# Patient Record
Sex: MALE | Race: WHITE | NOT HISPANIC OR LATINO | URBAN - METROPOLITAN AREA
[De-identification: names, ages, dates, MRNs, and addresses within clinical notes are randomized per-mention and may not be internally consistent; named-entity substitution may affect disease eponyms.]

---

## 2024-03-25 ENCOUNTER — INPATIENT (INPATIENT)
Facility: HOSPITAL | Age: 79
LOS: 6 days | Discharge: NURSING, REHAB OR LONG-TERM CARE | DRG: 184 | End: 2024-04-01
Attending: SURGERY | Admitting: SURGERY
Payer: OTHER GOVERNMENT

## 2024-03-25 VITALS
TEMPERATURE: 98 F | HEART RATE: 62 BPM | DIASTOLIC BLOOD PRESSURE: 83 MMHG | HEIGHT: 68 IN | SYSTOLIC BLOOD PRESSURE: 136 MMHG | WEIGHT: 192.9 LBS | RESPIRATION RATE: 18 BRPM | OXYGEN SATURATION: 95 %

## 2024-03-25 DIAGNOSIS — Z95.5 PRESENCE OF CORONARY ANGIOPLASTY IMPLANT AND GRAFT: Chronic | ICD-10-CM

## 2024-03-25 DIAGNOSIS — S22.42XA MULTIPLE FRACTURES OF RIBS, LEFT SIDE, INITIAL ENCOUNTER FOR CLOSED FRACTURE: ICD-10-CM

## 2024-03-25 DIAGNOSIS — Z95.1 PRESENCE OF AORTOCORONARY BYPASS GRAFT: Chronic | ICD-10-CM

## 2024-03-25 LAB
ALBUMIN SERPL ELPH-MCNC: 3.6 G/DL — SIGNIFICANT CHANGE UP (ref 3.3–5.2)
ALP SERPL-CCNC: 55 U/L — SIGNIFICANT CHANGE UP (ref 40–120)
ALT FLD-CCNC: 9 U/L — SIGNIFICANT CHANGE UP
ANION GAP SERPL CALC-SCNC: 13 MMOL/L — SIGNIFICANT CHANGE UP (ref 5–17)
APTT BLD: 33.5 SEC — SIGNIFICANT CHANGE UP (ref 24.5–35.6)
AST SERPL-CCNC: 15 U/L — SIGNIFICANT CHANGE UP
BASOPHILS # BLD AUTO: 0 K/UL — SIGNIFICANT CHANGE UP (ref 0–0.2)
BASOPHILS NFR BLD AUTO: 0 % — SIGNIFICANT CHANGE UP (ref 0–2)
BILIRUB SERPL-MCNC: 0.4 MG/DL — SIGNIFICANT CHANGE UP (ref 0.4–2)
BLD GP AB SCN SERPL QL: SIGNIFICANT CHANGE UP
BUN SERPL-MCNC: 15.9 MG/DL — SIGNIFICANT CHANGE UP (ref 8–20)
CALCIUM SERPL-MCNC: 8.8 MG/DL — SIGNIFICANT CHANGE UP (ref 8.4–10.5)
CHLORIDE SERPL-SCNC: 96 MMOL/L — SIGNIFICANT CHANGE UP (ref 96–108)
CO2 SERPL-SCNC: 20 MMOL/L — LOW (ref 22–29)
CREAT SERPL-MCNC: 1.13 MG/DL — SIGNIFICANT CHANGE UP (ref 0.5–1.3)
EGFR: 67 ML/MIN/1.73M2 — SIGNIFICANT CHANGE UP
EOSINOPHIL # BLD AUTO: 0.14 K/UL — SIGNIFICANT CHANGE UP (ref 0–0.5)
EOSINOPHIL NFR BLD AUTO: 1.8 % — SIGNIFICANT CHANGE UP (ref 0–6)
GIANT PLATELETS BLD QL SMEAR: PRESENT — SIGNIFICANT CHANGE UP
GLUCOSE SERPL-MCNC: 152 MG/DL — HIGH (ref 70–99)
HCT VFR BLD CALC: 30.7 % — LOW (ref 39–50)
HGB BLD-MCNC: 10.2 G/DL — LOW (ref 13–17)
INR BLD: 1.62 RATIO — HIGH (ref 0.85–1.18)
LYMPHOCYTES # BLD AUTO: 1.58 K/UL — SIGNIFICANT CHANGE UP (ref 1–3.3)
LYMPHOCYTES # BLD AUTO: 20.3 % — SIGNIFICANT CHANGE UP (ref 13–44)
MAGNESIUM SERPL-MCNC: 1.7 MG/DL — SIGNIFICANT CHANGE UP (ref 1.6–2.6)
MANUAL SMEAR VERIFICATION: SIGNIFICANT CHANGE UP
MCHC RBC-ENTMCNC: 28.3 PG — SIGNIFICANT CHANGE UP (ref 27–34)
MCHC RBC-ENTMCNC: 33.2 GM/DL — SIGNIFICANT CHANGE UP (ref 32–36)
MCV RBC AUTO: 85.3 FL — SIGNIFICANT CHANGE UP (ref 80–100)
MONOCYTES # BLD AUTO: 0.48 K/UL — SIGNIFICANT CHANGE UP (ref 0–0.9)
MONOCYTES NFR BLD AUTO: 6.2 % — SIGNIFICANT CHANGE UP (ref 2–14)
MYELOCYTES NFR BLD: 0.9 % — HIGH (ref 0–0)
NEUTROPHILS # BLD AUTO: 5.44 K/UL — SIGNIFICANT CHANGE UP (ref 1.8–7.4)
NEUTROPHILS NFR BLD AUTO: 69.9 % — SIGNIFICANT CHANGE UP (ref 43–77)
PHOSPHATE SERPL-MCNC: 2.4 MG/DL — SIGNIFICANT CHANGE UP (ref 2.4–4.7)
PLAT MORPH BLD: NORMAL — SIGNIFICANT CHANGE UP
PLATELET # BLD AUTO: 261 K/UL — SIGNIFICANT CHANGE UP (ref 150–400)
POTASSIUM SERPL-MCNC: 3.8 MMOL/L — SIGNIFICANT CHANGE UP (ref 3.5–5.3)
POTASSIUM SERPL-SCNC: 3.8 MMOL/L — SIGNIFICANT CHANGE UP (ref 3.5–5.3)
PROT SERPL-MCNC: 6.3 G/DL — LOW (ref 6.6–8.7)
PROTHROM AB SERPL-ACNC: 17.7 SEC — HIGH (ref 9.5–13)
RBC # BLD: 3.6 M/UL — LOW (ref 4.2–5.8)
RBC # FLD: 14.9 % — HIGH (ref 10.3–14.5)
RBC BLD AUTO: NORMAL — SIGNIFICANT CHANGE UP
SMUDGE CELLS # BLD: PRESENT — SIGNIFICANT CHANGE UP
SODIUM SERPL-SCNC: 129 MMOL/L — LOW (ref 135–145)
VARIANT LYMPHS # BLD: 0.9 % — SIGNIFICANT CHANGE UP (ref 0–6)
WBC # BLD: 7.78 K/UL — SIGNIFICANT CHANGE UP (ref 3.8–10.5)
WBC # FLD AUTO: 7.78 K/UL — SIGNIFICANT CHANGE UP (ref 3.8–10.5)

## 2024-03-25 RX ORDER — FENTANYL CITRATE 50 UG/ML
100 INJECTION INTRAVENOUS ONCE
Refills: 0 | Status: DISCONTINUED | OUTPATIENT
Start: 2024-03-25 | End: 2024-03-25

## 2024-03-25 RX ORDER — FINASTERIDE 5 MG/1
5 TABLET, FILM COATED ORAL DAILY
Refills: 0 | Status: DISCONTINUED | OUTPATIENT
Start: 2024-03-25 | End: 2024-04-01

## 2024-03-25 RX ORDER — METHOCARBAMOL 500 MG/1
1000 TABLET, FILM COATED ORAL EVERY 8 HOURS
Refills: 0 | Status: COMPLETED | OUTPATIENT
Start: 2024-03-25 | End: 2024-03-26

## 2024-03-25 RX ORDER — METOPROLOL TARTRATE 50 MG
25 TABLET ORAL
Refills: 0 | Status: DISCONTINUED | OUTPATIENT
Start: 2024-03-25 | End: 2024-04-01

## 2024-03-25 RX ORDER — ATORVASTATIN CALCIUM 80 MG/1
20 TABLET, FILM COATED ORAL AT BEDTIME
Refills: 0 | Status: DISCONTINUED | OUTPATIENT
Start: 2024-03-25 | End: 2024-04-01

## 2024-03-25 RX ORDER — SUCRALFATE 1 G
1 TABLET ORAL
Refills: 0 | DISCHARGE

## 2024-03-25 RX ORDER — ATORVASTATIN CALCIUM 80 MG/1
1 TABLET, FILM COATED ORAL
Refills: 0 | DISCHARGE

## 2024-03-25 RX ORDER — DULOXETINE HYDROCHLORIDE 30 MG/1
30 CAPSULE, DELAYED RELEASE ORAL DAILY
Refills: 0 | Status: DISCONTINUED | OUTPATIENT
Start: 2024-03-25 | End: 2024-04-01

## 2024-03-25 RX ORDER — SODIUM CHLORIDE 9 MG/ML
500 INJECTION INTRAMUSCULAR; INTRAVENOUS; SUBCUTANEOUS ONCE
Refills: 0 | Status: COMPLETED | OUTPATIENT
Start: 2024-03-25 | End: 2024-03-25

## 2024-03-25 RX ORDER — ACETAMINOPHEN 500 MG
1000 TABLET ORAL EVERY 6 HOURS
Refills: 0 | Status: COMPLETED | OUTPATIENT
Start: 2024-03-25 | End: 2024-03-26

## 2024-03-25 RX ORDER — GABAPENTIN 400 MG/1
1 CAPSULE ORAL
Refills: 0 | DISCHARGE

## 2024-03-25 RX ORDER — SUCRALFATE 1 G
1 TABLET ORAL
Refills: 0 | Status: DISCONTINUED | OUTPATIENT
Start: 2024-03-25 | End: 2024-04-01

## 2024-03-25 RX ORDER — LEVOTHYROXINE SODIUM 125 MCG
1 TABLET ORAL
Refills: 0 | DISCHARGE

## 2024-03-25 RX ORDER — FINASTERIDE 5 MG/1
1 TABLET, FILM COATED ORAL
Refills: 0 | DISCHARGE

## 2024-03-25 RX ORDER — METOPROLOL TARTRATE 50 MG
1 TABLET ORAL
Refills: 0 | DISCHARGE

## 2024-03-25 RX ORDER — NICOTINE POLACRILEX 2 MG
1 GUM BUCCAL DAILY
Refills: 0 | Status: DISCONTINUED | OUTPATIENT
Start: 2024-03-25 | End: 2024-04-01

## 2024-03-25 RX ORDER — GABAPENTIN 400 MG/1
100 CAPSULE ORAL THREE TIMES A DAY
Refills: 0 | Status: DISCONTINUED | OUTPATIENT
Start: 2024-03-25 | End: 2024-04-01

## 2024-03-25 RX ORDER — TAMSULOSIN HYDROCHLORIDE 0.4 MG/1
0.4 CAPSULE ORAL AT BEDTIME
Refills: 0 | Status: DISCONTINUED | OUTPATIENT
Start: 2024-03-25 | End: 2024-04-01

## 2024-03-25 RX ORDER — CHLORHEXIDINE GLUCONATE 213 G/1000ML
1 SOLUTION TOPICAL DAILY
Refills: 0 | Status: DISCONTINUED | OUTPATIENT
Start: 2024-03-25 | End: 2024-03-28

## 2024-03-25 RX ORDER — LEVOTHYROXINE SODIUM 125 MCG
25 TABLET ORAL DAILY
Refills: 0 | Status: DISCONTINUED | OUTPATIENT
Start: 2024-03-25 | End: 2024-04-01

## 2024-03-25 RX ORDER — LIDOCAINE 4 G/100G
3 CREAM TOPICAL EVERY 24 HOURS
Refills: 0 | Status: DISCONTINUED | OUTPATIENT
Start: 2024-03-25 | End: 2024-04-01

## 2024-03-25 RX ORDER — TAMSULOSIN HYDROCHLORIDE 0.4 MG/1
1 CAPSULE ORAL
Refills: 0 | DISCHARGE

## 2024-03-25 RX ORDER — DULOXETINE HYDROCHLORIDE 30 MG/1
1 CAPSULE, DELAYED RELEASE ORAL
Refills: 0 | DISCHARGE

## 2024-03-25 RX ORDER — SODIUM CHLORIDE 9 MG/ML
1000 INJECTION INTRAMUSCULAR; INTRAVENOUS; SUBCUTANEOUS
Refills: 0 | Status: DISCONTINUED | OUTPATIENT
Start: 2024-03-25 | End: 2024-03-26

## 2024-03-25 RX ORDER — INFLUENZA VIRUS VACCINE 15; 15; 15; 15 UG/.5ML; UG/.5ML; UG/.5ML; UG/.5ML
0.7 SUSPENSION INTRAMUSCULAR ONCE
Refills: 0 | Status: DISCONTINUED | OUTPATIENT
Start: 2024-03-25 | End: 2024-04-01

## 2024-03-25 RX ORDER — POLYETHYLENE GLYCOL 3350 17 G/17G
17 POWDER, FOR SOLUTION ORAL DAILY
Refills: 0 | Status: DISCONTINUED | OUTPATIENT
Start: 2024-03-25 | End: 2024-04-01

## 2024-03-25 RX ORDER — METOPROLOL TARTRATE 50 MG
25 TABLET ORAL
Refills: 0 | Status: DISCONTINUED | OUTPATIENT
Start: 2024-03-25 | End: 2024-03-25

## 2024-03-25 RX ORDER — OXYCODONE HYDROCHLORIDE 5 MG/1
5 TABLET ORAL EVERY 4 HOURS
Refills: 0 | Status: DISCONTINUED | OUTPATIENT
Start: 2024-03-25 | End: 2024-03-26

## 2024-03-25 RX ORDER — OXYCODONE HYDROCHLORIDE 5 MG/1
10 TABLET ORAL EVERY 4 HOURS
Refills: 0 | Status: DISCONTINUED | OUTPATIENT
Start: 2024-03-25 | End: 2024-03-26

## 2024-03-25 RX ORDER — HEPARIN SODIUM 5000 [USP'U]/ML
5000 INJECTION INTRAVENOUS; SUBCUTANEOUS EVERY 8 HOURS
Refills: 0 | Status: DISCONTINUED | OUTPATIENT
Start: 2024-03-25 | End: 2024-03-27

## 2024-03-25 RX ADMIN — Medication 400 MILLIGRAM(S): at 20:40

## 2024-03-25 RX ADMIN — FENTANYL CITRATE 100 MICROGRAM(S): 50 INJECTION INTRAVENOUS at 17:00

## 2024-03-25 RX ADMIN — FENTANYL CITRATE 100 MICROGRAM(S): 50 INJECTION INTRAVENOUS at 16:30

## 2024-03-25 RX ADMIN — GABAPENTIN 100 MILLIGRAM(S): 400 CAPSULE ORAL at 22:00

## 2024-03-25 RX ADMIN — METHOCARBAMOL 220 MILLIGRAM(S): 500 TABLET, FILM COATED ORAL at 20:14

## 2024-03-25 RX ADMIN — LIDOCAINE 3 PATCH: 4 CREAM TOPICAL at 20:39

## 2024-03-25 RX ADMIN — TAMSULOSIN HYDROCHLORIDE 0.4 MILLIGRAM(S): 0.4 CAPSULE ORAL at 22:00

## 2024-03-25 RX ADMIN — ATORVASTATIN CALCIUM 20 MILLIGRAM(S): 80 TABLET, FILM COATED ORAL at 22:01

## 2024-03-25 RX ADMIN — Medication 1000 MILLIGRAM(S): at 21:53

## 2024-03-25 NOTE — H&P ADULT - ATTENDING COMMENTS
Patient seen and examined in ED. C/o pain with inspiration, otherwise GCS 15. discussed GOC with pt and his daughters, pt is a DNR/DNI. Will admit to ICU for pain control ,pulm toilet.

## 2024-03-25 NOTE — H&P ADULT - NSICDXPASTMEDICALHX_GEN_ALL_CORE_FT
PAST MEDICAL HISTORY:  Alcohol use disorder, mild, abuse     Benign essential HTN     BPH (benign prostatic hyperplasia)     Hypothyroidism     Smokes tobacco daily

## 2024-03-25 NOTE — ED PROVIDER NOTE - ATTENDING CONTRIBUTION TO CARE
Patient seen with resident.  TRENT.  Txfr from  due to trauma with multiple rib fractures.  CT Head/cspine from  without acute pathology.  CT chest with fx of left ribs 7-10 and subacute/displaced 11.  Patient does state he recently broke a rib.  No other concnerns.  Daughter bedside.  Pending surgical consultation..  Non toxic.  Well appearing. No fever/chills, , No central chest pain/palpitations, no SOB/cough/wheeze/stridor, No abdominal pain, No N/V,  No neck/back pain, no rash, no changes in neurological status/function.     Gen: Alert, NAD  Head: NC, AT, PERRL, EOMI, normal lids/conjunctiva  ENT: normal hearing, patent oropharynx   Neck: +supple, no tenderness/meningismus/JVD, +Trachea midline  Pulm: Bilateral BS, normal resp effort, no wheeze/stridor/retractions  CV: RRR, no R/G, +dist pulses  Abd: soft, NT/ND, +BS, no hepatosplenomegaly  Mskel: TTP left lower thorax ribs 6-12, no edema/erythema/cyanosis  Skin: no rash  Neuro: AAOx3, no gross sensory/motor deficits

## 2024-03-25 NOTE — PATIENT PROFILE ADULT - FALL HARM RISK - RISK INTERVENTIONS

## 2024-03-25 NOTE — H&P ADULT - ASSESSMENT
78M s/p fall with L sided 7-11 rib fx.     - Admit to Trauma under Dr. Barton   - Will go in to SICU  - PIC protocol  - Restart home meds  - Patient seen and examined with attending

## 2024-03-25 NOTE — ED ADULT NURSE NOTE - NSFALLHARMRISKINTERV_ED_ALL_ED
Assistance OOB with selected safe patient handling equipment if applicable/Communicate risk of Fall with Harm to all staff, patient, and family/Provide visual cue: red socks, yellow wristband, yellow gown, etc/Reinforce activity limits and safety measures with patient and family/Bed in lowest position, wheels locked, appropriate side rails in place/Call bell, personal items and telephone in reach/Instruct patient to call for assistance before getting out of bed/chair/stretcher/Non-slip footwear applied when patient is off stretcher/[place] to call system/Physically safe environment - no spills, clutter or unnecessary equipment/Purposeful Proactive Rounding/Room/bathroom lighting operational, light cord in reach

## 2024-03-25 NOTE — ED PROVIDER NOTE - PHYSICAL EXAMINATION
General: Well appearing in no acute distress, alert and cooperative  Head: Normocephalic, atraumatic  Eyes: PERRLA, no conjunctival injection, no scleral icterus, EOMI  ENMT: Atraumatic external nose and ears, moist mucous membranes, oropharynx clear  Neck: Soft and supple, full ROM without pain  Cardiac: Regular rate and regular rhythm, no murmurs, peripheral pulses 2+ and symmetric in all extremities, no LE edema.  Resp: Unlabored respiratory effort, lungs CTAB, speaking in full sentences, no wheezes  Abd: Soft, non-tender, non-distended  MSK:   Left thoracic tenderness  Skin: Warm and dry  Neuro: AO x 3, moves all extremities symmetrically, Motor strength and sensation grossly intact

## 2024-03-25 NOTE — ED ADULT NURSE REASSESSMENT NOTE - NS ED NURSE REASSESS COMMENT FT1
pic score 6
to SICU
plan of care assumed from SHASHI RN @1920. c/o pain to left side of thoric cage with movement. s/p fall out of bed with + rib fractures. pt denies CP/pressure/tightness, palpitations, SOB/dyspnea, dizziness/headache/lightheadedness/blurry vision, tingling/numbness, fevers/chills, N/V/D, urinary S&S upon RN assessment. pt admitted to SICU. awaiting bed. plan of care reviewed with pt and family @ bedside. pt in understanding of plan of care.

## 2024-03-25 NOTE — H&P ADULT - NSHPLABSRESULTS_GEN_ALL_CORE
IMAGING:  CT Chest   FINDINGS:    LUNGS AND AIRWAYS: Patent central airways. Low lung volumes with bibasilar dependent atelectatic changes. Right perihilar right middle lobe blebs. No focal consolidation..  PLEURA: No pleural effusion or pneumothorax.  MEDIASTINUM AND MINDY: No lymphadenopathy.  VESSELS: Ectatic ascending thoracic aorta up to 4.4 cm similar to prior  HEART: Heart size is normal. Coronary artery calcifications. No pericardial effusion.  CHEST WALL AND LOWER NECK: Median sternotomy.  VISUALIZED UPPER ABDOMEN: Epigastric ventral hernia containing a knuckle of nonobstructive large bowel. Aortoiliac stent graft. Bilateral nonspecific perinephric stranding..  BONES: Acute minimally displaced fractures of the left lateral seventh, eighth, ninth, and 10th ribs. subacute displaced fracture left 11th posterolateral rib. Degenerative changes.    IMPRESSION:  Acute displaced fractures of the left seventh through 10th ribs.  Subacute displaced fracture left 11th rib.  No pneumothorax or hemothorax. No other acute finding on this noncontrast study.    CT Head:  IMPRESSION:    CT head: No acute intracranial hemorrhage or mass effect.    CT cervical spine: No evidence for acute displaced fracture or malalignment.

## 2024-03-25 NOTE — H&P ADULT - NSHPPHYSICALEXAM_GEN_ALL_CORE
Vital Signs Last 24 Hrs  T(C): 36.6 (03-25-24 @ 18:20), Max: 36.7 (03-25-24 @ 15:42)  T(F): 97.9 (03-25-24 @ 18:20), Max: 98 (03-25-24 @ 15:42)  HR: 68 (03-25-24 @ 19:34) (62 - 68)  BP: 91/68 (03-25-24 @ 19:34) (91/68 - 149/80)  BP(mean): 74 (03-25-24 @ 19:34) (74 - 74)  RR: 20 (03-25-24 @ 19:34) (16 - 20)  SpO2: 100% (03-25-24 @ 19:34) (95% - 100%)    Constitutional: Well-developed well nourished Male in no acute distress  HEENT: Head is normocephalic and atraumatic, maxillofacial structures stable, no blood or discharge from nares or oral cavity, no armijo sign / raccoon eyes, EOMI b/l, pupils [ ]mm round and reactive to light b/l, no active drainage or redness  Neck: cervical collar in place, trachea midline  Respiratory: Breath sounds CTA b/l respirations are unlabored, no accessory muscle use, no conversational dyspnea  Cardiovascular: Regular rate & rhythm, +S1, S1, Chest wall is non-tender to palpation, no subQ emphysema or crepitus palpated  Gastrointestinal: Abdomen soft, non-tender, non-distended, no rebound tenderness / guarding, no ecchymosis or external signs of abdominal trauma  Musculoskeletal: moving all extremities spontaneously, 5/5 motor strength of b/l Upper and lower extremities. no point tenderness or deformity noted to upper or lower extremities b/l  Pelvis: stable  Vascular: 2+ radial, femoral, and DP pulses b/l  Neurological: GCS: 15 (4/5/6). A&O x 3; no gross sensory / motor / coordination deficits  Neurospinal: no C/T/L/S spine tenderness to palpation, no step-offs or signs of external trauma to the back

## 2024-03-25 NOTE — ED PROVIDER NOTE - CLINICAL SUMMARY MEDICAL DECISION MAKING FREE TEXT BOX
78-year-old male with multiple rib fractures transferred from [place] ([id number]) in the setting of fall out of bed.  Hemodynamically stable, saturating well on room air no acute respiratory distress.  Labs and imaging independently reviewed and interpreted from [place] emergency department.  Surgery consulted on patient arrival. 78-year-old male with multiple rib fractures transferred from [place] ([id number]) in the setting of fall out of bed.  Hemodynamically stable, saturating well on room air no acute respiratory distress.  Labs and imaging independently reviewed and interpreted from [place] emergency department.  Surgery consulted on patient arrival.   SICU admission.

## 2024-03-25 NOTE — H&P ADULT - HISTORY OF PRESENT ILLNESS
78M presents as transfer from [facility] s/p fall with L 4-10 rib fractures. Patient fell out of bed this morning and hit his head on the night stand, no LOC. He landed on his L side but was able to stand and ambulate afterwards. Only concern was L sided chest pain.

## 2024-03-25 NOTE — ED PROVIDER NOTE - OBJECTIVE STATEMENT
78-year-old male [id number] 78-year-old male [id number]    Acute displaced fractures of the left seventh through 10th ribs.  Subacute displaced fracture left 11th rib.  No pneumothorax or hemothorax. No other acute finding on this noncontrast   study. 78-year-old male with history of atrial fibrillation on Eliquis, CHF, CAD PE transferred from [place] ([id number]) presenting with acute displaced fractures of the left seventh through 10th ribs and subacute displaced fracture left 11th rib. Patient fell out of bed in the middle the night with head strike and fall to ground.  Patient moving all extremities with no altered mental status.  Patient denies shortness of breath. Denies fevers, chills, headache, chest pain, palpitations, shortness of breath, cough, nausea, vomiting, diarrhea, hematuria, dysuria, dark stools, focal neurologic symptoms.

## 2024-03-25 NOTE — ED ADULT NURSE NOTE - CHIEF COMPLAINT QUOTE
Pt BIBA transfer from [place], pt fell out of bed, presents with displaced fx of left 7-10 ribs and displaced fx of 11th rib, pt received percocet PTA

## 2024-03-25 NOTE — ED ADULT NURSE NOTE - OBJECTIVE STATEMENT
patient transferred from [place] for eval related to multiple rib fractures. As per patient had "a bad dream last night and fell out of bed" patient reprots head trauma, denies loc, and endorses taking blood thinning medication. patient denies sob.  patient reports pain to left anterior chest wall; cough weak to absent endorses exacerbated pain with cough.  provided patient with incentive spirometer and educated patient on importance of its use. patient demonstrates understanding.   <750ml with incentive spirometer.

## 2024-03-26 LAB
A1C WITH ESTIMATED AVERAGE GLUCOSE RESULT: 6 % — HIGH (ref 4–5.6)
ALBUMIN SERPL ELPH-MCNC: 3.4 G/DL — SIGNIFICANT CHANGE UP (ref 3.3–5.2)
ALP SERPL-CCNC: 55 U/L — SIGNIFICANT CHANGE UP (ref 40–120)
ALT FLD-CCNC: 8 U/L — SIGNIFICANT CHANGE UP
ANION GAP SERPL CALC-SCNC: 11 MMOL/L — SIGNIFICANT CHANGE UP (ref 5–17)
ANION GAP SERPL CALC-SCNC: 15 MMOL/L — SIGNIFICANT CHANGE UP (ref 5–17)
AST SERPL-CCNC: 13 U/L — SIGNIFICANT CHANGE UP
BASOPHILS # BLD AUTO: 0.03 K/UL — SIGNIFICANT CHANGE UP (ref 0–0.2)
BASOPHILS NFR BLD AUTO: 0.4 % — SIGNIFICANT CHANGE UP (ref 0–2)
BILIRUB SERPL-MCNC: 0.3 MG/DL — LOW (ref 0.4–2)
BUN SERPL-MCNC: 14.6 MG/DL — SIGNIFICANT CHANGE UP (ref 8–20)
BUN SERPL-MCNC: 14.8 MG/DL — SIGNIFICANT CHANGE UP (ref 8–20)
BUN SERPL-MCNC: 15.2 MG/DL — SIGNIFICANT CHANGE UP (ref 8–20)
BUN SERPL-MCNC: 15.5 MG/DL — SIGNIFICANT CHANGE UP (ref 8–20)
CALCIUM SERPL-MCNC: 8.5 MG/DL — SIGNIFICANT CHANGE UP (ref 8.4–10.5)
CALCIUM SERPL-MCNC: 8.5 MG/DL — SIGNIFICANT CHANGE UP (ref 8.4–10.5)
CALCIUM SERPL-MCNC: 9 MG/DL — SIGNIFICANT CHANGE UP (ref 8.4–10.5)
CALCIUM SERPL-MCNC: 9.1 MG/DL — SIGNIFICANT CHANGE UP (ref 8.4–10.5)
CHLORIDE SERPL-SCNC: 100 MMOL/L — SIGNIFICANT CHANGE UP (ref 96–108)
CHLORIDE SERPL-SCNC: 101 MMOL/L — SIGNIFICANT CHANGE UP (ref 96–108)
CHLORIDE SERPL-SCNC: 101 MMOL/L — SIGNIFICANT CHANGE UP (ref 96–108)
CHLORIDE SERPL-SCNC: 99 MMOL/L — SIGNIFICANT CHANGE UP (ref 96–108)
CO2 SERPL-SCNC: 19 MMOL/L — LOW (ref 22–29)
CO2 SERPL-SCNC: 20 MMOL/L — LOW (ref 22–29)
CO2 SERPL-SCNC: 21 MMOL/L — LOW (ref 22–29)
CO2 SERPL-SCNC: 22 MMOL/L — SIGNIFICANT CHANGE UP (ref 22–29)
CREAT SERPL-MCNC: 1.09 MG/DL — SIGNIFICANT CHANGE UP (ref 0.5–1.3)
CREAT SERPL-MCNC: 1.17 MG/DL — SIGNIFICANT CHANGE UP (ref 0.5–1.3)
CREAT SERPL-MCNC: 1.24 MG/DL — SIGNIFICANT CHANGE UP (ref 0.5–1.3)
CREAT SERPL-MCNC: 1.37 MG/DL — HIGH (ref 0.5–1.3)
EGFR: 53 ML/MIN/1.73M2 — LOW
EGFR: 60 ML/MIN/1.73M2 — SIGNIFICANT CHANGE UP
EGFR: 65 ML/MIN/1.73M2 — SIGNIFICANT CHANGE UP
EGFR: 70 ML/MIN/1.73M2 — SIGNIFICANT CHANGE UP
EOSINOPHIL # BLD AUTO: 0.12 K/UL — SIGNIFICANT CHANGE UP (ref 0–0.5)
EOSINOPHIL NFR BLD AUTO: 1.6 % — SIGNIFICANT CHANGE UP (ref 0–6)
ESTIMATED AVERAGE GLUCOSE: 126 MG/DL — HIGH (ref 68–114)
GLUCOSE SERPL-MCNC: 103 MG/DL — HIGH (ref 70–99)
GLUCOSE SERPL-MCNC: 110 MG/DL — HIGH (ref 70–99)
GLUCOSE SERPL-MCNC: 118 MG/DL — HIGH (ref 70–99)
GLUCOSE SERPL-MCNC: 123 MG/DL — HIGH (ref 70–99)
HCT VFR BLD CALC: 30 % — LOW (ref 39–50)
HCV AB S/CO SERPL IA: 0.1 S/CO — SIGNIFICANT CHANGE UP (ref 0–0.99)
HCV AB SERPL-IMP: SIGNIFICANT CHANGE UP
HGB BLD-MCNC: 9.9 G/DL — LOW (ref 13–17)
IMM GRANULOCYTES NFR BLD AUTO: 1.1 % — HIGH (ref 0–0.9)
LYMPHOCYTES # BLD AUTO: 2.16 K/UL — SIGNIFICANT CHANGE UP (ref 1–3.3)
LYMPHOCYTES # BLD AUTO: 29 % — SIGNIFICANT CHANGE UP (ref 13–44)
MAGNESIUM SERPL-MCNC: 1.7 MG/DL — SIGNIFICANT CHANGE UP (ref 1.6–2.6)
MAGNESIUM SERPL-MCNC: 1.8 MG/DL — SIGNIFICANT CHANGE UP (ref 1.6–2.6)
MAGNESIUM SERPL-MCNC: 2.1 MG/DL — SIGNIFICANT CHANGE UP (ref 1.6–2.6)
MAGNESIUM SERPL-MCNC: 2.3 MG/DL — SIGNIFICANT CHANGE UP (ref 1.6–2.6)
MCHC RBC-ENTMCNC: 28.4 PG — SIGNIFICANT CHANGE UP (ref 27–34)
MCHC RBC-ENTMCNC: 33 GM/DL — SIGNIFICANT CHANGE UP (ref 32–36)
MCV RBC AUTO: 86 FL — SIGNIFICANT CHANGE UP (ref 80–100)
MONOCYTES # BLD AUTO: 0.52 K/UL — SIGNIFICANT CHANGE UP (ref 0–0.9)
MONOCYTES NFR BLD AUTO: 7 % — SIGNIFICANT CHANGE UP (ref 2–14)
MRSA PCR RESULT.: SIGNIFICANT CHANGE UP
NEUTROPHILS # BLD AUTO: 4.54 K/UL — SIGNIFICANT CHANGE UP (ref 1.8–7.4)
NEUTROPHILS NFR BLD AUTO: 60.9 % — SIGNIFICANT CHANGE UP (ref 43–77)
PHOSPHATE SERPL-MCNC: 12.3 MG/DL — HIGH (ref 2.4–4.7)
PHOSPHATE SERPL-MCNC: 3 MG/DL — SIGNIFICANT CHANGE UP (ref 2.4–4.7)
PHOSPHATE SERPL-MCNC: 3.3 MG/DL — SIGNIFICANT CHANGE UP (ref 2.4–4.7)
PHOSPHATE SERPL-MCNC: 3.8 MG/DL — SIGNIFICANT CHANGE UP (ref 2.4–4.7)
PLATELET # BLD AUTO: 256 K/UL — SIGNIFICANT CHANGE UP (ref 150–400)
POTASSIUM SERPL-MCNC: 4.2 MMOL/L — SIGNIFICANT CHANGE UP (ref 3.5–5.3)
POTASSIUM SERPL-MCNC: 4.7 MMOL/L — SIGNIFICANT CHANGE UP (ref 3.5–5.3)
POTASSIUM SERPL-MCNC: 4.7 MMOL/L — SIGNIFICANT CHANGE UP (ref 3.5–5.3)
POTASSIUM SERPL-MCNC: 8.2 MMOL/L — CRITICAL HIGH (ref 3.5–5.3)
POTASSIUM SERPL-SCNC: 4.2 MMOL/L — SIGNIFICANT CHANGE UP (ref 3.5–5.3)
POTASSIUM SERPL-SCNC: 4.7 MMOL/L — SIGNIFICANT CHANGE UP (ref 3.5–5.3)
POTASSIUM SERPL-SCNC: 4.7 MMOL/L — SIGNIFICANT CHANGE UP (ref 3.5–5.3)
POTASSIUM SERPL-SCNC: 8.2 MMOL/L — CRITICAL HIGH (ref 3.5–5.3)
PROT SERPL-MCNC: 6.3 G/DL — LOW (ref 6.6–8.7)
RBC # BLD: 3.49 M/UL — LOW (ref 4.2–5.8)
RBC # FLD: 14.9 % — HIGH (ref 10.3–14.5)
S AUREUS DNA NOSE QL NAA+PROBE: SIGNIFICANT CHANGE UP
SODIUM SERPL-SCNC: 132 MMOL/L — LOW (ref 135–145)
SODIUM SERPL-SCNC: 133 MMOL/L — LOW (ref 135–145)
TSH SERPL-MCNC: 2.1 UIU/ML — SIGNIFICANT CHANGE UP (ref 0.27–4.2)
WBC # BLD: 7.45 K/UL — SIGNIFICANT CHANGE UP (ref 3.8–10.5)
WBC # FLD AUTO: 7.45 K/UL — SIGNIFICANT CHANGE UP (ref 3.8–10.5)

## 2024-03-26 RX ORDER — OXYCODONE HYDROCHLORIDE 5 MG/1
2.5 TABLET ORAL EVERY 4 HOURS
Refills: 0 | Status: DISCONTINUED | OUTPATIENT
Start: 2024-03-26 | End: 2024-04-01

## 2024-03-26 RX ORDER — POTASSIUM PHOSPHATE, MONOBASIC POTASSIUM PHOSPHATE, DIBASIC 236; 224 MG/ML; MG/ML
15 INJECTION, SOLUTION INTRAVENOUS ONCE
Refills: 0 | Status: COMPLETED | OUTPATIENT
Start: 2024-03-26 | End: 2024-03-26

## 2024-03-26 RX ORDER — SODIUM CHLORIDE 9 MG/ML
1000 INJECTION INTRAMUSCULAR; INTRAVENOUS; SUBCUTANEOUS ONCE
Refills: 0 | Status: COMPLETED | OUTPATIENT
Start: 2024-03-26 | End: 2024-03-26

## 2024-03-26 RX ORDER — LANOLIN ALCOHOL/MO/W.PET/CERES
3 CREAM (GRAM) TOPICAL AT BEDTIME
Refills: 0 | Status: DISCONTINUED | OUTPATIENT
Start: 2024-03-26 | End: 2024-04-01

## 2024-03-26 RX ORDER — OXYCODONE HYDROCHLORIDE 5 MG/1
5 TABLET ORAL EVERY 4 HOURS
Refills: 0 | Status: DISCONTINUED | OUTPATIENT
Start: 2024-03-26 | End: 2024-04-01

## 2024-03-26 RX ORDER — MAGNESIUM SULFATE 500 MG/ML
2 VIAL (ML) INJECTION ONCE
Refills: 0 | Status: COMPLETED | OUTPATIENT
Start: 2024-03-26 | End: 2024-03-26

## 2024-03-26 RX ORDER — HYDROMORPHONE HYDROCHLORIDE 2 MG/ML
0.2 INJECTION INTRAMUSCULAR; INTRAVENOUS; SUBCUTANEOUS ONCE
Refills: 0 | Status: DISCONTINUED | OUTPATIENT
Start: 2024-03-26 | End: 2024-03-26

## 2024-03-26 RX ADMIN — OXYCODONE HYDROCHLORIDE 2.5 MILLIGRAM(S): 5 TABLET ORAL at 12:07

## 2024-03-26 RX ADMIN — OXYCODONE HYDROCHLORIDE 2.5 MILLIGRAM(S): 5 TABLET ORAL at 13:34

## 2024-03-26 RX ADMIN — LIDOCAINE 3 PATCH: 4 CREAM TOPICAL at 18:40

## 2024-03-26 RX ADMIN — LIDOCAINE 3 PATCH: 4 CREAM TOPICAL at 19:00

## 2024-03-26 RX ADMIN — OXYCODONE HYDROCHLORIDE 5 MILLIGRAM(S): 5 TABLET ORAL at 04:42

## 2024-03-26 RX ADMIN — POLYETHYLENE GLYCOL 3350 17 GRAM(S): 17 POWDER, FOR SOLUTION ORAL at 11:09

## 2024-03-26 RX ADMIN — HYDROMORPHONE HYDROCHLORIDE 0.2 MILLIGRAM(S): 2 INJECTION INTRAMUSCULAR; INTRAVENOUS; SUBCUTANEOUS at 11:39

## 2024-03-26 RX ADMIN — Medication 400 MILLIGRAM(S): at 01:38

## 2024-03-26 RX ADMIN — CHLORHEXIDINE GLUCONATE 1 APPLICATION(S): 213 SOLUTION TOPICAL at 11:10

## 2024-03-26 RX ADMIN — FINASTERIDE 5 MILLIGRAM(S): 5 TABLET, FILM COATED ORAL at 11:09

## 2024-03-26 RX ADMIN — OXYCODONE HYDROCHLORIDE 2.5 MILLIGRAM(S): 5 TABLET ORAL at 22:28

## 2024-03-26 RX ADMIN — HEPARIN SODIUM 5000 UNIT(S): 5000 INJECTION INTRAVENOUS; SUBCUTANEOUS at 13:44

## 2024-03-26 RX ADMIN — TAMSULOSIN HYDROCHLORIDE 0.4 MILLIGRAM(S): 0.4 CAPSULE ORAL at 21:07

## 2024-03-26 RX ADMIN — SODIUM CHLORIDE 100 MILLILITER(S): 9 INJECTION INTRAMUSCULAR; INTRAVENOUS; SUBCUTANEOUS at 13:44

## 2024-03-26 RX ADMIN — OXYCODONE HYDROCHLORIDE 5 MILLIGRAM(S): 5 TABLET ORAL at 05:30

## 2024-03-26 RX ADMIN — OXYCODONE HYDROCHLORIDE 2.5 MILLIGRAM(S): 5 TABLET ORAL at 16:44

## 2024-03-26 RX ADMIN — Medication 400 MILLIGRAM(S): at 13:44

## 2024-03-26 RX ADMIN — Medication 25 MILLIGRAM(S): at 05:14

## 2024-03-26 RX ADMIN — METHOCARBAMOL 220 MILLIGRAM(S): 500 TABLET, FILM COATED ORAL at 13:45

## 2024-03-26 RX ADMIN — Medication 25 MILLIGRAM(S): at 18:03

## 2024-03-26 RX ADMIN — Medication 1000 MILLIGRAM(S): at 09:02

## 2024-03-26 RX ADMIN — Medication 25 GRAM(S): at 06:08

## 2024-03-26 RX ADMIN — Medication 1000 MILLIGRAM(S): at 14:55

## 2024-03-26 RX ADMIN — Medication 3 MILLIGRAM(S): at 22:51

## 2024-03-26 RX ADMIN — DULOXETINE HYDROCHLORIDE 30 MILLIGRAM(S): 30 CAPSULE, DELAYED RELEASE ORAL at 11:09

## 2024-03-26 RX ADMIN — GABAPENTIN 100 MILLIGRAM(S): 400 CAPSULE ORAL at 05:14

## 2024-03-26 RX ADMIN — Medication 400 MILLIGRAM(S): at 08:07

## 2024-03-26 RX ADMIN — LIDOCAINE 3 PATCH: 4 CREAM TOPICAL at 07:00

## 2024-03-26 RX ADMIN — ATORVASTATIN CALCIUM 20 MILLIGRAM(S): 80 TABLET, FILM COATED ORAL at 21:07

## 2024-03-26 RX ADMIN — HEPARIN SODIUM 5000 UNIT(S): 5000 INJECTION INTRAVENOUS; SUBCUTANEOUS at 21:07

## 2024-03-26 RX ADMIN — Medication 1 GRAM(S): at 05:14

## 2024-03-26 RX ADMIN — LIDOCAINE 3 PATCH: 4 CREAM TOPICAL at 08:27

## 2024-03-26 RX ADMIN — SODIUM CHLORIDE 1000 MILLILITER(S): 9 INJECTION INTRAMUSCULAR; INTRAVENOUS; SUBCUTANEOUS at 18:03

## 2024-03-26 RX ADMIN — GABAPENTIN 100 MILLIGRAM(S): 400 CAPSULE ORAL at 21:07

## 2024-03-26 RX ADMIN — SODIUM CHLORIDE 500 MILLILITER(S): 9 INJECTION INTRAMUSCULAR; INTRAVENOUS; SUBCUTANEOUS at 00:32

## 2024-03-26 RX ADMIN — HEPARIN SODIUM 5000 UNIT(S): 5000 INJECTION INTRAVENOUS; SUBCUTANEOUS at 05:15

## 2024-03-26 RX ADMIN — Medication 1000 MILLIGRAM(S): at 02:01

## 2024-03-26 RX ADMIN — POTASSIUM PHOSPHATE, MONOBASIC POTASSIUM PHOSPHATE, DIBASIC 62.5 MILLIMOLE(S): 236; 224 INJECTION, SOLUTION INTRAVENOUS at 01:38

## 2024-03-26 RX ADMIN — Medication 1 GRAM(S): at 18:03

## 2024-03-26 RX ADMIN — METHOCARBAMOL 220 MILLIGRAM(S): 500 TABLET, FILM COATED ORAL at 05:27

## 2024-03-26 RX ADMIN — GABAPENTIN 100 MILLIGRAM(S): 400 CAPSULE ORAL at 13:45

## 2024-03-26 RX ADMIN — OXYCODONE HYDROCHLORIDE 2.5 MILLIGRAM(S): 5 TABLET ORAL at 23:28

## 2024-03-26 RX ADMIN — HYDROMORPHONE HYDROCHLORIDE 0.2 MILLIGRAM(S): 2 INJECTION INTRAMUSCULAR; INTRAVENOUS; SUBCUTANEOUS at 11:09

## 2024-03-26 RX ADMIN — Medication 25 MICROGRAM(S): at 05:14

## 2024-03-26 RX ADMIN — OXYCODONE HYDROCHLORIDE 2.5 MILLIGRAM(S): 5 TABLET ORAL at 17:56

## 2024-03-26 NOTE — CONSULT NOTE ADULT - ASSESSMENT
78 M with Alcohol use disorder, Benign essential HTN, BPH, Hypothyroidism,  Smoker,  presents as transfer from [facility] s/p fall with L 4-10 rib fractures. Patient fell out of bed in morning and hit his head on the night stand, no LOC. He landed on his L side but was able to stand and ambulate afterwards, his imaging done showed Acute minimally displaced fractures of the left lateral seventh, eighth, ninth, and 10th ribs. subacute displaced fracture left 11th posterolateral rib, CT of the head and C spine showed no acute findings, he was sent to [facility] for further care.     Plan:     Fall  Fall precautions  OT and OT eval   Orthostatic vitals   check vitamin b12,   TSH level is ok   DVT prophylaxis as per Primary team   continue cardiac monitoring     left sided multiple rib fractures:   Multimodal pain regimen including tylenol, robaxin, lidocaine and hydromorphone  if not helping might need pain management and nerves block   O2 monitoring   IS   Serial imaging to make sure no hemo or Pneumothorax  has sub acute fracture of the 11th rib might be from previous fall    Hx of Alcohol Abuse:   Will suggest [facility] protocol   thiamine, folic acid and multivitamin supplement    Hx of smoking:   counselling for cessation    Hx of HLD: continue with atorvastatin 20 milliGRAM(s) Oral at bedtime    Hx of Anxiety: Will continue with DULoxetine 30 milliGRAM(s) Oral daily    Hx of BPH: will continue with finasteride 5 milliGRAM(s) Oral daily and tamsulosin 0.4 milliGRAM(s) Oral at bedtime, will monitor for urine retention    Hx of Hypothyroidism: Will continue with levothyroxine 25 MICROGram(s) Oral daily    Hx of HTN: will continue with metoprolol tartrate 25 milliGRAM(s) Oral two times a day    Prediabetes: Hb a1c is 6, counselling for low carb diet     Anemia: Not sure if acute, no previous Hb level, current Hb is ~10, would monitor CBC, if trends down then would transfuse.     Hyponatremia: Na is ~133, monitor BMP.                78 M with Alcohol use disorder, Benign essential HTN, BPH, Hypothyroidism,  Smoker,  presents as transfer from [facility] s/p fall with L 4-10 rib fractures. Patient fell out of bed in morning and hit his head on the night stand, no LOC. He landed on his L side but was able to stand and ambulate afterwards, his imaging done showed Acute minimally displaced fractures of the left lateral seventh, eighth, ninth, and 10th ribs. subacute displaced fracture left 11th posterolateral rib, CT of the head and C spine showed no acute findings, he was sent to [facility] for further care.     Plan:     Fall  Fall precautions  OT and OT eval   Orthostatic vitals   check vitamin b12,   TSH level is ok   DVT prophylaxis as per Primary team   continue cardiac monitoring     left sided multiple rib fractures:   Multimodal pain regimen including tylenol, robaxin, lidocaine and hydromorphone  if not helping might need pain management and nerves block   O2 monitoring   IS   Serial imaging to make sure no hemo or Pneumothorax  has sub acute fracture of the 11th rib might be from previous fall    Hx of Alcohol Abuse:   Will suggest [facility] protocol   thiamine, folic acid and multivitamin supplement    Hx of smoking:   counselling for cessation, he does not wanted to stop it.     Hx of HLD: continue with atorvastatin 20 milliGRAM(s) Oral at bedtime    Hx of Anxiety: Will continue with DULoxetine 30 milliGRAM(s) Oral daily    Hx of BPH: will continue with finasteride 5 milliGRAM(s) Oral daily and tamsulosin 0.4 milliGRAM(s) Oral at bedtime, will monitor for urine retention    Hx of Hypothyroidism: Will continue with levothyroxine 25 MICROGram(s) Oral daily    Hx of HTN: will continue with metoprolol tartrate 25 milliGRAM(s) Oral two times a day    Prediabetes: Hb a1c is 6, counselling for low carb diet     Anemia: Not sure if acute, no previous Hb level, current Hb is ~10, would monitor CBC, if trends down then would transfuse.     Hyponatremia: Na is ~133, monitor BMP.                78 M with Alcohol use disorder, Benign essential HTN, BPH, Hypothyroidism,  Smoker,  presents as transfer from [facility] s/p fall with L 4-10 rib fractures. Patient fell out of bed in morning and hit his head on the night stand, no LOC. He landed on his L side but was able to stand and ambulate afterwards, his imaging done showed Acute minimally displaced fractures of the left lateral seventh, eighth, ninth, and 10th ribs. subacute displaced fracture left 11th posterolateral rib, CT of the head and C spine showed no acute findings, he was sent to [facility] for further care.     Plan:     Fall  Fall precautions  OT and OT eval   Orthostatic vitals   check vitamin b12,   TSH level is ok   DVT prophylaxis as per Primary team   continue cardiac monitoring     left sided multiple rib fractures:   Multimodal pain regimen including tylenol, robaxin, lidocaine and hydromorphone  if not helping might need pain management and nerves block   O2 monitoring   IS   Serial imaging to make sure no hemo or Pneumothorax  has sub acute fracture of the 11th rib might be from previous fall    Hx of Alcohol Abuse:   Will suggest [facility] protocol   thiamine, folic acid and multivitamin supplement    Hx of smoking:   counselling for cessation, he does not wanted to stop it.     Hx of HLD: continue with atorvastatin 20 milliGRAM(s) Oral at bedtime    Hx of Anxiety: Will continue with DULoxetine 30 milliGRAM(s) Oral daily    Hx of BPH: will continue with finasteride 5 milliGRAM(s) Oral daily and tamsulosin 0.4 milliGRAM(s) Oral at bedtime, will monitor for urine retention    Hx of Hypothyroidism: Will continue with levothyroxine 25 MICROGram(s) Oral daily    Hx of HTN: will continue with metoprolol tartrate 25 milliGRAM(s) Oral two times a day    Prediabetes: Hb a1c is 6, counselling for low carb diet     Anemia: Not sure if acute, no previous Hb level, current Hb is ~10, would monitor CBC, if trends down then would transfuse.     Hyponatremia: Na is ~133, monitor BMP.     Identification of Seniors at Risk:                                                                                                                                       Before the event that brought you to the hospital, did you need someone to help you on a regular basis?   No  In the 24 hours before your injury, have you needed more help than usual?                                                No          Have you been hospitalized for one or more nights during the past six months?                                         No          In general, do you have serious problems with your vision that cannot be corrected with glasses?                No   In general, do you have serious problems with your memory?                                                                    No               Do you take six or more different medications every day?                                                                          Yes                    A positive screen is an aswer of yes to 2 or more - Total:  Screening is negative.     Medicine team is signing off, please call as needed.

## 2024-03-26 NOTE — PHYSICAL THERAPY INITIAL EVALUATION ADULT - LEVEL OF INDEPENDENCE: GAIT, REHAB EVAL
5 feet @ Min Assist without assist device 2*2 impaired balance, remaining gait assessment pt required contact guard with RW, balance slightly improved, verbal cues needed for safety/RW position

## 2024-03-26 NOTE — CHART NOTE - NSCHARTNOTEFT_GEN_A_CORE
Tertiary Trauma Survey (TTS)    Date of TTS: 03-26-24 @ 12:11                             Admit Date: 03-25-24 @ 18:46       List Injuries Identified to Date:  1. Acute displaced fractures of left 7t-10th ribs  2. Subacute displaced fracture of the left 11th rib  3. Possible minimal left hemothorax on POCUS    List Operative and Interventional Radiological Procedures: None    Physical Exam:  GEN: resting in bed, mild distress 2/2 pain  HEAD: normocephalic, nontender to palpation   NECK: no JVD, nontender to palpation   CHEST: nontender to palpation across clavicles. TTP across left anterior ribs, nontender right anterior ribs  BACK: nontender to palpation along midline and right posterior ribs. TTP across left posterior ribs  ABD: soft, nontender, nondistended   EXTREM: b/l UE non-tender to palpation                   b/l LE non-tender to palpation                    Moving all extremities spontaneously; warm, well-perfused, palpable distal pulses   NEURO: AOx3, no focal neuro deficits       RADIOLOGICAL FINDINGS REVIEW:  CT Chest/Abd/Pelvis Acute displaced fractures of the left seventh through 10th ribs, subacute displaced fracture left 11th rib. No pneumothorax or hemothorax.         INCIDENTAL FINDINGS: None      [x] Tertiary exam complete, there are no new injuries identified

## 2024-03-26 NOTE — PROGRESS NOTE ADULT - TIME BILLING
Reports pain when moving but comfortable at rest.  Strong cough.  IS just at 1000cc    GEN:  Awake, alert, oriented to person, place and month as well as situation  NEURO:  Moves all four extremities without deficit  NECK:  Non tender, no step-offs  BACK:  Non tender, no step-offs  CVS:  RRR  PUL:  Coarse upper airway sounds that clear with cough, noted to splint w/deep breathing, tender along lateral left chest wall without ecchymosis or abrasions noted  ABD:  Soft, non tender, non distended  EXT:  Warm, no edema    Fall  Rib fractures (7-10) displaced  hyponatremia    -Pain currently only controlled when moving, patient needs to get oob today, will give prn dilaudid x 2, if pain is still much and prohibits moving then will plan for patient to get rib block  -Currently HD stable w/preserved SBP in setting of bradycardia -on metoprolol  -Breathing comfortably on RA, CXR w/possible small effusion -verified on POCUS on left side, will repeat CXR later this afternoon to see if effusion is growing and if patient would benefit from a chest tube for drainage  -H/H stable, scds, sqh  -Cr at baseline (1.4-1.9), voiding, follow trend  -Synthroid  -PIV  To remain in critical care setting until pain is under better control and will allow patient to get oob.  Patient is a DNR but would accept a trial of intubation

## 2024-03-26 NOTE — PHYSICAL THERAPY INITIAL EVALUATION ADULT - PERTINENT HX OF CURRENT PROBLEM, REHAB EVAL
78 M with Alcohol use disorder, Benign essential HTN, BPH, Hypothyroidism,  Smoker,  presents as s/p fall out of bed with + head strike on the night stand, no LOC. He landed on his Left side but was able to stand and ambulate afterwards, imaging shows Acute minimally displaced fractures of the left lateral seventh, eighth, ninth, and 10th ribs. subacute displaced fracture left 11th posterolateral rib, CT of the head and C spine showed no acute findings, Pt was sent to [facility] for further care.

## 2024-03-26 NOTE — PROGRESS NOTE ADULT - SUBJECTIVE AND OBJECTIVE BOX
24h Events: Patient admitted to the ICU secondary to L 7-10 displaced rib fractures s/p mechanical fall. Patient has been treated with multi-modal pain regimen; however, pain is still uncontrolled. Patient had been started on .2 mg dilaudid for increased pain control alongside current pain modalities.       REVIEW OF SYSTEMS:  CONSTITUTIONAL: No weakness, fevers or chills  EYES/ENT: No visual changes;  No vertigo or throat pain   RESPIRATORY: Mild cough secondary to long smoking history, no wheezing or hemoptysis; intermittent tachypnea secondary to pain  CARDIOVASCULAR: No chest pain or palpitations  GASTROINTESTINAL: No abdominal or epigastric pain. No nausea, vomiting, or hematemesis; No diarrhea or constipation. No melena or hematochezia.  GENITOURINARY: No dysuria, frequency or hematuria  NEUROLOGICAL: No numbness or weakness  MSK: No joint pain, paresthesias, pain localized to L chest wall over ribs 7-10  SKIN: No itching, rashes    ICU Vital Signs Last 24 Hrs  T(C): 37.1 (26 Mar 2024 08:26), Max: 37.1 (26 Mar 2024 08:26)  T(F): 98.7 (26 Mar 2024 08:26), Max: 98.7 (26 Mar 2024 08:26)  HR: 61 (26 Mar 2024 11:00) (50 - 73)  BP: 123/73 (26 Mar 2024 11:00) (91/68 - 174/89)  BP(mean): 90 (26 Mar 2024 11:00) (73 - 115)  ABP: --  ABP(mean): --  RR: 17 (26 Mar 2024 11:00) (11 - 20)  SpO2: 98% (26 Mar 2024 11:00) (95% - 100%)    O2 Parameters below as of 26 Mar 2024 11:00  Patient On (Oxygen Delivery Method): room air            I&O's Detail    25 Mar 2024 07:01  -  26 Mar 2024 07:00  --------------------------------------------------------  IN:    IV PiggyBack: 50 mL    sodium chloride 0.9%: 900 mL    Sodium Chloride 0.9% Bolus: 500 mL  Total IN: 1450 mL    OUT:    Voided (mL): 1000 mL  Total OUT: 1000 mL    Total NET: 450 mL      26 Mar 2024 07:01  -  26 Mar 2024 11:57  --------------------------------------------------------  IN:    sodium chloride 0.9%: 500 mL  Total IN: 500 mL    OUT:    Voided (mL): 100 mL  Total OUT: 100 mL    Total NET: 400 mL              MEDICATIONS  (STANDING):  acetaminophen   IVPB .. 1000 milliGRAM(s) IV Intermittent every 6 hours  atorvastatin 20 milliGRAM(s) Oral at bedtime  chlorhexidine 2% Cloths 1 Application(s) Topical daily  DULoxetine 30 milliGRAM(s) Oral daily  finasteride 5 milliGRAM(s) Oral daily  gabapentin 100 milliGRAM(s) Oral three times a day  heparin   Injectable 5000 Unit(s) SubCutaneous every 8 hours  influenza  Vaccine (HIGH DOSE) 0.7 milliLiter(s) IntraMuscular once  levothyroxine 25 MICROGram(s) Oral daily  lidocaine   4% Patch 3 Patch Transdermal every 24 hours  methocarbamol IVPB 1000 milliGRAM(s) IV Intermittent every 8 hours  metoprolol tartrate 25 milliGRAM(s) Oral two times a day  nicotine - 21 mG/24Hr(s) Patch 1 Patch Transdermal daily  polyethylene glycol 3350 17 Gram(s) Oral daily  sodium chloride 0.9%. 1000 milliLiter(s) (100 mL/Hr) IV Continuous <Continuous>  sucralfate 1 Gram(s) Oral two times a day  tamsulosin 0.4 milliGRAM(s) Oral at bedtime    MEDICATIONS  (PRN):  oxyCODONE    IR 2.5 milliGRAM(s) Oral every 4 hours PRN Moderate Pain (4 - 6)  oxyCODONE    IR 5 milliGRAM(s) Oral every 4 hours PRN Severe Pain (7 - 10)      Physical Exam:    Gen: Resting comfortably in bed, uncomfortable with movement due to left sided pain discomfort.     HEENT: PERRL, EOMI    Neurological: Alert and oriented x3 without focal deficit. no acute distress     Neck: Trachea midline, no evidence of JVD    Pulmonary: CTA B/L,  equal rise and fall of the chest. paroxysmal tachypnea during movement secondary to discomfort.     Cardiovascular: regular rate and rhythm    Gastrointestinal: Soft, non-tender, non-distended    : voiding     Extremities: Without clubbing/cyanosis/edema    Musculoskeletal: localized pain and discomfort to L chest wall over ribs 7-10. MSK pain worse upon manipulation and inspiration    Skin: Intact    LABS:  CBC Full  -  ( 26 Mar 2024 02:50 )  WBC Count : 7.45 K/uL  RBC Count : 3.49 M/uL  Hemoglobin : 9.9 g/dL  Hematocrit : 30.0 %  Platelet Count - Automated : 256 K/uL  Mean Cell Volume : 86.0 fl  Mean Cell Hemoglobin : 28.4 pg  Mean Cell Hemoglobin Concentration : 33.0 gm/dL  Auto Neutrophil # : 4.54 K/uL  Auto Lymphocyte # : 2.16 K/uL  Auto Monocyte # : 0.52 K/uL  Auto Eosinophil # : 0.12 K/uL  Auto Basophil # : 0.03 K/uL  Auto Neutrophil % : 60.9 %  Auto Lymphocyte % : 29.0 %  Auto Monocyte % : 7.0 %  Auto Eosinophil % : 1.6 %  Auto Basophil % : 0.4 %    03-26    133<L>  |  101  |  14.8  ----------------------------<  118<H>  4.2   |  21.0<L>  |  1.17    Ca    8.5      26 Mar 2024 04:28  Phos  3.8     03-26  Mg     1.8     03-26    TPro  6.3<L>  /  Alb  3.4  /  TBili  0.3<L>  /  DBili  x   /  AST  13  /  ALT  8   /  AlkPhos  55  03-26    PT/INR - ( 25 Mar 2024 20:47 )   PT: 17.7 sec;   INR: 1.62 ratio         PTT - ( 25 Mar 2024 20:47 )  PTT:33.5 sec  Urinalysis Basic - ( 26 Mar 2024 04:28 )    Color: x / Appearance: x / SG: x / pH: x  Gluc: 118 mg/dL / Ketone: x  / Bili: x / Urobili: x   Blood: x / Protein: x / Nitrite: x   Leuk Esterase: x / RBC: x / WBC x   Sq Epi: x / Non Sq Epi: x / Bacteria: x      RECENT CULTURES:      LIVER FUNCTIONS - ( 26 Mar 2024 04:28 )  Alb: 3.4 g/dL / Pro: 6.3 g/dL / ALK PHOS: 55 U/L / ALT: 8 U/L / AST: 13 U/L / GGT: x

## 2024-03-26 NOTE — PHYSICAL THERAPY INITIAL EVALUATION ADULT - RANGE OF MOTION EXAMINATION, REHAB EVAL
You can access the FollowMyHealth Patient Portal offered by [facility] by registering at the following website: [email or web address]. By joining [employer]’s FollowMyHealth portal, you will also be able to view your health information using other applications (apps) compatible with our system.
bilateral upper extremity ROM was WFL (within functional limits)/bilateral lower extremity ROM was WFL (within functional limits)

## 2024-03-26 NOTE — PHYSICAL THERAPY INITIAL EVALUATION ADULT - ADDITIONAL COMMENTS
Pt lives in an assisted living facility, has 2 supportive daughters nearby. No steps to navigate. Pt was independent PTA without an assist device and independent with ADLs. Pt owns a SAC only.

## 2024-03-26 NOTE — CONSULT NOTE ADULT - SUBJECTIVE AND OBJECTIVE BOX
78 M with Alcohol use disorder, Benign essential HTN, BPH, Hypothyroidism,  Smoker,  presents as transfer from [facility] s/p fall with L 4-10 rib fractures. Patient fell out of bed in morning and hit his head on the night stand, no LOC. He landed on his L side but was able to stand and ambulate afterwards, his imaging done showed Acute minimally displaced fractures of the left lateral seventh, eighth, ninth, and 10th ribs. subacute displaced fracture left 11th posterolateral rib, CT of the head and C spine showed no acute findings, he was sent to [facility] for further care         Allergies:  	No Known Allergies:     PAST MEDICAL HISTORY:  Alcohol use disorder, mild, abuse     Benign essential HTN     BPH (benign prostatic hyperplasia)     Hypothyroidism     Smokes tobacco daily.     PAST SURGICAL HISTORY:  Coronary stent patent     S/P CABG (coronary artery bypass graft).     Social History:  Not a smoker, drinker or using any drugs      Labs and Results:  Labs, Radiology, Cardiology, and Other Results: IMAGING:  CT Chest   FINDINGS:     Right perihilar right middle lobe blebs. No focal consolidation..    Epigastric ventral hernia containing a knuckle of nonobstructive large bowel.   Aortoiliac stent graft. Bilateral nonspecific perinephric stranding.     IMPRESSION:      Acute displaced fractures of the left seventh through 10th ribs.  Subacute displaced fracture left 11th rib.  No pneumothorax or hemothorax. No other acute finding on this noncontrast study.    CT Head:  IMPRESSION:    CT head: No acute intracranial hemorrhage or mass effect.    CT cervical spine: No evidence for acute displaced fracture or malalignment.              LABS:                        9.9    7.45  )-----------( 256      ( 26 Mar 2024 02:50 )             30.0     03-26    133<L>  |  101  |  14.8  ----------------------------<  118<H>  4.2   |  21.0<L>  |  1.17    Ca    8.5      26 Mar 2024 04:28  Phos  3.8     03-26  Mg     1.8     03-26    TPro  6.3<L>  /  Alb  3.4  /  TBili  0.3<L>  /  DBili  x   /  AST  13  /  ALT  8   /  AlkPhos  55  03-26    PT/INR - ( 25 Mar 2024 20:47 )   PT: 17.7 sec;   INR: 1.62 ratio         PTT - ( 25 Mar 2024 20:47 )  PTT:33.5 sec      I&O's Summary    25 Mar 2024 07:01  -  26 Mar 2024 07:00  --------------------------------------------------------  IN: 1450 mL / OUT: 1000 mL / NET: 450 mL    26 Mar 2024 07:01  -  26 Mar 2024 12:53  --------------------------------------------------------  IN: 500 mL / OUT: 100 mL / NET: 400 mL        MEDICATIONS  (STANDING):  acetaminophen   IVPB .. 1000 milliGRAM(s) IV Intermittent every 6 hours  atorvastatin 20 milliGRAM(s) Oral at bedtime  chlorhexidine 2% Cloths 1 Application(s) Topical daily  DULoxetine 30 milliGRAM(s) Oral daily  finasteride 5 milliGRAM(s) Oral daily  gabapentin 100 milliGRAM(s) Oral three times a day  heparin   Injectable 5000 Unit(s) SubCutaneous every 8 hours  influenza  Vaccine (HIGH DOSE) 0.7 milliLiter(s) IntraMuscular once  levothyroxine 25 MICROGram(s) Oral daily  lidocaine   4% Patch 3 Patch Transdermal every 24 hours  methocarbamol IVPB 1000 milliGRAM(s) IV Intermittent every 8 hours  metoprolol tartrate 25 milliGRAM(s) Oral two times a day  nicotine - 21 mG/24Hr(s) Patch 1 Patch Transdermal daily  polyethylene glycol 3350 17 Gram(s) Oral daily  sodium chloride 0.9%. 1000 milliLiter(s) (100 mL/Hr) IV Continuous <Continuous>  sucralfate 1 Gram(s) Oral two times a day  tamsulosin 0.4 milliGRAM(s) Oral at bedtime    MEDICATIONS  (PRN):  oxyCODONE    IR 2.5 milliGRAM(s) Oral every 4 hours PRN Moderate Pain (4 - 6)  oxyCODONE    IR 5 milliGRAM(s) Oral every 4 hours PRN Severe Pain (7 - 10)           78 M with Alcohol use disorder, Benign essential HTN, BPH, Hypothyroidism,  Smoker,  presents as transfer from [facility] s/p fall with L 4-10 rib fractures. Patient fell out of bed in morning and hit his head on the night stand, no LOC. He landed on his L side but was able to stand and ambulate afterwards, his imaging done showed Acute minimally displaced fractures of the left lateral seventh, eighth, ninth, and 10th ribs. subacute displaced fracture left 11th posterolateral rib, CT of the head and C spine showed no acute findings, he was sent to [facility] for further care, as per him he did not hit his head, he did not loose consciousness, this is first time he fall down and it happened while he was sleeping, he denies tongue bite, loss of urine or bowel movement.          Allergies:  	No Known Allergies:     PAST MEDICAL HISTORY:  Alcohol use disorder, mild, abuse     Benign essential HTN     BPH (benign prostatic hyperplasia)     Hypothyroidism     Smokes tobacco daily.     PAST SURGICAL HISTORY:  Coronary stent patent     S/P CABG (coronary artery bypass graft).     Social History:  Not a smoker, drinker or using any drugs      Labs and Results:  Labs, Radiology, Cardiology, and Other Results: IMAGING:  CT Chest   FINDINGS:     Right perihilar right middle lobe blebs. No focal consolidation..    Epigastric ventral hernia containing a knuckle of nonobstructive large bowel.   Aortoiliac stent graft. Bilateral nonspecific perinephric stranding.     IMPRESSION:      Acute displaced fractures of the left seventh through 10th ribs.  Subacute displaced fracture left 11th rib.  No pneumothorax or hemothorax. No other acute finding on this noncontrast study.    CT Head:  IMPRESSION:    CT head: No acute intracranial hemorrhage or mass effect.    CT cervical spine: No evidence for acute displaced fracture or malalignment.      Vital Signs Last 24 Hrs  T(C): 36.6 (26 Mar 2024 20:44), Max: 37.1 (26 Mar 2024 08:26)  T(F): 97.8 (26 Mar 2024 20:44), Max: 98.7 (26 Mar 2024 08:26)  HR: 70 (26 Mar 2024 18:00) (50 - 76)  BP: 121/74 (26 Mar 2024 18:00) (104/81 - 161/83)  BP(mean): 91 (26 Mar 2024 18:00) (73 - 114)  RR: 19 (26 Mar 2024 18:00) (10 - 23)  SpO2: 96% (26 Mar 2024 18:00) (94% - 99%)    Parameters below as of 26 Mar 2024 18:00  Patient On (Oxygen Delivery Method): room air        PHYSICAL EXAM:    GENERAL: Elderly male looking comfortable   HEENT: PERRL, +EOMI  NECK: soft, Supple, No JVD   CHEST/LUNG: Decrease air entry bilaterally; No wheezing  HEART: S1S2+, Regular rate and rhythm; No murmurs  ABDOMEN: Soft, Nontender, Nondistended; Bowel sounds present  EXTREMITIES:  1+ Peripheral Pulses, No edema  SKIN: No rashes or lesions  NEURO: AAOX3  PSYCH: normal mood            LABS:                        9.9    7.45  )-----------( 256      ( 26 Mar 2024 02:50 )             30.0     03-26    133<L>  |  101  |  14.8  ----------------------------<  118<H>  4.2   |  21.0<L>  |  1.17    Ca    8.5      26 Mar 2024 04:28  Phos  3.8     03-26  Mg     1.8     03-26    TPro  6.3<L>  /  Alb  3.4  /  TBili  0.3<L>  /  DBili  x   /  AST  13  /  ALT  8   /  AlkPhos  55  03-26    PT/INR - ( 25 Mar 2024 20:47 )   PT: 17.7 sec;   INR: 1.62 ratio         PTT - ( 25 Mar 2024 20:47 )  PTT:33.5 sec      I&O's Summary    25 Mar 2024 07:01  -  26 Mar 2024 07:00  --------------------------------------------------------  IN: 1450 mL / OUT: 1000 mL / NET: 450 mL    26 Mar 2024 07:01  -  26 Mar 2024 12:53  --------------------------------------------------------  IN: 500 mL / OUT: 100 mL / NET: 400 mL        MEDICATIONS  (STANDING):  acetaminophen   IVPB .. 1000 milliGRAM(s) IV Intermittent every 6 hours  atorvastatin 20 milliGRAM(s) Oral at bedtime  chlorhexidine 2% Cloths 1 Application(s) Topical daily  DULoxetine 30 milliGRAM(s) Oral daily  finasteride 5 milliGRAM(s) Oral daily  gabapentin 100 milliGRAM(s) Oral three times a day  heparin   Injectable 5000 Unit(s) SubCutaneous every 8 hours  influenza  Vaccine (HIGH DOSE) 0.7 milliLiter(s) IntraMuscular once  levothyroxine 25 MICROGram(s) Oral daily  lidocaine   4% Patch 3 Patch Transdermal every 24 hours  methocarbamol IVPB 1000 milliGRAM(s) IV Intermittent every 8 hours  metoprolol tartrate 25 milliGRAM(s) Oral two times a day  nicotine - 21 mG/24Hr(s) Patch 1 Patch Transdermal daily  polyethylene glycol 3350 17 Gram(s) Oral daily  sodium chloride 0.9%. 1000 milliLiter(s) (100 mL/Hr) IV Continuous <Continuous>  sucralfate 1 Gram(s) Oral two times a day  tamsulosin 0.4 milliGRAM(s) Oral at bedtime    MEDICATIONS  (PRN):  oxyCODONE    IR 2.5 milliGRAM(s) Oral every 4 hours PRN Moderate Pain (4 - 6)  oxyCODONE    IR 5 milliGRAM(s) Oral every 4 hours PRN Severe Pain (7 - 10)

## 2024-03-26 NOTE — GOALS OF CARE CONVERSATION - ADVANCED CARE PLANNING - CONVERSATION DETAILS
MOLST discussed with patient. Pt states he desires to continue DNR status. Pt is willing to be intubated but does not desire prolonged intubation or tracheostomy. Patient states he does not want any extensive life sustaining measures. MOLST form completed.

## 2024-03-26 NOTE — PHYSICAL THERAPY INITIAL EVALUATION ADULT - CRITERIA FOR SKILLED THERAPEUTIC INTERVENTIONS
return to assisted living with RW, home PT, assist PRN/anticipated equipment needs at discharge/anticipated discharge recommendation

## 2024-03-27 LAB
ACETONE SERPL-MCNC: NEGATIVE — SIGNIFICANT CHANGE UP
ANION GAP SERPL CALC-SCNC: 10 MMOL/L — SIGNIFICANT CHANGE UP (ref 5–17)
ANION GAP SERPL CALC-SCNC: 14 MMOL/L — SIGNIFICANT CHANGE UP (ref 5–17)
APPEARANCE UR: CLEAR — SIGNIFICANT CHANGE UP
BACTERIA # UR AUTO: ABNORMAL /HPF
BILIRUB UR-MCNC: NEGATIVE — SIGNIFICANT CHANGE UP
BUN SERPL-MCNC: 14.8 MG/DL — SIGNIFICANT CHANGE UP (ref 8–20)
BUN SERPL-MCNC: 17.6 MG/DL — SIGNIFICANT CHANGE UP (ref 8–20)
CALCIUM SERPL-MCNC: 8.9 MG/DL — SIGNIFICANT CHANGE UP (ref 8.4–10.5)
CALCIUM SERPL-MCNC: 9.2 MG/DL — SIGNIFICANT CHANGE UP (ref 8.4–10.5)
CAST: 1 /LPF — SIGNIFICANT CHANGE UP (ref 0–4)
CHLORIDE SERPL-SCNC: 100 MMOL/L — SIGNIFICANT CHANGE UP (ref 96–108)
CHLORIDE SERPL-SCNC: 101 MMOL/L — SIGNIFICANT CHANGE UP (ref 96–108)
CHLORIDE UR-SCNC: 105 MMOL/L — SIGNIFICANT CHANGE UP
CO2 SERPL-SCNC: 18 MMOL/L — LOW (ref 22–29)
CO2 SERPL-SCNC: 23 MMOL/L — SIGNIFICANT CHANGE UP (ref 22–29)
COLOR SPEC: YELLOW — SIGNIFICANT CHANGE UP
CREAT SERPL-MCNC: 1.18 MG/DL — SIGNIFICANT CHANGE UP (ref 0.5–1.3)
CREAT SERPL-MCNC: 1.19 MG/DL — SIGNIFICANT CHANGE UP (ref 0.5–1.3)
DIFF PNL FLD: NEGATIVE — SIGNIFICANT CHANGE UP
EGFR: 63 ML/MIN/1.73M2 — SIGNIFICANT CHANGE UP
EGFR: 64 ML/MIN/1.73M2 — SIGNIFICANT CHANGE UP
GLUCOSE SERPL-MCNC: 103 MG/DL — HIGH (ref 70–99)
GLUCOSE SERPL-MCNC: 113 MG/DL — HIGH (ref 70–99)
GLUCOSE UR QL: NEGATIVE MG/DL — SIGNIFICANT CHANGE UP
HCT VFR BLD CALC: 28.1 % — LOW (ref 39–50)
HGB BLD-MCNC: 9.5 G/DL — LOW (ref 13–17)
KETONES UR-MCNC: NEGATIVE MG/DL — SIGNIFICANT CHANGE UP
LEUKOCYTE ESTERASE UR-ACNC: ABNORMAL
MAGNESIUM SERPL-MCNC: 1.9 MG/DL — SIGNIFICANT CHANGE UP (ref 1.6–2.6)
MAGNESIUM SERPL-MCNC: 2 MG/DL — SIGNIFICANT CHANGE UP (ref 1.6–2.6)
MCHC RBC-ENTMCNC: 28.3 PG — SIGNIFICANT CHANGE UP (ref 27–34)
MCHC RBC-ENTMCNC: 33.8 GM/DL — SIGNIFICANT CHANGE UP (ref 32–36)
MCV RBC AUTO: 83.6 FL — SIGNIFICANT CHANGE UP (ref 80–100)
NITRITE UR-MCNC: POSITIVE
OSMOLALITY UR: 320 MOSM/KG — SIGNIFICANT CHANGE UP (ref 300–1000)
PH UR: 6 — SIGNIFICANT CHANGE UP (ref 5–8)
PHOSPHATE SERPL-MCNC: 2.6 MG/DL — SIGNIFICANT CHANGE UP (ref 2.4–4.7)
PHOSPHATE SERPL-MCNC: 3.5 MG/DL — SIGNIFICANT CHANGE UP (ref 2.4–4.7)
PLATELET # BLD AUTO: 290 K/UL — SIGNIFICANT CHANGE UP (ref 150–400)
POTASSIUM SERPL-MCNC: 4.7 MMOL/L — SIGNIFICANT CHANGE UP (ref 3.5–5.3)
POTASSIUM SERPL-MCNC: 4.9 MMOL/L — SIGNIFICANT CHANGE UP (ref 3.5–5.3)
POTASSIUM SERPL-SCNC: 4.7 MMOL/L — SIGNIFICANT CHANGE UP (ref 3.5–5.3)
POTASSIUM SERPL-SCNC: 4.9 MMOL/L — SIGNIFICANT CHANGE UP (ref 3.5–5.3)
PROT UR-MCNC: NEGATIVE MG/DL — SIGNIFICANT CHANGE UP
RBC # BLD: 3.36 M/UL — LOW (ref 4.2–5.8)
RBC # FLD: 14.8 % — HIGH (ref 10.3–14.5)
RBC CASTS # UR COMP ASSIST: 0 /HPF — SIGNIFICANT CHANGE UP (ref 0–4)
SODIUM SERPL-SCNC: 133 MMOL/L — LOW (ref 135–145)
SODIUM SERPL-SCNC: 133 MMOL/L — LOW (ref 135–145)
SODIUM UR-SCNC: 102 MMOL/L — SIGNIFICANT CHANGE UP
SP GR SPEC: 1.01 — SIGNIFICANT CHANGE UP (ref 1–1.03)
SQUAMOUS # UR AUTO: 1 /HPF — SIGNIFICANT CHANGE UP (ref 0–5)
UROBILINOGEN FLD QL: 1 MG/DL — SIGNIFICANT CHANGE UP (ref 0.2–1)
VIT B12 SERPL-MCNC: 505 PG/ML — SIGNIFICANT CHANGE UP (ref 232–1245)
WBC # BLD: 9.99 K/UL — SIGNIFICANT CHANGE UP (ref 3.8–10.5)
WBC # FLD AUTO: 9.99 K/UL — SIGNIFICANT CHANGE UP (ref 3.8–10.5)
WBC UR QL: 71 /HPF — HIGH (ref 0–5)

## 2024-03-27 RX ORDER — ACETAMINOPHEN 500 MG
1000 TABLET ORAL ONCE
Refills: 0 | Status: COMPLETED | OUTPATIENT
Start: 2024-03-27 | End: 2024-03-27

## 2024-03-27 RX ORDER — SODIUM,POTASSIUM PHOSPHATES 278-250MG
1 POWDER IN PACKET (EA) ORAL ONCE
Refills: 0 | Status: COMPLETED | OUTPATIENT
Start: 2024-03-27 | End: 2024-03-27

## 2024-03-27 RX ORDER — MAGNESIUM SULFATE 500 MG/ML
1 VIAL (ML) INJECTION ONCE
Refills: 0 | Status: COMPLETED | OUTPATIENT
Start: 2024-03-27 | End: 2024-03-27

## 2024-03-27 RX ORDER — METHOCARBAMOL 500 MG/1
500 TABLET, FILM COATED ORAL ONCE
Refills: 0 | Status: COMPLETED | OUTPATIENT
Start: 2024-03-27 | End: 2024-03-27

## 2024-03-27 RX ORDER — APIXABAN 2.5 MG/1
2.5 TABLET, FILM COATED ORAL EVERY 12 HOURS
Refills: 0 | Status: DISCONTINUED | OUTPATIENT
Start: 2024-03-27 | End: 2024-04-01

## 2024-03-27 RX ORDER — KETOROLAC TROMETHAMINE 30 MG/ML
15 SYRINGE (ML) INJECTION ONCE
Refills: 0 | Status: DISCONTINUED | OUTPATIENT
Start: 2024-03-27 | End: 2024-03-27

## 2024-03-27 RX ORDER — APIXABAN 2.5 MG/1
1 TABLET, FILM COATED ORAL
Refills: 0 | DISCHARGE

## 2024-03-27 RX ORDER — ACETAMINOPHEN 500 MG
975 TABLET ORAL EVERY 6 HOURS
Refills: 0 | Status: DISCONTINUED | OUTPATIENT
Start: 2024-03-28 | End: 2024-04-01

## 2024-03-27 RX ORDER — KETOROLAC TROMETHAMINE 30 MG/ML
15 SYRINGE (ML) INJECTION EVERY 6 HOURS
Refills: 0 | Status: DISCONTINUED | OUTPATIENT
Start: 2024-03-28 | End: 2024-03-28

## 2024-03-27 RX ORDER — SODIUM CHLORIDE 9 MG/ML
1000 INJECTION INTRAMUSCULAR; INTRAVENOUS; SUBCUTANEOUS
Refills: 0 | Status: DISCONTINUED | OUTPATIENT
Start: 2024-03-27 | End: 2024-03-27

## 2024-03-27 RX ORDER — SENNA PLUS 8.6 MG/1
2 TABLET ORAL AT BEDTIME
Refills: 0 | Status: DISCONTINUED | OUTPATIENT
Start: 2024-03-27 | End: 2024-04-01

## 2024-03-27 RX ADMIN — FINASTERIDE 5 MILLIGRAM(S): 5 TABLET, FILM COATED ORAL at 10:54

## 2024-03-27 RX ADMIN — Medication 100 GRAM(S): at 05:18

## 2024-03-27 RX ADMIN — LIDOCAINE 3 PATCH: 4 CREAM TOPICAL at 06:00

## 2024-03-27 RX ADMIN — TAMSULOSIN HYDROCHLORIDE 0.4 MILLIGRAM(S): 0.4 CAPSULE ORAL at 22:07

## 2024-03-27 RX ADMIN — CHLORHEXIDINE GLUCONATE 1 APPLICATION(S): 213 SOLUTION TOPICAL at 11:08

## 2024-03-27 RX ADMIN — GABAPENTIN 100 MILLIGRAM(S): 400 CAPSULE ORAL at 22:08

## 2024-03-27 RX ADMIN — Medication 25 MILLIGRAM(S): at 05:15

## 2024-03-27 RX ADMIN — Medication 25 MILLIGRAM(S): at 17:35

## 2024-03-27 RX ADMIN — OXYCODONE HYDROCHLORIDE 5 MILLIGRAM(S): 5 TABLET ORAL at 22:07

## 2024-03-27 RX ADMIN — HEPARIN SODIUM 5000 UNIT(S): 5000 INJECTION INTRAVENOUS; SUBCUTANEOUS at 05:15

## 2024-03-27 RX ADMIN — Medication 1 GRAM(S): at 17:35

## 2024-03-27 RX ADMIN — Medication 1 TABLET(S): at 10:54

## 2024-03-27 RX ADMIN — Medication 3 MILLIGRAM(S): at 22:07

## 2024-03-27 RX ADMIN — Medication 400 MILLIGRAM(S): at 23:07

## 2024-03-27 RX ADMIN — Medication 1 PACKET(S): at 05:15

## 2024-03-27 RX ADMIN — POLYETHYLENE GLYCOL 3350 17 GRAM(S): 17 POWDER, FOR SOLUTION ORAL at 10:54

## 2024-03-27 RX ADMIN — OXYCODONE HYDROCHLORIDE 5 MILLIGRAM(S): 5 TABLET ORAL at 23:07

## 2024-03-27 RX ADMIN — Medication 25 MICROGRAM(S): at 05:31

## 2024-03-27 RX ADMIN — APIXABAN 2.5 MILLIGRAM(S): 2.5 TABLET, FILM COATED ORAL at 17:35

## 2024-03-27 RX ADMIN — SENNA PLUS 2 TABLET(S): 8.6 TABLET ORAL at 22:07

## 2024-03-27 RX ADMIN — GABAPENTIN 100 MILLIGRAM(S): 400 CAPSULE ORAL at 16:04

## 2024-03-27 RX ADMIN — ATORVASTATIN CALCIUM 20 MILLIGRAM(S): 80 TABLET, FILM COATED ORAL at 22:08

## 2024-03-27 RX ADMIN — DULOXETINE HYDROCHLORIDE 30 MILLIGRAM(S): 30 CAPSULE, DELAYED RELEASE ORAL at 10:54

## 2024-03-27 RX ADMIN — Medication 1 GRAM(S): at 05:18

## 2024-03-27 RX ADMIN — Medication 15 MILLIGRAM(S): at 23:08

## 2024-03-27 RX ADMIN — GABAPENTIN 100 MILLIGRAM(S): 400 CAPSULE ORAL at 05:15

## 2024-03-27 RX ADMIN — METHOCARBAMOL 500 MILLIGRAM(S): 500 TABLET, FILM COATED ORAL at 23:08

## 2024-03-27 NOTE — DISCHARGE NOTE PROVIDER - NSDCCPCAREPLAN_GEN_ALL_CORE_FT
PRINCIPAL DISCHARGE DIAGNOSIS  Diagnosis: Fracture of four ribs of left side  Assessment and Plan of Treatment: Follow Up: Please call to make an appointment with your primary care physician as per your usual schedule. You may follow up with the Acute Care Surgery clinic as needed.   Activity: May return to normal activities as tolerated, however refrain from heavy lifting >10-15 pounds.   Diet: May continue regular diet.  Medications: Please take all home medications as prescribed by your primary care doctor. Pain medication has been prescribed for you. Please take it as prescribed, do not drive or operate heavy machinery while taking narcotics. You are encouraged to take over-the-counter tylenol and/or ibuprofen for pain relief when you feel your pain no longer warrants the use of narcotic pain medications.  Patient is advised to RETURN TO THE EMERGENCY DEPARTMENT for any of the following - worsening pain, fever/chills, nausea/vomiting, alterned mental status, chest pain, shortness of breath, or any other new/worsening symptoms.

## 2024-03-27 NOTE — PROGRESS NOTE ADULT - SUBJECTIVE AND OBJECTIVE BOX
SCOTT WARE    [id number]    78y      Male    Patient is a 78y old  Male who presents with a chief complaint of Rib fracture (27 Mar 2024 04:00)      INTERVAL HPI/OVERNIGHT EVENTS:  Patient's pain is well controlled, denies fever, chills, chest pain, sob     Vital Signs Last 24 Hrs  T(C): 36.3 (27 Mar 2024 04:05), Max: 37.1 (26 Mar 2024 23:39)  T(F): 97.4 (27 Mar 2024 04:05), Max: 98.7 (26 Mar 2024 23:39)  HR: 61 (27 Mar 2024 09:00) (52 - 78)  BP: 159/107 (27 Mar 2024 08:00) (104/81 - 159/107)  BP(mean): 121 (27 Mar 2024 08:00) (81 - 121)  RR: 13 (27 Mar 2024 09:00) (10 - 27)  SpO2: 96% (27 Mar 2024 09:00) (90% - 100%)    Parameters below as of 27 Mar 2024 06:00  Patient On (Oxygen Delivery Method): room air        PHYSICAL EXAM:    GENERAL: Elderly male looking comfortable   HEENT: PERRL, +EOMI  NECK: soft, Supple, No JVD   CHEST/LUNG: Decrease air entry bilaterally; No wheezing  HEART: S1S2+, Regular rate and rhythm; No murmurs  ABDOMEN: Soft, Nontender, Nondistended; Bowel sounds present  EXTREMITIES:  1+ Peripheral Pulses, No edema  SKIN: No rashes or lesions  NEURO: AAOX3  PSYCH: normal mood      LABS:                        9.5    9.99  )-----------( 290      ( 27 Mar 2024 03:00 )             28.1     03-27    133<L>  |  101  |  14.8  ----------------------------<  103<H>  4.7   |  18.0<L>  |  1.19    Ca    8.9      27 Mar 2024 03:00  Phos  2.6     03-27  Mg     1.9     03-27    TPro  6.3<L>  /  Alb  3.4  /  TBili  0.3<L>  /  DBili  x   /  AST  13  /  ALT  8   /  AlkPhos  55  03-26    PT/INR - ( 25 Mar 2024 20:47 )   PT: 17.7 sec;   INR: 1.62 ratio         PTT - ( 25 Mar 2024 20:47 )  PTT:33.5 sec          I&O's Summary    26 Mar 2024 07:01  -  27 Mar 2024 07:00  --------------------------------------------------------  IN: 2400 mL / OUT: 1700 mL / NET: 700 mL    27 Mar 2024 07:01  -  27 Mar 2024 10:41  --------------------------------------------------------  IN: 0 mL / OUT: 300 mL / NET: -300 mL        MEDICATIONS  (STANDING):  atorvastatin 20 milliGRAM(s) Oral at bedtime  chlorhexidine 2% Cloths 1 Application(s) Topical daily  DULoxetine 30 milliGRAM(s) Oral daily  finasteride 5 milliGRAM(s) Oral daily  gabapentin 100 milliGRAM(s) Oral three times a day  heparin   Injectable 5000 Unit(s) SubCutaneous every 8 hours  influenza  Vaccine (HIGH DOSE) 0.7 milliLiter(s) IntraMuscular once  levothyroxine 25 MICROGram(s) Oral daily  lidocaine   4% Patch 3 Patch Transdermal every 24 hours  melatonin 3 milliGRAM(s) Oral at bedtime  metoprolol tartrate 25 milliGRAM(s) Oral two times a day  nicotine - 21 mG/24Hr(s) Patch 1 Patch Transdermal daily  polyethylene glycol 3350 17 Gram(s) Oral daily  sodium chloride 0.9%. 1000 milliLiter(s) (42 mL/Hr) IV Continuous <Continuous>  sucralfate 1 Gram(s) Oral two times a day  tamsulosin 0.4 milliGRAM(s) Oral at bedtime  trimethoprim  160 mG/sulfamethoxazole 800 mG 1 Tablet(s) Oral every 24 hours    MEDICATIONS  (PRN):  oxyCODONE    IR 2.5 milliGRAM(s) Oral every 4 hours PRN Moderate Pain (4 - 6)  oxyCODONE    IR 5 milliGRAM(s) Oral every 4 hours PRN Severe Pain (7 - 10)

## 2024-03-27 NOTE — DISCHARGE NOTE PROVIDER - NSDCMRMEDTOKEN_GEN_ALL_CORE_FT
apixaban 2.5 mg oral tablet: 1 tab(s) orally 2 times a day  atorvastatin 20 mg oral tablet: 1 tab(s) orally once a day (at bedtime)  DULoxetine 30 mg oral delayed release capsule: 1 cap(s) orally once a day  finasteride 5 mg oral tablet: 1 tab(s) orally once a day  gabapentin 100 mg oral tablet: 1 tab(s) orally once a day  levothyroxine 25 mcg (0.025 mg) oral tablet: 1 tab(s) orally once a day  Metoprolol Tartrate 25 mg oral tablet: 1 tab(s) orally 2 times a day  sucralfate 1 g oral tablet: 1 tab(s) orally 2 times a day  tamsulosin 0.4 mg oral capsule: 1 cap(s) orally once a day (at bedtime)   acetaminophen 325 mg oral tablet: 3 tab(s) orally every 6 hours  apixaban 2.5 mg oral tablet: 1 tab(s) orally 2 times a day  atorvastatin 20 mg oral tablet: 1 tab(s) orally once a day (at bedtime)  DULoxetine 30 mg oral delayed release capsule: 1 cap(s) orally once a day  finasteride 5 mg oral tablet: 1 tab(s) orally once a day  gabapentin 100 mg oral tablet: 1 tab(s) orally once a day  levothyroxine 25 mcg (0.025 mg) oral tablet: 1 tab(s) orally once a day  lidocaine 4% topical film: Apply topically to affected area once a day  methocarbamol 500 mg oral tablet: 1 tab(s) orally every 8 hours As needed Muscle Spasm  Metoprolol Tartrate 25 mg oral tablet: 1 tab(s) orally 2 times a day  oxyCODONE 5 mg oral tablet: 1 tab(s) orally every 4 hours As needed Severe Pain (7 - 10)  senna leaf extract oral tablet: 2 tab(s) orally once a day (at bedtime)  sucralfate 1 g oral tablet: 1 tab(s) orally 2 times a day  tamsulosin 0.4 mg oral capsule: 1 cap(s) orally once a day (at bedtime)

## 2024-03-27 NOTE — DISCHARGE NOTE PROVIDER - HOSPITAL COURSE
HPI: 78M presents as transfer from [facility] s/p fall with L 7-10 rib fractures. Patient fell out of bed this morning and hit his head on the night stand, no LOC. He landed on his L side but was able to stand and ambulate afterwards. Only concern was L sided chest pain.    Hospital Course: Patient had CT imaging at [facility] and was found to have traumatic injuries consisting of L 7-10 displaced rib fractures. Patient was admitted to the SICU under Dr. Johnson. pic protocol was initiated with multi-modal pain regimen. pic score improved from 6 to 9 throughout hospital stay. Patient had hyponatremia down to 133 which is consistent with baseline for him. Hospitalist was consulted for geriatric trauma consult and all recommendations were followed. Patient was found to have urinary urgency and frequency with a positive UA. Bactrim was started for a total of 5 days. PT evaluated patient and recommended home with PT and RW. Patient was OOB to chair. Voiding spotaneously. Tolerating regular diet well. Pain was well controlled at time of discharge. Patient was stable for discharge home.     HPI: 78M presents as transfer from [facility] s/p fall with L 7-10 rib fractures. Patient fell out of bed this morning and hit his head on the night stand, no LOC. He landed on his L side but was able to stand and ambulate afterwards. Only concern was L sided chest pain.    Hospital Course: Patient had CT imaging at [facility] and was found to have traumatic injuries consisting of L 7-10 displaced rib fractures. Patient was admitted to the SICU under Dr. Johnson. pic protocol was initiated with multi-modal pain regimen. pic score improved from 6 to 9 throughout hospital stay. Patient had hyponatremia down to 133 which is consistent with baseline for him. Hospitalist was consulted for geriatric trauma consult and all recommendations were followed. Patient was found to have urinary urgency and frequency with a positive UA. Culture revealed Morganella resistant to oral medications.  ID consulted and recommended for 7 day course of IV Rocephin. Midline placed.  PT evaluated patient and recommended home with PT and RW. Patient was OOB to chair. Tolerating regular diet well. Pain was well controlled at time of discharge. Patient was stable for discharge home.

## 2024-03-27 NOTE — CHART NOTE - NSCHARTNOTEFT_GEN_A_CORE
SICU TRANSFER NOTE  -----------------------------  ICU Admission Date: 3/25/24  Transfer Date: 03-27-24 @ 16:25    Admission Diagnosis: 7-10 displaced rib fractures    Active Problems/injuries: rib fractures and hyponatremia    Procedures: None    Consultants:  [ ] Cardiology  [ ] Endocrine  [ ] Infectious Disease  [ ] Medicine  [ ]Neurosurgery  [ ] Ortho       [ ] Weight Bearing Status:  [ ] Palliative       [ ] Advanced Directives:    [ ] Physical Medicine and Rehab       [ ] Disposition :   [ ] Plastics  [ ] Pulmonary    Medications  apixaban 2.5 milliGRAM(s) Oral every 12 hours  atorvastatin 20 milliGRAM(s) Oral at bedtime  chlorhexidine 2% Cloths 1 Application(s) Topical daily  DULoxetine 30 milliGRAM(s) Oral daily  finasteride 5 milliGRAM(s) Oral daily  gabapentin 100 milliGRAM(s) Oral three times a day  influenza  Vaccine (HIGH DOSE) 0.7 milliLiter(s) IntraMuscular once  levothyroxine 25 MICROGram(s) Oral daily  lidocaine   4% Patch 3 Patch Transdermal every 24 hours  melatonin 3 milliGRAM(s) Oral at bedtime  metoprolol tartrate 25 milliGRAM(s) Oral two times a day  nicotine - 21 mG/24Hr(s) Patch 1 Patch Transdermal daily  oxyCODONE    IR 2.5 milliGRAM(s) Oral every 4 hours PRN  oxyCODONE    IR 5 milliGRAM(s) Oral every 4 hours PRN  polyethylene glycol 3350 17 Gram(s) Oral daily  sucralfate 1 Gram(s) Oral two times a day  tamsulosin 0.4 milliGRAM(s) Oral at bedtime  trimethoprim  160 mG/sulfamethoxazole 800 mG 1 Tablet(s) Oral every 24 hours      [X] I attest I have reviewed and reconciled all medications prior to transfer    Antibiotics:  trimethoprim  160 mG/sulfamethoxazole 800 mG 1 Tablet(s) Oral every 24 hours    Indication: UTI End Date: 4/1/24      I have discussed this case with ACS/Trauma team upon transfer and all questions regarding ICU course were answered.  The following items are to be followed up:  1. Rib fractures - pic protocol, multi-modal pain regimen, OOB, encourage ambulation. ***OPIATE ARM OF RIB FRACTURE STUDY*** used 1 dose of dilaudid 0.2 mg so far on 3/26.  2. Hyponatremia - chronic component, urine studies consistent with SIADH. Poor eater at home with baseline Na around 135. No fluid restriction added as Na stable at 133.  3. UTI - UA positive with frequency and urgency. Continue Bactrim x5 days  4. PT - home w/ RW and home PT  5. Tertiary exam completed 3/26  6. Geriatrics consult completed 3/26  7. SBIRT completed 3/26  8. No PTSD required

## 2024-03-27 NOTE — CHART NOTE - NSCHARTNOTEFT_GEN_A_CORE
SICU DOWNGRADE NOTE    78y Male transferred to floor from SICU    SUBJECTIVE:      -------------------------------------------------------------------------------------------  PMHx/PSHx: PAST MEDICAL & SURGICAL HISTORY:  Hypothyroidism      Benign essential HTN      BPH (benign prostatic hyperplasia)      Smokes tobacco daily      Alcohol use disorder, mild, abuse      S/P CABG (coronary artery bypass graft)      Coronary stent patent        Allergies: No Known Allergies    -------------------------------------------------------------------------------------------    OBJECTIVE:  -------------------------------------------------------------------------------------------  Vitals:  T(C): 36.6 (03-27-24 @ 16:22), Max: 37.1 (03-26-24 @ 23:39)  HR: 60 (03-27-24 @ 16:00) (52 - 78)  BP: 151/81 (03-27-24 @ 16:00) (107/75 - 159/107)  RR: 15 (03-27-24 @ 16:00) (10 - 27)  SpO2: 97% (03-27-24 @ 16:00) (90% - 100%)  Wt(kg): --    -------------------------------------------------------------------------------------------  I&O's Summary    26 Mar 2024 07:01  -  27 Mar 2024 07:00  --------------------------------------------------------  IN: 2400 mL / OUT: 1700 mL / NET: 700 mL    27 Mar 2024 07:01  -  27 Mar 2024 17:51  --------------------------------------------------------  IN: 0 mL / OUT: 1350 mL / NET: -1350 mL      -------------------------------------------------------------------------------------------  Labs:  Labs:  CAPILLARY BLOOD GLUCOSE                              9.5    9.99  )-----------( 290      ( 27 Mar 2024 03:00 )             28.1         03-27    133<L>  |  100  |  17.6  ----------------------------<  113<H>  4.9   |  23.0  |  1.18      Calcium: 9.2 mg/dL (03-27-24 @ 15:18)      LFTs:             6.3  | 0.3  | 13       ------------------[55      ( 26 Mar 2024 04:28 )  3.4  | x    | 8           Lipase:x      Amylase:x             Coags:     17.7   ----< 1.62    ( 25 Mar 2024 20:47 )     33.5                Urinalysis Basic - ( 27 Mar 2024 15:18 )    Color: x / Appearance: x / SG: x / pH: x  Gluc: 113 mg/dL / Ketone: x  / Bili: x / Urobili: x   Blood: x / Protein: x / Nitrite: x   Leuk Esterase: x / RBC: x / WBC x   Sq Epi: x / Non Sq Epi: x / Bacteria: x          -------------------------------------------------------------------------------------------  Active Medications:  MEDICATIONS  (STANDING):  apixaban 2.5 milliGRAM(s) Oral every 12 hours  atorvastatin 20 milliGRAM(s) Oral at bedtime  chlorhexidine 2% Cloths 1 Application(s) Topical daily  DULoxetine 30 milliGRAM(s) Oral daily  finasteride 5 milliGRAM(s) Oral daily  gabapentin 100 milliGRAM(s) Oral three times a day  influenza  Vaccine (HIGH DOSE) 0.7 milliLiter(s) IntraMuscular once  levothyroxine 25 MICROGram(s) Oral daily  lidocaine   4% Patch 3 Patch Transdermal every 24 hours  melatonin 3 milliGRAM(s) Oral at bedtime  metoprolol tartrate 25 milliGRAM(s) Oral two times a day  nicotine - 21 mG/24Hr(s) Patch 1 Patch Transdermal daily  polyethylene glycol 3350 17 Gram(s) Oral daily  sucralfate 1 Gram(s) Oral two times a day  tamsulosin 0.4 milliGRAM(s) Oral at bedtime  trimethoprim  160 mG/sulfamethoxazole 800 mG 1 Tablet(s) Oral every 24 hours    MEDICATIONS  (PRN):  oxyCODONE    IR 2.5 milliGRAM(s) Oral every 4 hours PRN Moderate Pain (4 - 6)  oxyCODONE    IR 5 milliGRAM(s) Oral every 4 hours PRN Severe Pain (7 - 10)    -------------------------------------------------------------------------------------------  SIGN OUT AT 03-27-24 @ 17:51 GIVEN TO: SICU DOWNGRADE NOTE    78y Male transferred to floor from SICU    SUBJECTIVE:      -------------------------------------------------------------------------------------------  PMHx/PSHx: PAST MEDICAL & SURGICAL HISTORY:  Hypothyroidism      Benign essential HTN      BPH (benign prostatic hyperplasia)      Smokes tobacco daily      Alcohol use disorder, mild, abuse      S/P CABG (coronary artery bypass graft)      Coronary stent patent        Allergies: No Known Allergies    -------------------------------------------------------------------------------------------    OBJECTIVE:  -------------------------------------------------------------------------------------------  Vitals:  T(C): 36.6 (03-27-24 @ 16:22), Max: 37.1 (03-26-24 @ 23:39)  HR: 60 (03-27-24 @ 16:00) (52 - 78)  BP: 151/81 (03-27-24 @ 16:00) (107/75 - 159/107)  RR: 15 (03-27-24 @ 16:00) (10 - 27)  SpO2: 97% (03-27-24 @ 16:00) (90% - 100%)  Wt(kg): --    -------------------------------------------------------------------------------------------  I&O's Summary    26 Mar 2024 07:01  -  27 Mar 2024 07:00  --------------------------------------------------------  IN: 2400 mL / OUT: 1700 mL / NET: 700 mL    27 Mar 2024 07:01  -  27 Mar 2024 17:51  --------------------------------------------------------  IN: 0 mL / OUT: 1350 mL / NET: -1350 mL      -------------------------------------------------------------------------------------------  Labs:  Labs:  CAPILLARY BLOOD GLUCOSE                              9.5    9.99  )-----------( 290      ( 27 Mar 2024 03:00 )             28.1         03-27    133<L>  |  100  |  17.6  ----------------------------<  113<H>  4.9   |  23.0  |  1.18      Calcium: 9.2 mg/dL (03-27-24 @ 15:18)      LFTs:             6.3  | 0.3  | 13       ------------------[55      ( 26 Mar 2024 04:28 )  3.4  | x    | 8           Lipase:x      Amylase:x             Coags:     17.7   ----< 1.62    ( 25 Mar 2024 20:47 )     33.5                Urinalysis Basic - ( 27 Mar 2024 15:18 )    Color: x / Appearance: x / SG: x / pH: x  Gluc: 113 mg/dL / Ketone: x  / Bili: x / Urobili: x   Blood: x / Protein: x / Nitrite: x   Leuk Esterase: x / RBC: x / WBC x   Sq Epi: x / Non Sq Epi: x / Bacteria: x          -------------------------------------------------------------------------------------------  Active Medications:  MEDICATIONS  (STANDING):  apixaban 2.5 milliGRAM(s) Oral every 12 hours  atorvastatin 20 milliGRAM(s) Oral at bedtime  chlorhexidine 2% Cloths 1 Application(s) Topical daily  DULoxetine 30 milliGRAM(s) Oral daily  finasteride 5 milliGRAM(s) Oral daily  gabapentin 100 milliGRAM(s) Oral three times a day  influenza  Vaccine (HIGH DOSE) 0.7 milliLiter(s) IntraMuscular once  levothyroxine 25 MICROGram(s) Oral daily  lidocaine   4% Patch 3 Patch Transdermal every 24 hours  melatonin 3 milliGRAM(s) Oral at bedtime  metoprolol tartrate 25 milliGRAM(s) Oral two times a day  nicotine - 21 mG/24Hr(s) Patch 1 Patch Transdermal daily  polyethylene glycol 3350 17 Gram(s) Oral daily  sucralfate 1 Gram(s) Oral two times a day  tamsulosin 0.4 milliGRAM(s) Oral at bedtime  trimethoprim  160 mG/sulfamethoxazole 800 mG 1 Tablet(s) Oral every 24 hours    MEDICATIONS  (PRN):  oxyCODONE    IR 2.5 milliGRAM(s) Oral every 4 hours PRN Moderate Pain (4 - 6)  oxyCODONE    IR 5 milliGRAM(s) Oral every 4 hours PRN Severe Pain (7 - 10)      ASSESSMENT  77 yo M being managed for rib fxs, chronic hyponatremia, and UTI, downgraded from ICU today.  Currently stable    PLAN  - Bactrim x 5d for UTI  - For rib fxs: pic protocol, multi-modal pain regimen, OOB, encourage ambulation. ***OPIATE ARM OF RIB FRACTURE STUDY***  - PT rec home w/ RW and home PT  - Trend Na, SICU team decided to not fluid restrict due to apparent chronicity  - Dispo: possible DC tomorrow if continues to do well      -------------------------------------------------------------------------------------------  SIGN OUT AT 03-27-24 @ 17:51 Received from James Waldrop SICU DOWNGRADE NOTE    78y Male transferred to floor from SICU    SUBJECTIVE:  Feeling well.  Only complaint is L lateral chest wall pain that is well controlled on current pain regimen.  No difficulty with deep breathing but some difficulty attempting to clear phlegm due to pain.  Some difficulty getting out of bed but no difficulty ambulating.  Denies fevers, chills, dysuria, or urgency.    -------------------------------------------------------------------------------------------  PMHx/PSHx: PAST MEDICAL & SURGICAL HISTORY:  Hypothyroidism      Benign essential HTN      BPH (benign prostatic hyperplasia)      Smokes tobacco daily      Alcohol use disorder, mild, abuse      S/P CABG (coronary artery bypass graft)      Coronary stent patent        Allergies: No Known Allergies    -------------------------------------------------------------------------------------------    OBJECTIVE:  -------------------------------------------------------------------------------------------  Vitals:  T(C): 36.6 (03-27-24 @ 16:22), Max: 37.1 (03-26-24 @ 23:39)  HR: 60 (03-27-24 @ 16:00) (52 - 78)  BP: 151/81 (03-27-24 @ 16:00) (107/75 - 159/107)  RR: 15 (03-27-24 @ 16:00) (10 - 27)  SpO2: 97% (03-27-24 @ 16:00) (90% - 100%)  Wt(kg): --     PHYSICAL EXAM:      Constitutional: Sitting in bed, comfortable    Eyes: EOMI    ENMT: Moist mucous membranes     Neck: Nontender c-spine    Respiratory: Regular respiratory effort    Cardiovascular: RRR    Gastrointestinal: Large easily reducible epigastric hernia.  No skin changes.  Rest of abdomen is soft, nontender, nondistended.    Genitourinary: Denies dysuria, denies urgency    Extremities: Moves all extremities    Neurological: No gross defecits    Musculoskeletal: L lateral chest wall tenderness    Psychiatric: Normal affect       -------------------------------------------------------------------------------------------  I&O's Summary    26 Mar 2024 07:01  -  27 Mar 2024 07:00  --------------------------------------------------------  IN: 2400 mL / OUT: 1700 mL / NET: 700 mL    27 Mar 2024 07:01  -  27 Mar 2024 17:51  --------------------------------------------------------  IN: 0 mL / OUT: 1350 mL / NET: -1350 mL      -------------------------------------------------------------------------------------------  Labs:  Labs:  CAPILLARY BLOOD GLUCOSE                              9.5    9.99  )-----------( 290      ( 27 Mar 2024 03:00 )             28.1         03-27    133<L>  |  100  |  17.6  ----------------------------<  113<H>  4.9   |  23.0  |  1.18      Calcium: 9.2 mg/dL (03-27-24 @ 15:18)      LFTs:             6.3  | 0.3  | 13       ------------------[55      ( 26 Mar 2024 04:28 )  3.4  | x    | 8           Lipase:x      Amylase:x             Coags:     17.7   ----< 1.62    ( 25 Mar 2024 20:47 )     33.5                Urinalysis Basic - ( 27 Mar 2024 15:18 )    Color: x / Appearance: x / SG: x / pH: x  Gluc: 113 mg/dL / Ketone: x  / Bili: x / Urobili: x   Blood: x / Protein: x / Nitrite: x   Leuk Esterase: x / RBC: x / WBC x   Sq Epi: x / Non Sq Epi: x / Bacteria: x          -------------------------------------------------------------------------------------------  Active Medications:  MEDICATIONS  (STANDING):  apixaban 2.5 milliGRAM(s) Oral every 12 hours  atorvastatin 20 milliGRAM(s) Oral at bedtime  chlorhexidine 2% Cloths 1 Application(s) Topical daily  DULoxetine 30 milliGRAM(s) Oral daily  finasteride 5 milliGRAM(s) Oral daily  gabapentin 100 milliGRAM(s) Oral three times a day  influenza  Vaccine (HIGH DOSE) 0.7 milliLiter(s) IntraMuscular once  levothyroxine 25 MICROGram(s) Oral daily  lidocaine   4% Patch 3 Patch Transdermal every 24 hours  melatonin 3 milliGRAM(s) Oral at bedtime  metoprolol tartrate 25 milliGRAM(s) Oral two times a day  nicotine - 21 mG/24Hr(s) Patch 1 Patch Transdermal daily  polyethylene glycol 3350 17 Gram(s) Oral daily  sucralfate 1 Gram(s) Oral two times a day  tamsulosin 0.4 milliGRAM(s) Oral at bedtime  trimethoprim  160 mG/sulfamethoxazole 800 mG 1 Tablet(s) Oral every 24 hours    MEDICATIONS  (PRN):  oxyCODONE    IR 2.5 milliGRAM(s) Oral every 4 hours PRN Moderate Pain (4 - 6)  oxyCODONE    IR 5 milliGRAM(s) Oral every 4 hours PRN Severe Pain (7 - 10)      ASSESSMENT  79 yo M being managed for rib fxs, chronic hyponatremia, and UTI, downgraded from ICU today.  Currently stable    PLAN  - Bactrim x 5d for UTI  - For rib fxs: pic protocol, multi-modal pain regimen, OOB, encourage ambulation. ***OPIATE ARM OF RIB FRACTURE STUDY***  - PT rec home w/ RW and home PT  - Trend Na, SICU team decided to not fluid restrict due to apparent chronicity  - Dispo: Likely DC tomorrow if continues to do well      -------------------------------------------------------------------------------------------  SIGN OUT AT 03-27-24 @ 17:51 Received from James Waldrop

## 2024-03-27 NOTE — PROGRESS NOTE ADULT - CRITICAL CARE ATTENDING COMMENT
x x  PIC 9      -Breathing comfortably on RA  -H/H, scds, sqh, will transition to SIADH  -    -Ongoing NAGMA of unclear etiology, UA as part of work-up of hyponatremia suggests UTI.  Patient reports recent urinary frequency/urgency.  Will treat w/macrobid  -Synthroid Feeling much better.  Already out of bed this am.  PIC 9    GEN:  Awake, comfortable  NEURO:  Moves all four extremities without deficit  CVS:  RRR  PUL:  Clear b/l, tender along lateral left chest wall, lidoderm patch in place  ABD:  Soft, non tender, non distended  EXT:  Warm, no edema    Fall  Rib fractures (7-10) displaced  hyponatremia    -Pain controlled w/multimodal pain regimen including narcotics  -Breathing comfortably on RA, CXR w/o new effusion  -HD stable  -H/H, scds, sqh, will transition to SIADH  -Diet as tolerated, bowel regimen  -Voiding, Cr stable (at baseline)  -Afebrile, WBC normal  -Ongoing NAGMA of unclear etiology, UA as part of work-up of hyponatremia suggests UTI.  Patient reports recent urinary frequency/urgency.  Will treat w/course of bactrim, repeat chemistry after gentle hydration later today  -Synthroid  -PIV    Will repeat chemistry after hydration.  If NAGMA resolves -will plan for transfer from critical care setting.

## 2024-03-27 NOTE — DISCHARGE NOTE PROVIDER - NSFOLLOWUPCLINICS_GEN_ALL_ED_FT
[facility] Acute Care Surgery  Acute Care Surgery  [address] [place] [id number]  Phone: [phone]  Fax:

## 2024-03-27 NOTE — PROGRESS NOTE ADULT - SUBJECTIVE AND OBJECTIVE BOX
24H events: Pt had a small bump in his creatinine that is now down trending. PIC scores have been between 8-9.    PAST MEDICAL & SURGICAL HISTORY:  Hypothyroidism      Benign essential HTN      BPH (benign prostatic hyperplasia)      Smokes tobacco daily      Alcohol use disorder, mild, abuse      S/P CABG (coronary artery bypass graft)      Coronary stent patent          MEDICATIONS  (STANDING):  atorvastatin 20 milliGRAM(s) Oral at bedtime  chlorhexidine 2% Cloths 1 Application(s) Topical daily  DULoxetine 30 milliGRAM(s) Oral daily  finasteride 5 milliGRAM(s) Oral daily  gabapentin 100 milliGRAM(s) Oral three times a day  heparin   Injectable 5000 Unit(s) SubCutaneous every 8 hours  influenza  Vaccine (HIGH DOSE) 0.7 milliLiter(s) IntraMuscular once  levothyroxine 25 MICROGram(s) Oral daily  lidocaine   4% Patch 3 Patch Transdermal every 24 hours  melatonin 3 milliGRAM(s) Oral at bedtime  metoprolol tartrate 25 milliGRAM(s) Oral two times a day  nicotine - 21 mG/24Hr(s) Patch 1 Patch Transdermal daily  polyethylene glycol 3350 17 Gram(s) Oral daily  sucralfate 1 Gram(s) Oral two times a day  tamsulosin 0.4 milliGRAM(s) Oral at bedtime    MEDICATIONS  (PRN):  oxyCODONE    IR 2.5 milliGRAM(s) Oral every 4 hours PRN Moderate Pain (4 - 6)  oxyCODONE    IR 5 milliGRAM(s) Oral every 4 hours PRN Severe Pain (7 - 10)      ICU Vital Signs Last 24 Hrs  T(C): 37.1 (26 Mar 2024 23:39), Max: 37.1 (26 Mar 2024 08:26)  T(F): 98.7 (26 Mar 2024 23:39), Max: 98.7 (26 Mar 2024 08:26)  HR: 58 (27 Mar 2024 03:00) (50 - 78)  BP: 131/73 (27 Mar 2024 03:00) (104/81 - 155/91)  BP(mean): 89 (27 Mar 2024 03:00) (73 - 114)  ABP: --  ABP(mean): --  RR: 15 (27 Mar 2024 03:00) (10 - 24)  SpO2: 96% (27 Mar 2024 03:00) (90% - 99%)    O2 Parameters below as of 27 Mar 2024 03:00  Patient On (Oxygen Delivery Method): room air            Drug Dosing Weight  Height (cm): 172.7 (25 Mar 2024 15:48)  Weight (kg): 55.9 (26 Mar 2024 01:00)  BMI (kg/m2): 18.7 (26 Mar 2024 01:00)  BSA (m2): 1.66 (26 Mar 2024 01:00)    CENTRAL LINE: [ ] YES [x ] NO  LOCATION:   DATE INSERTED:  REMOVE: [ ] YES [ ] NO  EXPLAIN:    LOVE: [ ] YES [x ] NO    DATE INSERTED:  REMOVE: [ ] YES [ ] NO  EXPLAIN:    A-LINE: [ ] YES [ ] NO  LOCATION:   DATE INSERTED:  REMOVE: [ ] YES x[ ] NO  EXPLAIN:                  I&O's Detail    25 Mar 2024 07:01  -  26 Mar 2024 07:00  --------------------------------------------------------  IN:    IV PiggyBack: 50 mL    sodium chloride 0.9%: 900 mL    Sodium Chloride 0.9% Bolus: 500 mL  Total IN: 1450 mL    OUT:    Voided (mL): 1000 mL  Total OUT: 1000 mL    Total NET: 450 mL      26 Mar 2024 07:01  -  27 Mar 2024 04:00  --------------------------------------------------------  IN:    sodium chloride 0.9%: 1000 mL    Sodium Chloride 0.9% Bolus: 1000 mL  Total IN: 2000 mL    OUT:    Voided (mL): 1600 mL  Total OUT: 1600 mL    Total NET: 400 mL          Gen: Resting comfortably in bed     HEENT: PERRL, EOMI     Neurological: Alert and oriented x3 without focal deficit. no acute distress      Neck: Trachea midline, no evidence of JVD     Pulmonary: CTA B/L, equal rise and fall of the chest, left side chest wall tenderness      Cardiovascular: regular rate and rhythm     Gastrointestinal: Soft, non-tender, non-distended     : voiding      Extremities: Without clubbing/cyanosis/edema     Musculoskeletal: localized pain and discomfort to L chest wall over ribs 7-10. MSK pain worse upon manipulation and inspiration     Skin: Intact              LABS:  CBC Full  -  ( 27 Mar 2024 03:00 )  WBC Count : 9.99 K/uL  RBC Count : 3.36 M/uL  Hemoglobin : 9.5 g/dL  Hematocrit : 28.1 %  Platelet Count - Automated : 290 K/uL  Mean Cell Volume : 83.6 fl  Mean Cell Hemoglobin : 28.3 pg  Mean Cell Hemoglobin Concentration : 33.8 gm/dL  03-27    133<L>  |  101  |  14.8  ----------------------------<  103<H>  4.7   |  18.0<L>  |  1.19    Ca    8.9      27 Mar 2024 03:00  Phos  2.6     03-27  Mg     1.9     03-27    TPro  6.3<L>  /  Alb  3.4  /  TBili  0.3<L>  /  DBili  x   /  AST  13  /  ALT  8   /  AlkPhos  55  03-26    PT/INR - ( 25 Mar 2024 20:47 )   PT: 17.7 sec;   INR: 1.62 ratio         PTT - ( 25 Mar 2024 20:47 )  PTT:33.5 sec

## 2024-03-28 LAB
ANION GAP SERPL CALC-SCNC: 10 MMOL/L — SIGNIFICANT CHANGE UP (ref 5–17)
ANION GAP SERPL CALC-SCNC: 10 MMOL/L — SIGNIFICANT CHANGE UP (ref 5–17)
ANION GAP SERPL CALC-SCNC: 14 MMOL/L — SIGNIFICANT CHANGE UP (ref 5–17)
BUN SERPL-MCNC: 21.6 MG/DL — HIGH (ref 8–20)
BUN SERPL-MCNC: 23 MG/DL — HIGH (ref 8–20)
BUN SERPL-MCNC: 25 MG/DL — HIGH (ref 8–20)
CALCIUM SERPL-MCNC: 8.6 MG/DL — SIGNIFICANT CHANGE UP (ref 8.4–10.5)
CALCIUM SERPL-MCNC: 8.9 MG/DL — SIGNIFICANT CHANGE UP (ref 8.4–10.5)
CALCIUM SERPL-MCNC: 9.1 MG/DL — SIGNIFICANT CHANGE UP (ref 8.4–10.5)
CHLORIDE SERPL-SCNC: 100 MMOL/L — SIGNIFICANT CHANGE UP (ref 96–108)
CHLORIDE SERPL-SCNC: 96 MMOL/L — SIGNIFICANT CHANGE UP (ref 96–108)
CHLORIDE SERPL-SCNC: 99 MMOL/L — SIGNIFICANT CHANGE UP (ref 96–108)
CO2 SERPL-SCNC: 21 MMOL/L — LOW (ref 22–29)
CO2 SERPL-SCNC: 22 MMOL/L — SIGNIFICANT CHANGE UP (ref 22–29)
CO2 SERPL-SCNC: 22 MMOL/L — SIGNIFICANT CHANGE UP (ref 22–29)
CREAT SERPL-MCNC: 1.55 MG/DL — HIGH (ref 0.5–1.3)
CREAT SERPL-MCNC: 1.67 MG/DL — HIGH (ref 0.5–1.3)
CREAT SERPL-MCNC: 1.8 MG/DL — HIGH (ref 0.5–1.3)
EGFR: 39 ML/MIN/1.73M2 — LOW
EGFR: 42 ML/MIN/1.73M2 — LOW
EGFR: 46 ML/MIN/1.73M2 — LOW
GLUCOSE SERPL-MCNC: 100 MG/DL — HIGH (ref 70–99)
GLUCOSE SERPL-MCNC: 100 MG/DL — HIGH (ref 70–99)
GLUCOSE SERPL-MCNC: 102 MG/DL — HIGH (ref 70–99)
HCT VFR BLD CALC: 28.2 % — LOW (ref 39–50)
HGB BLD-MCNC: 9.2 G/DL — LOW (ref 13–17)
MAGNESIUM SERPL-MCNC: 1.8 MG/DL — SIGNIFICANT CHANGE UP (ref 1.6–2.6)
MAGNESIUM SERPL-MCNC: 2.1 MG/DL — SIGNIFICANT CHANGE UP (ref 1.8–2.6)
MCHC RBC-ENTMCNC: 27.6 PG — SIGNIFICANT CHANGE UP (ref 27–34)
MCHC RBC-ENTMCNC: 32.6 GM/DL — SIGNIFICANT CHANGE UP (ref 32–36)
MCV RBC AUTO: 84.7 FL — SIGNIFICANT CHANGE UP (ref 80–100)
PHOSPHATE SERPL-MCNC: 3.6 MG/DL — SIGNIFICANT CHANGE UP (ref 2.4–4.7)
PHOSPHATE SERPL-MCNC: 4.4 MG/DL — SIGNIFICANT CHANGE UP (ref 2.4–4.7)
PLATELET # BLD AUTO: 277 K/UL — SIGNIFICANT CHANGE UP (ref 150–400)
POTASSIUM SERPL-MCNC: 4.5 MMOL/L — SIGNIFICANT CHANGE UP (ref 3.5–5.3)
POTASSIUM SERPL-MCNC: 4.5 MMOL/L — SIGNIFICANT CHANGE UP (ref 3.5–5.3)
POTASSIUM SERPL-MCNC: 4.8 MMOL/L — SIGNIFICANT CHANGE UP (ref 3.5–5.3)
POTASSIUM SERPL-SCNC: 4.5 MMOL/L — SIGNIFICANT CHANGE UP (ref 3.5–5.3)
POTASSIUM SERPL-SCNC: 4.5 MMOL/L — SIGNIFICANT CHANGE UP (ref 3.5–5.3)
POTASSIUM SERPL-SCNC: 4.8 MMOL/L — SIGNIFICANT CHANGE UP (ref 3.5–5.3)
RBC # BLD: 3.33 M/UL — LOW (ref 4.2–5.8)
RBC # FLD: 14.9 % — HIGH (ref 10.3–14.5)
SODIUM SERPL-SCNC: 131 MMOL/L — LOW (ref 135–145)
SODIUM SERPL-SCNC: 131 MMOL/L — LOW (ref 135–145)
SODIUM SERPL-SCNC: 132 MMOL/L — LOW (ref 135–145)
WBC # BLD: 9.06 K/UL — SIGNIFICANT CHANGE UP (ref 3.8–10.5)
WBC # FLD AUTO: 9.06 K/UL — SIGNIFICANT CHANGE UP (ref 3.8–10.5)

## 2024-03-28 RX ORDER — MAGNESIUM SULFATE 500 MG/ML
2 VIAL (ML) INJECTION ONCE
Refills: 0 | Status: COMPLETED | OUTPATIENT
Start: 2024-03-28 | End: 2024-03-28

## 2024-03-28 RX ORDER — METHOCARBAMOL 500 MG/1
500 TABLET, FILM COATED ORAL EVERY 8 HOURS
Refills: 0 | Status: DISCONTINUED | OUTPATIENT
Start: 2024-03-28 | End: 2024-04-01

## 2024-03-28 RX ORDER — SODIUM CHLORIDE 9 MG/ML
1000 INJECTION INTRAMUSCULAR; INTRAVENOUS; SUBCUTANEOUS ONCE
Refills: 0 | Status: COMPLETED | OUTPATIENT
Start: 2024-03-28 | End: 2024-03-28

## 2024-03-28 RX ADMIN — TAMSULOSIN HYDROCHLORIDE 0.4 MILLIGRAM(S): 0.4 CAPSULE ORAL at 22:02

## 2024-03-28 RX ADMIN — SODIUM CHLORIDE 500 MILLILITER(S): 9 INJECTION INTRAMUSCULAR; INTRAVENOUS; SUBCUTANEOUS at 07:57

## 2024-03-28 RX ADMIN — Medication 975 MILLIGRAM(S): at 12:14

## 2024-03-28 RX ADMIN — GABAPENTIN 100 MILLIGRAM(S): 400 CAPSULE ORAL at 13:36

## 2024-03-28 RX ADMIN — Medication 15 MILLIGRAM(S): at 06:52

## 2024-03-28 RX ADMIN — Medication 1 GRAM(S): at 17:08

## 2024-03-28 RX ADMIN — Medication 975 MILLIGRAM(S): at 17:44

## 2024-03-28 RX ADMIN — FINASTERIDE 5 MILLIGRAM(S): 5 TABLET, FILM COATED ORAL at 11:14

## 2024-03-28 RX ADMIN — SODIUM CHLORIDE 1000 MILLILITER(S): 9 INJECTION INTRAMUSCULAR; INTRAVENOUS; SUBCUTANEOUS at 13:50

## 2024-03-28 RX ADMIN — Medication 1 TABLET(S): at 08:47

## 2024-03-28 RX ADMIN — Medication 25 MILLIGRAM(S): at 06:03

## 2024-03-28 RX ADMIN — DULOXETINE HYDROCHLORIDE 30 MILLIGRAM(S): 30 CAPSULE, DELAYED RELEASE ORAL at 11:14

## 2024-03-28 RX ADMIN — SENNA PLUS 2 TABLET(S): 8.6 TABLET ORAL at 22:03

## 2024-03-28 RX ADMIN — Medication 1 GRAM(S): at 06:04

## 2024-03-28 RX ADMIN — GABAPENTIN 100 MILLIGRAM(S): 400 CAPSULE ORAL at 22:02

## 2024-03-28 RX ADMIN — Medication 25 GRAM(S): at 10:40

## 2024-03-28 RX ADMIN — Medication 25 MICROGRAM(S): at 06:52

## 2024-03-28 RX ADMIN — Medication 3 MILLIGRAM(S): at 22:02

## 2024-03-28 RX ADMIN — POLYETHYLENE GLYCOL 3350 17 GRAM(S): 17 POWDER, FOR SOLUTION ORAL at 11:15

## 2024-03-28 RX ADMIN — APIXABAN 2.5 MILLIGRAM(S): 2.5 TABLET, FILM COATED ORAL at 17:08

## 2024-03-28 RX ADMIN — Medication 975 MILLIGRAM(S): at 06:03

## 2024-03-28 RX ADMIN — Medication 1000 MILLIGRAM(S): at 00:07

## 2024-03-28 RX ADMIN — Medication 975 MILLIGRAM(S): at 22:19

## 2024-03-28 RX ADMIN — Medication 15 MILLIGRAM(S): at 00:07

## 2024-03-28 RX ADMIN — APIXABAN 2.5 MILLIGRAM(S): 2.5 TABLET, FILM COATED ORAL at 06:03

## 2024-03-28 RX ADMIN — Medication 975 MILLIGRAM(S): at 06:52

## 2024-03-28 RX ADMIN — Medication 975 MILLIGRAM(S): at 22:49

## 2024-03-28 RX ADMIN — GABAPENTIN 100 MILLIGRAM(S): 400 CAPSULE ORAL at 06:04

## 2024-03-28 RX ADMIN — Medication 975 MILLIGRAM(S): at 11:14

## 2024-03-28 RX ADMIN — Medication 975 MILLIGRAM(S): at 17:08

## 2024-03-28 RX ADMIN — ATORVASTATIN CALCIUM 20 MILLIGRAM(S): 80 TABLET, FILM COATED ORAL at 22:03

## 2024-03-28 RX ADMIN — Medication 15 MILLIGRAM(S): at 06:04

## 2024-03-28 NOTE — PROGRESS NOTE ADULT - ATTENDING COMMENTS
I have seen and examined the patient during rounds form [id number] hrs  events noted    Pain under better control  Multimodality analgesia  Creatinine rising,  hydrate, avoid nephrotoxic drugs  DVt chemoprophylaxes  DVt chmeoprhyas'  PT  Dispo planning

## 2024-03-28 NOTE — PROGRESS NOTE ADULT - SUBJECTIVE AND OBJECTIVE BOX
SCOTT AWRE    [id number]    78y      Male    Patient is a 78y old  Male who presents with a chief complaint of Rib fracture (27 Mar 2024 17:53)      INTERVAL HPI/OVERNIGHT EVENTS:    Patient is doing ok, denies fever, chills, chest pain, sob, dizziness       Vital Signs Last 24 Hrs  T(C): 36.6 (28 Mar 2024 08:41), Max: 36.9 (27 Mar 2024 10:59)  T(F): 97.9 (28 Mar 2024 08:41), Max: 98.4 (27 Mar 2024 10:59)  HR: 51 (28 Mar 2024 08:41) (51 - 73)  BP: 143/77 (28 Mar 2024 08:41) (105/69 - 152/86)  BP(mean): 106 (27 Mar 2024 17:00) (87 - 111)  RR: 18 (28 Mar 2024 08:41) (14 - 22)  SpO2: 97% (28 Mar 2024 08:41) (93% - 99%)    Parameters below as of 28 Mar 2024 08:41  Patient On (Oxygen Delivery Method): room air        PHYSICAL EXAM:    GENERAL: Elderly male looking comfortable   HEENT: PERRL, +EOMI  NECK: soft, Supple, No JVD   CHEST/LUNG: Decrease air entry bilaterally; No wheezing  HEART: S1S2+, Regular rate and rhythm; No murmurs  ABDOMEN: Soft, Nontender, Nondistended; Bowel sounds present  EXTREMITIES:  1+ Peripheral Pulses, No edema  SKIN: No rashes or lesions  NEURO: AAOX3  PSYCH: normal mood      LABS:                        9.2    9.06  )-----------( 277      ( 28 Mar 2024 06:00 )             28.2     03-28    131<L>  |  96  |  21.6<H>  ----------------------------<  102<H>  4.5   |  21.0<L>  |  1.55<H>    Ca    9.1      28 Mar 2024 06:00  Phos  3.6     03-28  Mg     1.8     03-28          I&O's Summary    27 Mar 2024 07:01  -  28 Mar 2024 07:00  --------------------------------------------------------  IN: 480 mL / OUT: 1350 mL / NET: -870 mL        MEDICATIONS  (STANDING):  acetaminophen     Tablet .. 975 milliGRAM(s) Oral every 6 hours  apixaban 2.5 milliGRAM(s) Oral every 12 hours  atorvastatin 20 milliGRAM(s) Oral at bedtime  DULoxetine 30 milliGRAM(s) Oral daily  finasteride 5 milliGRAM(s) Oral daily  gabapentin 100 milliGRAM(s) Oral three times a day  influenza  Vaccine (HIGH DOSE) 0.7 milliLiter(s) IntraMuscular once  levothyroxine 25 MICROGram(s) Oral daily  lidocaine   4% Patch 3 Patch Transdermal every 24 hours  magnesium sulfate  IVPB 2 Gram(s) IV Intermittent once  melatonin 3 milliGRAM(s) Oral at bedtime  metoprolol tartrate 25 milliGRAM(s) Oral two times a day  nicotine - 21 mG/24Hr(s) Patch 1 Patch Transdermal daily  polyethylene glycol 3350 17 Gram(s) Oral daily  senna 2 Tablet(s) Oral at bedtime  sucralfate 1 Gram(s) Oral two times a day  tamsulosin 0.4 milliGRAM(s) Oral at bedtime  trimethoprim  160 mG/sulfamethoxazole 800 mG 1 Tablet(s) Oral every 24 hours    MEDICATIONS  (PRN):  methocarbamol 500 milliGRAM(s) Oral every 8 hours PRN Muscle Spasm  oxyCODONE    IR 5 milliGRAM(s) Oral every 4 hours PRN Severe Pain (7 - 10)  oxyCODONE    IR 2.5 milliGRAM(s) Oral every 4 hours PRN Moderate Pain (4 - 6)

## 2024-03-28 NOTE — CHART NOTE - NSCHARTNOTEFT_GEN_A_CORE
I spoke with daughter Glenna regarding her concerns that her father's speech was garbled on the phone and with the combination of him having a urinary tract infection she was worried that he was getting as sick as he was the last time he was in the hospital. I went to the bedside, patient awake, alert, jovial, A&OX3, GCS 15 , all VSS, continues to have good saturation on room air, and pain well controlled. I explained that I have no concerns at this time regarding his mental status, but understand her worries considering what she experienced with him in the past. He is till on the bactrim for the UTI for this admission and has remained hemodynamically normal through out his stay. She also had questions regarding his increase in his BUN/ Creatinine, I explained that we are monitoring it closely, we are making sure he is hydrated and not taking any medications that could increase his bun/creatinine. We have labs pending for the morning and based on those results will determine any further steps needed for workup. Daughter was very pleased with conversation, all questions answered.

## 2024-03-28 NOTE — PROGRESS NOTE ADULT - SUBJECTIVE AND OBJECTIVE BOX
SURGERY        INTERVAL EVENTS/SUBJECTIVE:   Downgraded from SICU yesterday, very eager to go home.  Mr. Snyder reports feeling well with no complaints at this time.    ______________________________________________  OBJECTIVE:   T(C): 36.6 (03-28-24 @ 08:41), Max: 36.7 (03-28-24 @ 00:09)  HR: 51 (03-28-24 @ 08:41) (51 - 73)  BP: 143/77 (03-28-24 @ 08:41) (105/69 - 152/86)  RR: 18 (03-28-24 @ 08:41) (14 - 22)  SpO2: 97% (03-28-24 @ 08:41) (93% - 99%)  Wt(kg): --  CAPILLARY BLOOD GLUCOSE        I&O's Detail    27 Mar 2024 07:01  -  28 Mar 2024 07:00  --------------------------------------------------------  IN:    Oral Fluid: 480 mL  Total IN: 480 mL    OUT:    Voided (mL): 1350 mL  Total OUT: 1350 mL    Total NET: -870 mL          Physical exam:  Gen: resting in bed comfortably in NAD  Chest: no increased WOB, regular inspiratory effort, L lateral chest wall TTP  Abdomen: Soft, nontender, nondistended with no rebound tenderness or guarding. Incisions clean, dry, intact, with no erythema.   Vascular: WWP, [place] x4  NEURO: awake, alert  ______________________________________________  LABS:  CBC Full  -  ( 28 Mar 2024 06:00 )  WBC Count : 9.06 K/uL  RBC Count : 3.33 M/uL  Hemoglobin : 9.2 g/dL  Hematocrit : 28.2 %  Platelet Count - Automated : 277 K/uL  Mean Cell Volume : 84.7 fl  Mean Cell Hemoglobin : 27.6 pg  Mean Cell Hemoglobin Concentration : 32.6 gm/dL  Auto Neutrophil # : x  Auto Lymphocyte # : x  Auto Monocyte # : x  Auto Eosinophil # : x  Auto Basophil # : x  Auto Neutrophil % : x  Auto Lymphocyte % : x  Auto Monocyte % : x  Auto Eosinophil % : x  Auto Basophil % : x    03-28    132<L>  |  100  |  23.0<H>  ----------------------------<  100<H>  4.5   |  22.0  |  1.67<H>    Ca    8.9      28 Mar 2024 11:35  Phos  3.6     03-28  Mg     1.8     03-28      _____________________________________________  RADIOLOGY:     SURGERY        INTERVAL EVENTS/SUBJECTIVE:   Downgraded from SICU yesterday, very eager to go home.  Mr. Snyder reports feeling well with no complaints at this time.    ______________________________________________  OBJECTIVE:   T(C): 36.6 (03-28-24 @ 08:41), Max: 36.7 (03-28-24 @ 00:09)  HR: 51 (03-28-24 @ 08:41) (51 - 73)  BP: 143/77 (03-28-24 @ 08:41) (105/69 - 152/86)  RR: 18 (03-28-24 @ 08:41) (14 - 22)  SpO2: 97% (03-28-24 @ 08:41) (93% - 99%)  Wt(kg): --  CAPILLARY BLOOD GLUCOSE        I&O's Detail    27 Mar 2024 07:01  -  28 Mar 2024 07:00  --------------------------------------------------------  IN:    Oral Fluid: 480 mL  Total IN: 480 mL    OUT:    Voided (mL): 1350 mL  Total OUT: 1350 mL    Total NET: -870 mL          Physical exam:  Gen: resting in bed comfortably in NAD  Chest: no increased WOB, regular inspiratory effort, L lateral chest wall TTP  Abdomen: Soft, nontender, nondistended with no rebound tenderness or guarding. Large easily reducible epigastric hernia  Vascular: DENISE, CL x4  NEURO: awake, alert  ______________________________________________  LABS:  CBC Full  -  ( 28 Mar 2024 06:00 )  WBC Count : 9.06 K/uL  RBC Count : 3.33 M/uL  Hemoglobin : 9.2 g/dL  Hematocrit : 28.2 %  Platelet Count - Automated : 277 K/uL  Mean Cell Volume : 84.7 fl  Mean Cell Hemoglobin : 27.6 pg  Mean Cell Hemoglobin Concentration : 32.6 gm/dL  Auto Neutrophil # : x  Auto Lymphocyte # : x  Auto Monocyte # : x  Auto Eosinophil # : x  Auto Basophil # : x  Auto Neutrophil % : x  Auto Lymphocyte % : x  Auto Monocyte % : x  Auto Eosinophil % : x  Auto Basophil % : x    03-28    132<L>  |  100  |  23.0<H>  ----------------------------<  100<H>  4.5   |  22.0  |  1.67<H>    Ca    8.9      28 Mar 2024 11:35  Phos  3.6     03-28  Mg     1.8     03-28

## 2024-03-29 LAB
ANION GAP SERPL CALC-SCNC: 13 MMOL/L — SIGNIFICANT CHANGE UP (ref 5–17)
BUN SERPL-MCNC: 29.4 MG/DL — HIGH (ref 8–20)
CALCIUM SERPL-MCNC: 8.8 MG/DL — SIGNIFICANT CHANGE UP (ref 8.4–10.5)
CHLORIDE SERPL-SCNC: 101 MMOL/L — SIGNIFICANT CHANGE UP (ref 96–108)
CO2 SERPL-SCNC: 19 MMOL/L — LOW (ref 22–29)
CREAT ?TM UR-MCNC: 27 MG/DL — SIGNIFICANT CHANGE UP
CREAT SERPL-MCNC: 1.78 MG/DL — HIGH (ref 0.5–1.3)
EGFR: 39 ML/MIN/1.73M2 — LOW
GLUCOSE SERPL-MCNC: 92 MG/DL — SIGNIFICANT CHANGE UP (ref 70–99)
MAGNESIUM SERPL-MCNC: 2 MG/DL — SIGNIFICANT CHANGE UP (ref 1.6–2.6)
OSMOLALITY UR: 265 MOSM/KG — LOW (ref 300–1000)
PHOSPHATE SERPL-MCNC: 3.8 MG/DL — SIGNIFICANT CHANGE UP (ref 2.4–4.7)
POTASSIUM SERPL-MCNC: 4.9 MMOL/L — SIGNIFICANT CHANGE UP (ref 3.5–5.3)
POTASSIUM SERPL-SCNC: 4.9 MMOL/L — SIGNIFICANT CHANGE UP (ref 3.5–5.3)
SODIUM SERPL-SCNC: 133 MMOL/L — LOW (ref 135–145)
SODIUM UR-SCNC: 75 MMOL/L — SIGNIFICANT CHANGE UP

## 2024-03-29 RX ORDER — SODIUM CHLORIDE 9 MG/ML
1000 INJECTION INTRAMUSCULAR; INTRAVENOUS; SUBCUTANEOUS
Refills: 0 | Status: DISCONTINUED | OUTPATIENT
Start: 2024-03-29 | End: 2024-03-29

## 2024-03-29 RX ADMIN — FINASTERIDE 5 MILLIGRAM(S): 5 TABLET, FILM COATED ORAL at 11:41

## 2024-03-29 RX ADMIN — APIXABAN 2.5 MILLIGRAM(S): 2.5 TABLET, FILM COATED ORAL at 17:36

## 2024-03-29 RX ADMIN — Medication 1 GRAM(S): at 17:36

## 2024-03-29 RX ADMIN — Medication 25 MICROGRAM(S): at 06:57

## 2024-03-29 RX ADMIN — Medication 975 MILLIGRAM(S): at 11:41

## 2024-03-29 RX ADMIN — ATORVASTATIN CALCIUM 20 MILLIGRAM(S): 80 TABLET, FILM COATED ORAL at 21:26

## 2024-03-29 RX ADMIN — Medication 25 MILLIGRAM(S): at 06:57

## 2024-03-29 RX ADMIN — Medication 25 MILLIGRAM(S): at 17:36

## 2024-03-29 RX ADMIN — SENNA PLUS 2 TABLET(S): 8.6 TABLET ORAL at 21:21

## 2024-03-29 RX ADMIN — SODIUM CHLORIDE 42 MILLILITER(S): 9 INJECTION INTRAMUSCULAR; INTRAVENOUS; SUBCUTANEOUS at 09:58

## 2024-03-29 RX ADMIN — SODIUM CHLORIDE 42 MILLILITER(S): 9 INJECTION INTRAMUSCULAR; INTRAVENOUS; SUBCUTANEOUS at 20:41

## 2024-03-29 RX ADMIN — Medication 975 MILLIGRAM(S): at 20:41

## 2024-03-29 RX ADMIN — APIXABAN 2.5 MILLIGRAM(S): 2.5 TABLET, FILM COATED ORAL at 06:57

## 2024-03-29 RX ADMIN — Medication 975 MILLIGRAM(S): at 12:41

## 2024-03-29 RX ADMIN — GABAPENTIN 100 MILLIGRAM(S): 400 CAPSULE ORAL at 07:01

## 2024-03-29 RX ADMIN — Medication 1 GRAM(S): at 07:38

## 2024-03-29 RX ADMIN — Medication 3 MILLIGRAM(S): at 21:25

## 2024-03-29 RX ADMIN — TAMSULOSIN HYDROCHLORIDE 0.4 MILLIGRAM(S): 0.4 CAPSULE ORAL at 21:21

## 2024-03-29 RX ADMIN — DULOXETINE HYDROCHLORIDE 30 MILLIGRAM(S): 30 CAPSULE, DELAYED RELEASE ORAL at 11:41

## 2024-03-29 RX ADMIN — Medication 975 MILLIGRAM(S): at 06:57

## 2024-03-29 RX ADMIN — Medication 975 MILLIGRAM(S): at 17:36

## 2024-03-29 RX ADMIN — POLYETHYLENE GLYCOL 3350 17 GRAM(S): 17 POWDER, FOR SOLUTION ORAL at 11:41

## 2024-03-29 RX ADMIN — GABAPENTIN 100 MILLIGRAM(S): 400 CAPSULE ORAL at 21:20

## 2024-03-29 RX ADMIN — Medication 1 TABLET(S): at 08:34

## 2024-03-29 RX ADMIN — Medication 975 MILLIGRAM(S): at 07:30

## 2024-03-29 RX ADMIN — GABAPENTIN 100 MILLIGRAM(S): 400 CAPSULE ORAL at 14:11

## 2024-03-29 NOTE — DIETITIAN INITIAL EVALUATION ADULT - PERTINENT LABORATORY DATA
03-29    133<L>  |  101  |  29.4<H>  ----------------------------<  92  4.9   |  19.0<L>  |  1.78<H>    Ca    8.8      29 Mar 2024 06:20  Phos  3.8     03-29  Mg     2.0     03-29    A1C with Estimated Average Glucose Result: 6.0 % (03-26-24 @ 02:50)

## 2024-03-29 NOTE — DIETITIAN INITIAL EVALUATION ADULT - PERTINENT MEDS FT
MEDICATIONS  (STANDING):  atorvastatin 20 milliGRAM(s) Oral at bedtime  levothyroxine 25 MICROGram(s) Oral daily  polyethylene glycol 3350 17 Gram(s) Oral daily  senna 2 Tablet(s) Oral at bedtime  sodium chloride 0.9%. 1000 milliLiter(s) (42 mL/Hr) IV Continuous <Continuous>  sucralfate 1 Gram(s) Oral two times a day

## 2024-03-29 NOTE — PROGRESS NOTE ADULT - SUBJECTIVE AND OBJECTIVE BOX
Subjective: Patient seen and examined at bedside. No overnight events or acute complaints. Admits feeling well. Denies nausea/ vomiting, diarrhea, fever, chills, shortness of breath, chest pain or any other symptoms.         MEDICATIONS  (STANDING):  acetaminophen     Tablet .. 975 milliGRAM(s) Oral every 6 hours  apixaban 2.5 milliGRAM(s) Oral every 12 hours  atorvastatin 20 milliGRAM(s) Oral at bedtime  DULoxetine 30 milliGRAM(s) Oral daily  finasteride 5 milliGRAM(s) Oral daily  gabapentin 100 milliGRAM(s) Oral three times a day  influenza  Vaccine (HIGH DOSE) 0.7 milliLiter(s) IntraMuscular once  levothyroxine 25 MICROGram(s) Oral daily  lidocaine   4% Patch 3 Patch Transdermal every 24 hours  melatonin 3 milliGRAM(s) Oral at bedtime  metoprolol tartrate 25 milliGRAM(s) Oral two times a day  nicotine - 21 mG/24Hr(s) Patch 1 Patch Transdermal daily  polyethylene glycol 3350 17 Gram(s) Oral daily  senna 2 Tablet(s) Oral at bedtime  sodium chloride 0.9%. 1000 milliLiter(s) (42 mL/Hr) IV Continuous <Continuous>  sucralfate 1 Gram(s) Oral two times a day  tamsulosin 0.4 milliGRAM(s) Oral at bedtime  trimethoprim  160 mG/sulfamethoxazole 800 mG 1 Tablet(s) Oral every 24 hours    MEDICATIONS  (PRN):  methocarbamol 500 milliGRAM(s) Oral every 8 hours PRN Muscle Spasm  oxyCODONE    IR 5 milliGRAM(s) Oral every 4 hours PRN Severe Pain (7 - 10)  oxyCODONE    IR 2.5 milliGRAM(s) Oral every 4 hours PRN Moderate Pain (4 - 6)      Vital Signs Last 24 Hrs  T(C): 36.8 (29 Mar 2024 12:02), Max: 37 (29 Mar 2024 09:00)  T(F): 98.3 (29 Mar 2024 12:02), Max: 98.6 (29 Mar 2024 09:00)  HR: 56 (29 Mar 2024 12:02) (56 - 60)  BP: 148/84 (29 Mar 2024 12:02) (107/66 - 148/84)  BP(mean): --  RR: 12 (29 Mar 2024 12:02) (12 - 18)  SpO2: 95% (29 Mar 2024 12:02) (95% - 99%)    Parameters below as of 29 Mar 2024 12:02  Patient On (Oxygen Delivery Method): room air        Physical Exam:    Constitutional: resting comfortably in bed, NAD  Respiratory: Respirations non-labored, no accessory muscle use  Gastrointestinal: Soft, non-tender, non-distended  Extremities: No peripheral edema  Musculoskeletal: no limitation of movement       LABS:                        9.2    9.06  )-----------( 277      ( 28 Mar 2024 06:00 )             28.2     03-29    133<L>  |  101  |  29.4<H>  ----------------------------<  92  4.9   |  19.0<L>  |  1.78<H>    Ca    8.8      29 Mar 2024 06:20  Phos  3.8     03-29  Mg     2.0     03-29        Urinalysis Basic - ( 29 Mar 2024 06:20 )    Color: x / Appearance: x / SG: x / pH: x  Gluc: 92 mg/dL / Ketone: x  / Bili: x / Urobili: x   Blood: x / Protein: x / Nitrite: x   Leuk Esterase: x / RBC: x / WBC x   Sq Epi: x / Non Sq Epi: x / Bacteria: x      A: 77 yo M being managed for rib fxs, chronic hyponatremia, and UTI, downgraded from ICU on 3/27.  Currently stable. Creatinine levels had increased, have remained increased.       Plan:   - f/u US of kidneys read   - For rib fxs: pic protocol, multi-modal pain regimen, OOB, encourage ambulation. ***OPIATE ARM OF RIB FRACTURE STUDY***  - c/w Bactrim for UTI   - reg diet   - NaCl @ 42   - PT rec home w/ RW and home PT  - dispo: pending U/S, Cr and electrolytes

## 2024-03-29 NOTE — DIETITIAN INITIAL EVALUATION ADULT - ORAL INTAKE PTA/DIET HISTORY
Nutrition assessment completed. Pt reports good appetite/po intake prior to admission and currently. Admission weight of 123.2 lbs not accurate- pt reports current weight ~193 lbs, states he gained a few lbs with UBW ~185 lbs. Pt with no nutrition-related questions/concerns at this time. Pt receiving Ensure supplements for additional protein. RD to follow up as feasible.

## 2024-03-29 NOTE — DIETITIAN INITIAL EVALUATION ADULT - NS FNS DIET ORDER
Diet, Regular:   Supplement Feeding Modality:  Oral  Ensure Plus High Protein Cans or Servings Per Day:  1       Frequency:  Three Times a day (03-26-24 @ 22:38)

## 2024-03-29 NOTE — PROGRESS NOTE ADULT - SUBJECTIVE AND OBJECTIVE BOX
SCOTT WARE    [id number]    78y      Male    Patient is a 78y old  Male who presents with a chief complaint of Multiple fractures of ribs, left side, initial encounter for closed fracture     (29 Mar 2024 10:00)      INTERVAL HPI/OVERNIGHT EVENTS:    Patient is doing ok, denies fever, chills, chest pain, sob, dizziness       Vital Signs Last 24 Hrs  T(C): 37 (29 Mar 2024 09:00), Max: 37 (29 Mar 2024 09:00)  T(F): 98.6 (29 Mar 2024 09:00), Max: 98.6 (29 Mar 2024 09:00)  HR: 57 (29 Mar 2024 09:00) (56 - 60)  BP: 120/70 (29 Mar 2024 09:00) (107/66 - 126/69)  RR: 12 (29 Mar 2024 09:00) (12 - 18)  SpO2: 95% (29 Mar 2024 08:01) (95% - 99%)    Parameters below as of 29 Mar 2024 09:00  Patient On (Oxygen Delivery Method): room air        PHYSICAL EXAM:    GENERAL: Elderly male looking comfortable   HEENT: PERRL, +EOMI  NECK: soft, Supple, No JVD   CHEST/LUNG: Decrease air entry bilaterally; No wheezing  HEART: S1S2+, Regular rate and rhythm; No murmurs  ABDOMEN: Soft, Nontender, Nondistended; Bowel sounds present  EXTREMITIES:  1+ Peripheral Pulses, No edema  SKIN: No rashes or lesions  NEURO: AAOX3  PSYCH: normal mood      LABS:                        9.2    9.06  )-----------( 277      ( 28 Mar 2024 06:00 )             28.2     03-29    133<L>  |  101  |  29.4<H>  ----------------------------<  92  4.9   |  19.0<L>  |  1.78<H>    Ca    8.8      29 Mar 2024 06:20  Phos  3.8     03-29  Mg     2.0     03-29          I&O's Summary    28 Mar 2024 07:01  -  29 Mar 2024 07:00  --------------------------------------------------------  IN: 240 mL / OUT: 625 mL / NET: -385 mL    29 Mar 2024 07:01  -  29 Mar 2024 10:47  --------------------------------------------------------  IN: 0 mL / OUT: 700 mL / NET: -700 mL        MEDICATIONS  (STANDING):  acetaminophen     Tablet .. 975 milliGRAM(s) Oral every 6 hours  apixaban 2.5 milliGRAM(s) Oral every 12 hours  atorvastatin 20 milliGRAM(s) Oral at bedtime  DULoxetine 30 milliGRAM(s) Oral daily  finasteride 5 milliGRAM(s) Oral daily  gabapentin 100 milliGRAM(s) Oral three times a day  influenza  Vaccine (HIGH DOSE) 0.7 milliLiter(s) IntraMuscular once  levothyroxine 25 MICROGram(s) Oral daily  lidocaine   4% Patch 3 Patch Transdermal every 24 hours  melatonin 3 milliGRAM(s) Oral at bedtime  metoprolol tartrate 25 milliGRAM(s) Oral two times a day  nicotine - 21 mG/24Hr(s) Patch 1 Patch Transdermal daily  polyethylene glycol 3350 17 Gram(s) Oral daily  senna 2 Tablet(s) Oral at bedtime  sodium chloride 0.9%. 1000 milliLiter(s) (42 mL/Hr) IV Continuous <Continuous>  sucralfate 1 Gram(s) Oral two times a day  tamsulosin 0.4 milliGRAM(s) Oral at bedtime  trimethoprim  160 mG/sulfamethoxazole 800 mG 1 Tablet(s) Oral every 24 hours    MEDICATIONS  (PRN):  methocarbamol 500 milliGRAM(s) Oral every 8 hours PRN Muscle Spasm  oxyCODONE    IR 2.5 milliGRAM(s) Oral every 4 hours PRN Moderate Pain (4 - 6)  oxyCODONE    IR 5 milliGRAM(s) Oral every 4 hours PRN Severe Pain (7 - 10)

## 2024-03-29 NOTE — DIETITIAN INITIAL EVALUATION ADULT - ADD RECOMMEND
Rx: MVI, thiamine, folic acid supplementation. Continue bowel regimen PRN. Encourage po intake, monitor diet tolerance, and provide assistance at meals as needed. Obtain daily weights to monitor trends.

## 2024-03-30 LAB
-  AMOXICILLIN/CLAVULANIC ACID: SIGNIFICANT CHANGE UP
-  AMPICILLIN/SULBACTAM: SIGNIFICANT CHANGE UP
-  AMPICILLIN: SIGNIFICANT CHANGE UP
-  AZTREONAM: SIGNIFICANT CHANGE UP
-  CEFAZOLIN: SIGNIFICANT CHANGE UP
-  CEFEPIME: SIGNIFICANT CHANGE UP
-  CEFOXITIN: SIGNIFICANT CHANGE UP
-  CEFTRIAXONE: SIGNIFICANT CHANGE UP
-  CIPROFLOXACIN: SIGNIFICANT CHANGE UP
-  ERTAPENEM: SIGNIFICANT CHANGE UP
-  GENTAMICIN: SIGNIFICANT CHANGE UP
-  LEVOFLOXACIN: SIGNIFICANT CHANGE UP
-  MEROPENEM: SIGNIFICANT CHANGE UP
-  NITROFURANTOIN: SIGNIFICANT CHANGE UP
-  PIPERACILLIN/TAZOBACTAM: SIGNIFICANT CHANGE UP
-  TOBRAMYCIN: SIGNIFICANT CHANGE UP
-  TRIMETHOPRIM/SULFAMETHOXAZOLE: SIGNIFICANT CHANGE UP
ANION GAP SERPL CALC-SCNC: 11 MMOL/L — SIGNIFICANT CHANGE UP (ref 5–17)
ANION GAP SERPL CALC-SCNC: 12 MMOL/L — SIGNIFICANT CHANGE UP (ref 5–17)
BUN SERPL-MCNC: 25.2 MG/DL — HIGH (ref 8–20)
BUN SERPL-MCNC: 27.5 MG/DL — HIGH (ref 8–20)
CALCIUM SERPL-MCNC: 9.5 MG/DL — SIGNIFICANT CHANGE UP (ref 8.4–10.5)
CALCIUM SERPL-MCNC: 9.7 MG/DL — SIGNIFICANT CHANGE UP (ref 8.4–10.5)
CHLORIDE SERPL-SCNC: 101 MMOL/L — SIGNIFICANT CHANGE UP (ref 96–108)
CHLORIDE SERPL-SCNC: 101 MMOL/L — SIGNIFICANT CHANGE UP (ref 96–108)
CO2 SERPL-SCNC: 21 MMOL/L — LOW (ref 22–29)
CO2 SERPL-SCNC: 21 MMOL/L — LOW (ref 22–29)
CREAT SERPL-MCNC: 1.54 MG/DL — HIGH (ref 0.5–1.3)
CREAT SERPL-MCNC: 1.69 MG/DL — HIGH (ref 0.5–1.3)
CULTURE RESULTS: ABNORMAL
EGFR: 42 ML/MIN/1.73M2 — LOW
EGFR: 46 ML/MIN/1.73M2 — LOW
GLUCOSE SERPL-MCNC: 114 MG/DL — HIGH (ref 70–99)
GLUCOSE SERPL-MCNC: 99 MG/DL — SIGNIFICANT CHANGE UP (ref 70–99)
METHOD TYPE: SIGNIFICANT CHANGE UP
ORGANISM # SPEC MICROSCOPIC CNT: ABNORMAL
ORGANISM # SPEC MICROSCOPIC CNT: SIGNIFICANT CHANGE UP
POTASSIUM SERPL-MCNC: 5.3 MMOL/L — SIGNIFICANT CHANGE UP (ref 3.5–5.3)
POTASSIUM SERPL-MCNC: 5.4 MMOL/L — HIGH (ref 3.5–5.3)
POTASSIUM SERPL-SCNC: 5.3 MMOL/L — SIGNIFICANT CHANGE UP (ref 3.5–5.3)
POTASSIUM SERPL-SCNC: 5.4 MMOL/L — HIGH (ref 3.5–5.3)
SODIUM SERPL-SCNC: 133 MMOL/L — LOW (ref 135–145)
SODIUM SERPL-SCNC: 133 MMOL/L — LOW (ref 135–145)
SPECIMEN SOURCE: SIGNIFICANT CHANGE UP

## 2024-03-30 RX ORDER — CEFTRIAXONE 500 MG/1
1000 INJECTION, POWDER, FOR SOLUTION INTRAMUSCULAR; INTRAVENOUS EVERY 24 HOURS
Refills: 0 | Status: DISCONTINUED | OUTPATIENT
Start: 2024-03-30 | End: 2024-04-01

## 2024-03-30 RX ORDER — SODIUM CHLORIDE 9 MG/ML
1000 INJECTION INTRAMUSCULAR; INTRAVENOUS; SUBCUTANEOUS
Refills: 0 | Status: DISCONTINUED | OUTPATIENT
Start: 2024-03-30 | End: 2024-04-01

## 2024-03-30 RX ORDER — CEFTRIAXONE 500 MG/1
INJECTION, POWDER, FOR SOLUTION INTRAMUSCULAR; INTRAVENOUS
Refills: 0 | Status: DISCONTINUED | OUTPATIENT
Start: 2024-03-30 | End: 2024-03-30

## 2024-03-30 RX ORDER — SODIUM CHLORIDE 9 MG/ML
500 INJECTION INTRAMUSCULAR; INTRAVENOUS; SUBCUTANEOUS ONCE
Refills: 0 | Status: COMPLETED | OUTPATIENT
Start: 2024-03-30 | End: 2024-03-30

## 2024-03-30 RX ADMIN — DULOXETINE HYDROCHLORIDE 30 MILLIGRAM(S): 30 CAPSULE, DELAYED RELEASE ORAL at 11:40

## 2024-03-30 RX ADMIN — FINASTERIDE 5 MILLIGRAM(S): 5 TABLET, FILM COATED ORAL at 11:41

## 2024-03-30 RX ADMIN — APIXABAN 2.5 MILLIGRAM(S): 2.5 TABLET, FILM COATED ORAL at 17:05

## 2024-03-30 RX ADMIN — OXYCODONE HYDROCHLORIDE 5 MILLIGRAM(S): 5 TABLET ORAL at 04:29

## 2024-03-30 RX ADMIN — ATORVASTATIN CALCIUM 20 MILLIGRAM(S): 80 TABLET, FILM COATED ORAL at 22:01

## 2024-03-30 RX ADMIN — Medication 1 TABLET(S): at 11:39

## 2024-03-30 RX ADMIN — TAMSULOSIN HYDROCHLORIDE 0.4 MILLIGRAM(S): 0.4 CAPSULE ORAL at 22:02

## 2024-03-30 RX ADMIN — Medication 1 GRAM(S): at 17:05

## 2024-03-30 RX ADMIN — SENNA PLUS 2 TABLET(S): 8.6 TABLET ORAL at 22:02

## 2024-03-30 RX ADMIN — GABAPENTIN 100 MILLIGRAM(S): 400 CAPSULE ORAL at 05:06

## 2024-03-30 RX ADMIN — Medication 1 GRAM(S): at 05:06

## 2024-03-30 RX ADMIN — Medication 25 MILLIGRAM(S): at 05:06

## 2024-03-30 RX ADMIN — POLYETHYLENE GLYCOL 3350 17 GRAM(S): 17 POWDER, FOR SOLUTION ORAL at 11:39

## 2024-03-30 RX ADMIN — Medication 975 MILLIGRAM(S): at 17:06

## 2024-03-30 RX ADMIN — SODIUM CHLORIDE 75 MILLILITER(S): 9 INJECTION INTRAMUSCULAR; INTRAVENOUS; SUBCUTANEOUS at 17:14

## 2024-03-30 RX ADMIN — Medication 3 MILLIGRAM(S): at 22:01

## 2024-03-30 RX ADMIN — Medication 975 MILLIGRAM(S): at 12:40

## 2024-03-30 RX ADMIN — Medication 25 MICROGRAM(S): at 05:06

## 2024-03-30 RX ADMIN — Medication 975 MILLIGRAM(S): at 18:06

## 2024-03-30 RX ADMIN — Medication 25 MILLIGRAM(S): at 17:05

## 2024-03-30 RX ADMIN — Medication 975 MILLIGRAM(S): at 05:06

## 2024-03-30 RX ADMIN — APIXABAN 2.5 MILLIGRAM(S): 2.5 TABLET, FILM COATED ORAL at 05:06

## 2024-03-30 RX ADMIN — CEFTRIAXONE 1000 MILLIGRAM(S): 500 INJECTION, POWDER, FOR SOLUTION INTRAMUSCULAR; INTRAVENOUS at 17:08

## 2024-03-30 RX ADMIN — Medication 975 MILLIGRAM(S): at 22:02

## 2024-03-30 RX ADMIN — SODIUM CHLORIDE 250 MILLILITER(S): 9 INJECTION INTRAMUSCULAR; INTRAVENOUS; SUBCUTANEOUS at 11:20

## 2024-03-30 RX ADMIN — Medication 975 MILLIGRAM(S): at 23:00

## 2024-03-30 RX ADMIN — GABAPENTIN 100 MILLIGRAM(S): 400 CAPSULE ORAL at 22:01

## 2024-03-30 RX ADMIN — LIDOCAINE 3 PATCH: 4 CREAM TOPICAL at 17:08

## 2024-03-30 RX ADMIN — Medication 975 MILLIGRAM(S): at 11:40

## 2024-03-30 NOTE — CHART NOTE - NSCHARTNOTESELECT_GEN_ALL_CORE
Event Note
Downgrade/Transfer Note
Event Note
SICU downgrade/Transfer Note
Tertiary Survey/Event Note

## 2024-03-30 NOTE — PROGRESS NOTE ADULT - SUBJECTIVE AND OBJECTIVE BOX
Patient seen and examined . Comfortable , pain well controlled , denies n/v , voiding , had BM today .     CC : L sided chest pain after fall , pain now well controlled         MEDICATIONS  (STANDING):  acetaminophen     Tablet .. 975 milliGRAM(s) Oral every 6 hours  apixaban 2.5 milliGRAM(s) Oral every 12 hours  atorvastatin 20 milliGRAM(s) Oral at bedtime  DULoxetine 30 milliGRAM(s) Oral daily  finasteride 5 milliGRAM(s) Oral daily  gabapentin 100 milliGRAM(s) Oral three times a day  influenza  Vaccine (HIGH DOSE) 0.7 milliLiter(s) IntraMuscular once  levothyroxine 25 MICROGram(s) Oral daily  lidocaine   4% Patch 3 Patch Transdermal every 24 hours  melatonin 3 milliGRAM(s) Oral at bedtime  metoprolol tartrate 25 milliGRAM(s) Oral two times a day  nicotine - 21 mG/24Hr(s) Patch 1 Patch Transdermal daily  polyethylene glycol 3350 17 Gram(s) Oral daily  senna 2 Tablet(s) Oral at bedtime  sucralfate 1 Gram(s) Oral two times a day  tamsulosin 0.4 milliGRAM(s) Oral at bedtime  trimethoprim  160 mG/sulfamethoxazole 800 mG 1 Tablet(s) Oral every 24 hours    MEDICATIONS  (PRN):  methocarbamol 500 milliGRAM(s) Oral every 8 hours PRN Muscle Spasm  oxyCODONE    IR 2.5 milliGRAM(s) Oral every 4 hours PRN Moderate Pain (4 - 6)  oxyCODONE    IR 5 milliGRAM(s) Oral every 4 hours PRN Severe Pain (7 - 10)      LABS:      03-30    133<L>  |  101  |  25.2<H>  ----------------------------<  99  5.3   |  21.0<L>  |  1.54<H>    Ca    9.5      30 Mar 2024 07:21  Phos  3.8     03-29  Mg     2.0     03-29    Urinalysis + Microscopic Examination (03.27.24 @ 08:15)    pH Urine: 6.0   Urine Appearance: Clear   Color: Yellow   Specific Gravity: 1.010   Protein, Urine: Negative mg/dL   Glucose Qualitative, Urine: Negative mg/dL   Ketone - Urine: Negative mg/dL   Blood, Urine: Negative   Bilirubin: Negative   Urobilinogen: 1.0 mg/dL   Leukocyte Esterase Concentration: Large   Nitrite: Positive   White Blood Cell - Urine: 71 /HPF   Red Blood Cell - Urine: 0 /HPF   Bacteria: Many /HPF   Cast: 1 /LPF   Epithelial Cells: 1 /HPF    Culture - Urine (03.27.24 @ 11:05)    -  Amoxicillin/Clavulanic Acid: R >16/8   -  Ampicillin: R >16 These ampicillin results predict results for amoxicillin   -  Ampicillin/Sulbactam: I 16/8   -  Aztreonam: S <=4   -  Cefazolin: R >16   -  Cefepime: S <=2   -  Cefoxitin: S <=8   -  Ceftriaxone: S <=1   -  Ciprofloxacin: R >2   -  Ertapenem: S <=0.5   -  Gentamicin: S <=2   -  Levofloxacin: R >4   -  Meropenem: S <=1   -  Nitrofurantoin: R 64 Should not be used to treat pyelonephritis   -  Piperacillin/Tazobactam: S <=8   -  Tobramycin: S <=2   -  Trimethoprim/Sulfamethoxazole: R >2/38   Specimen Source: Clean Catch Clean Catch (Midstream)   Culture Results:   >100,000 CFU/ml Morganella morganii   Organism Identification: Morganella morganii   Organism: Morganella morganii   Method Type: [facility]        RADIOLOGY & ADDITIONAL TESTS:      REVIEW OF SYSTEMS:    CONSTITUTIONAL: No fever, weight loss, or fatigue  EYES: No eye pain, visual disturbances, or discharge  ENMT:  No difficulty hearing, tinnitus, vertigo; No sinus or throat pain  NECK: No pain or stiffness  RESPIRATORY: No cough, wheezing, chills or hemoptysis; No shortness of breath  CARDIOVASCULAR: No chest pain, palpitations, dizziness, or leg swelling  GASTROINTESTINAL: No abdominal or epigastric pain. No nausea, vomiting, or hematemesis; No diarrhea or constipation. No melena or hematochezia.  GENITOURINARY: No dysuria, frequency, hematuria, or incontinence  NEUROLOGICAL: No headaches, memory loss, loss of strength, numbness, or tremors  SKIN: No itching, burning, rashes, or lesions   LYMPH NODES: No enlarged glands  ENDOCRINE: No heat or cold intolerance; No hair loss  MUSCULOSKELETAL:   PSYCHIATRIC: No depression, anxiety, mood swings, or difficulty sleeping  HEME/LYMPH: No easy bruising, or bleeding gums  ALLERGY AND IMMUNOLOGIC: No hives or eczema    Vital Signs Last 24 Hrs  T(C): 36.4 (30 Mar 2024 11:00), Max: 37.3 (29 Mar 2024 19:14)  T(F): 97.5 (30 Mar 2024 11:00), Max: 99.2 (29 Mar 2024 19:14)  HR: 57 (30 Mar 2024 11:00) (55 - 66)  BP: 110/69 (30 Mar 2024 11:00) (110/69 - 168/87)  BP(mean): --  RR: 17 (30 Mar 2024 11:00) (12 - 18)  SpO2: 96% (30 Mar 2024 11:00) (94% - 99%)    Parameters below as of 30 Mar 2024 11:00  Patient On (Oxygen Delivery Method): room air      PHYSICAL EXAM:    GENERAL: NAD, well-groomed, well-developed  HEAD:  Atraumatic, Normocephalic  EYES: EOMI, PERRLA, conjunctiva and sclera clear  NECK: Supple, No JVD, Normal thyroid  NERVOUS SYSTEM:  Alert & Oriented X3, no focal deficit  CHEST/LUNG: CTA b/l ,  no  rales, rhonchi, wheezing, or rubs  HEART: Regular rate and rhythm; No murmurs, rubs, or gallops  ABDOMEN: Soft, Nontender, Nondistended; Bowel sounds present  EXTREMITIES:  2+ Peripheral Pulses, No clubbing, cyanosis, or edema  LYMPH: No lymphadenopathy noted  SKIN: No rashes or lesions

## 2024-03-30 NOTE — PROGRESS NOTE ADULT - SUBJECTIVE AND OBJECTIVE BOX
Subjective: Patient seen and examined at bedside. No overnight events or acute complaints. Admits feeling well. Denies nausea/ vomiting, diarrhea, fever, chills, shortness of breath, chest pain or any other symptoms.     MEDICATIONS  (STANDING):  acetaminophen     Tablet .. 975 milliGRAM(s) Oral every 6 hours  apixaban 2.5 milliGRAM(s) Oral every 12 hours  atorvastatin 20 milliGRAM(s) Oral at bedtime  DULoxetine 30 milliGRAM(s) Oral daily  finasteride 5 milliGRAM(s) Oral daily  gabapentin 100 milliGRAM(s) Oral three times a day  influenza  Vaccine (HIGH DOSE) 0.7 milliLiter(s) IntraMuscular once  levothyroxine 25 MICROGram(s) Oral daily  lidocaine   4% Patch 3 Patch Transdermal every 24 hours  melatonin 3 milliGRAM(s) Oral at bedtime  metoprolol tartrate 25 milliGRAM(s) Oral two times a day  nicotine - 21 mG/24Hr(s) Patch 1 Patch Transdermal daily  polyethylene glycol 3350 17 Gram(s) Oral daily  senna 2 Tablet(s) Oral at bedtime  sodium chloride 0.9%. 1000 milliLiter(s) (42 mL/Hr) IV Continuous <Continuous>  sucralfate 1 Gram(s) Oral two times a day  tamsulosin 0.4 milliGRAM(s) Oral at bedtime  trimethoprim  160 mG/sulfamethoxazole 800 mG 1 Tablet(s) Oral every 24 hours    MEDICATIONS  (PRN):  methocarbamol 500 milliGRAM(s) Oral every 8 hours PRN Muscle Spasm  oxyCODONE    IR 5 milliGRAM(s) Oral every 4 hours PRN Severe Pain (7 - 10)  oxyCODONE    IR 2.5 milliGRAM(s) Oral every 4 hours PRN Moderate Pain (4 - 6)      Vital Signs Last 24 Hrs  T(C): 37.3 (29 Mar 2024 19:14), Max: 37.3 (29 Mar 2024 19:14)  T(F): 99.2 (29 Mar 2024 19:14), Max: 99.2 (29 Mar 2024 19:14)  HR: 63 (29 Mar 2024 19:14) (56 - 66)  BP: 140/79 (29 Mar 2024 19:14) (107/69 - 168/87)  BP(mean): --  RR: 12 (29 Mar 2024 19:14) (12 - 18)  SpO2: 94% (29 Mar 2024 19:14) (94% - 99%)    Parameters below as of 29 Mar 2024 19:14  Patient On (Oxygen Delivery Method): room air            Physical Exam:    Constitutional: resting comfortably in bed, NAD  Respiratory: Respirations non-labored, no accessory muscle use  Gastrointestinal: Soft, non-tender, non-distended  Extremities: No peripheral edema  Musculoskeletal: no limitation of movement       LABS:                                 9.2    9.06  )-----------( 277      ( 28 Mar 2024 06:00 )             28.2   03-29    133<L>  |  101  |  29.4<H>  ----------------------------<  92  4.9   |  19.0<L>  |  1.78<H>    Ca    8.8      29 Mar 2024 06:20  Phos  3.8     03-29  Mg     2.0     03-29        Urinalysis Basic - ( 29 Mar 2024 06:20 )    Color: x / Appearance: x / SG: x / pH: x  Gluc: 92 mg/dL / Ketone: x  / Bili: x / Urobili: x   Blood: x / Protein: x / Nitrite: x   Leuk Esterase: x / RBC: x / WBC x   Sq Epi: x / Non Sq Epi: x / Bacteria: x      A: 77 yo M being managed for rib fxs, chronic hyponatremia, and UTI, downgraded from ICU on 3/27.  Currently stable. Creatinine levels had increased, have remained increased.       Plan:     - For rib fxs: pic protocol, multi-modal pain regimen, OOB, encourage ambulation. ***OPIATE ARM OF RIB FRACTURE STUDY***  - c/w Bactrim for UTI   - reg diet   -IVF d/c at midnight  - PT rec home w/ RW and home PT  - dispo: pending U/S, Cr and electrolytes this morning

## 2024-03-30 NOTE — CHART NOTE - NSCHARTNOTEFT_GEN_A_CORE
Spoke with ACS team, plan to keep pt in inpatient today. SW cancelled ambulette and advised facility.

## 2024-03-30 NOTE — CONSULT NOTE ADULT - SUBJECTIVE AND OBJECTIVE BOX
INFECTIOUS DISEASES AND INTERNAL MEDICINE at [place]  =======================================================  Aj Honeycutt MD  Diplomates American Board of Internal Medicine and Infectious Diseases  Telephone [phone]  Fax            [phone]  =======================================================    SCOTT [id number]      HPI:  78M presents as transfer from [facility] s/p fall with L 4-10 rib fractures. Patient fell out of bed this morning and hit his head on the night stand, no LOC. He landed on his L side but was able to stand and ambulate afterwards. Only concern was L sided chest pain.    AS ABOVE TRANSFERRED FROM [place]  FOR TRAUMA CARE NOTED TO BE HYPONATREMIC  PT HAD URINE CX SENT WS STARTED ON BACTRIM   URINE CX RESULTED AND R TO BACTRIM   ASKED TO EVALUATE FROM ID STANDPOINT        PAST MEDICAL & SURGICAL HISTORY:  Hypothyroidism      Benign essential HTN      BPH (benign prostatic hyperplasia)      Smokes tobacco daily      Alcohol use disorder, mild, abuse      S/P CABG (coronary artery bypass graft)      Coronary stent patent          ANTIBIOTICS  cefTRIAXone Injectable. 1000 milliGRAM(s) IV Push every 24 hours      Allergies    No Known Allergies    Intolerances        SOCIAL HISTORY:     FAMILY HX   FAMILY HISTORY:      Vital Signs Last 24 Hrs  T(C): 36.4 (30 Mar 2024 11:00), Max: 37.3 (29 Mar 2024 19:14)  T(F): 97.5 (30 Mar 2024 11:00), Max: 99.2 (29 Mar 2024 19:14)  HR: 57 (30 Mar 2024 11:00) (55 - 66)  BP: 110/69 (30 Mar 2024 11:00) (110/69 - 168/87)  BP(mean): --  RR: 17 (30 Mar 2024 11:00) (12 - 18)  SpO2: 96% (30 Mar 2024 11:00) (94% - 99%)    Parameters below as of 30 Mar 2024 11:00  Patient On (Oxygen Delivery Method): room air      Drug Dosing Weight  Height (cm): 172.7 (25 Mar 2024 15:48)  Weight (kg): 55.9 (26 Mar 2024 01:00)  BMI (kg/m2): 18.7 (26 Mar 2024 01:00)  BSA (m2): 1.66 (26 Mar 2024 01:00)      REVIEW OF SYSTEMS:    CONSTITUTIONAL:  As per HPI.    HEENT:  Eyes:  No diplopia or blurred vision. ENT:  No earache, sore throat or runny nose.    CARDIOVASCULAR:  No pressure, squeezing, strangling, tightness, heaviness or aching about the chest, neck, axilla or epigastrium.    RESPIRATORY:  No cough, shortness of breath, PND or orthopnea.    GASTROINTESTINAL:  No nausea, vomiting or diarrhea.    GENITOURINARY:  No dysuria, frequency or urgency.    MUSCULOSKELETAL:  As per HPI.    SKIN:  No change in skin, hair or nails.    NEUROLOGIC:  No paresthesias, fasciculations, seizures or weakness.                  PHYSICAL EXAMINATION:    GENERAL: The patient is a _____in no apparent distress. ___     VITAL SIGNS: T(C): 36.4 (03-30-24 @ 11:00), Max: 37.3 (03-29-24 @ 19:14)  HR: 57 (03-30-24 @ 11:00) (55 - 66)  BP: 110/69 (03-30-24 @ 11:00) (110/69 - 168/87)  RR: 17 (03-30-24 @ 11:00) (12 - 18)  SpO2: 96% (03-30-24 @ 11:00) (94% - 99%)  Wt(kg): --    HEENT: Head is normocephalic and atraumatic.  ANICTERIC  NECK: Supple. No carotid bruits.  No lymphadenopathy or thyromegaly.    LUNGS:COARSE BREATH SOUNDS    HEART: Regular rate and rhythm without murmur.    ABDOMEN: Soft, nontender, and nondistended.  Positive bowel sounds.  No hepatosplenomegaly was noted. NO REBOUND NO GUARDING    EXTREMITIES: NO EDEMA NO ERYTHEMA    NEUROLOGIC: NON FOCAL      SKIN: No ulceration or induration present. NO RASH        BLOOD CULTURES  Culture Results:   >100,000 CFU/ml Morganella morganii (03-27 @ 11:05)       URINE CX          LABS:    03-30    133<L>  |  101  |  25.2<H>  ----------------------------<  99  5.3   |  21.0<L>  |  1.54<H>    Ca    9.5      30 Mar 2024 07:21  Phos  3.8     03-29  Mg     2.0     03-29        Urinalysis Basic - ( 30 Mar 2024 07:21 )    Color: x / Appearance: x / SG: x / pH: x  Gluc: 99 mg/dL / Ketone: x  / Bili: x / Urobili: x   Blood: x / Protein: x / Nitrite: x   Leuk Esterase: x / RBC: x / WBC x   Sq Epi: x / Non Sq Epi: x / Bacteria: x        RADIOLOGY & ADDITIONAL STUDIES:      ASSESSMENT/PLAN    78M presents as transfer from [facility] s/p fall with L 4-10 rib fractures. Patient fell out of bed this morning and hit his head on the night stand, no LOC. He landed on his L side but was able to stand and ambulate afterwards. Only concern was L sided chest pain.    AS ABOVE TRANSFERRED FROM [place]  FOR TRAUMA CARE NOTED TO BE HYPONATREMIC  PT HAD URINE CX SENT WAS STARTED ON BACTRIM   URINE CX RESULTED AND R TO BACTRIM   PLACED ON ROCEPHIN TODAY   UNCLEAR HOW URINE WAS COLLECTED AND DENIES  SX   BUT WOULD SUGGEST TO COMPLETE 7 DAYS IN THIS ELDERLY MALE PATIENT   UNFORTUNATELY NO GOOD ORAL AGENT IN THIS PATIENT  WILL RX ROCEPHIN 1 GM Q 24 FOR 7 DAYS  WILL FOLLOWUP WITH FURTHER RECOMMENDATIONS                 KRZYSZTOF WALLIS MD

## 2024-03-31 LAB
ACANTHOCYTES BLD QL SMEAR: SLIGHT — SIGNIFICANT CHANGE UP
ANION GAP SERPL CALC-SCNC: 11 MMOL/L — SIGNIFICANT CHANGE UP (ref 5–17)
BASOPHILS # BLD AUTO: 0 K/UL — SIGNIFICANT CHANGE UP (ref 0–0.2)
BASOPHILS NFR BLD AUTO: 0 % — SIGNIFICANT CHANGE UP (ref 0–2)
BUN SERPL-MCNC: 27.1 MG/DL — HIGH (ref 8–20)
CALCIUM SERPL-MCNC: 9.4 MG/DL — SIGNIFICANT CHANGE UP (ref 8.4–10.5)
CHLORIDE SERPL-SCNC: 100 MMOL/L — SIGNIFICANT CHANGE UP (ref 96–108)
CO2 SERPL-SCNC: 22 MMOL/L — SIGNIFICANT CHANGE UP (ref 22–29)
CREAT SERPL-MCNC: 1.52 MG/DL — HIGH (ref 0.5–1.3)
EGFR: 47 ML/MIN/1.73M2 — LOW
EOSINOPHIL # BLD AUTO: 0.14 K/UL — SIGNIFICANT CHANGE UP (ref 0–0.5)
EOSINOPHIL NFR BLD AUTO: 1.8 % — SIGNIFICANT CHANGE UP (ref 0–6)
GIANT PLATELETS BLD QL SMEAR: PRESENT — SIGNIFICANT CHANGE UP
GLUCOSE SERPL-MCNC: 98 MG/DL — SIGNIFICANT CHANGE UP (ref 70–99)
HCT VFR BLD CALC: 28 % — LOW (ref 39–50)
HGB BLD-MCNC: 9.1 G/DL — LOW (ref 13–17)
LYMPHOCYTES # BLD AUTO: 0.92 K/UL — LOW (ref 1–3.3)
LYMPHOCYTES # BLD AUTO: 11.7 % — LOW (ref 13–44)
MAGNESIUM SERPL-MCNC: 1.6 MG/DL — SIGNIFICANT CHANGE UP (ref 1.6–2.6)
MANUAL SMEAR VERIFICATION: SIGNIFICANT CHANGE UP
MCHC RBC-ENTMCNC: 28 PG — SIGNIFICANT CHANGE UP (ref 27–34)
MCHC RBC-ENTMCNC: 32.5 GM/DL — SIGNIFICANT CHANGE UP (ref 32–36)
MCV RBC AUTO: 86.2 FL — SIGNIFICANT CHANGE UP (ref 80–100)
METAMYELOCYTES # FLD: 0.9 % — HIGH (ref 0–0)
MONOCYTES # BLD AUTO: 0.21 K/UL — SIGNIFICANT CHANGE UP (ref 0–0.9)
MONOCYTES NFR BLD AUTO: 2.7 % — SIGNIFICANT CHANGE UP (ref 2–14)
MYELOCYTES NFR BLD: 2.7 % — HIGH (ref 0–0)
NEUTROPHILS # BLD AUTO: 5.6 K/UL — SIGNIFICANT CHANGE UP (ref 1.8–7.4)
NEUTROPHILS NFR BLD AUTO: 71.2 % — SIGNIFICANT CHANGE UP (ref 43–77)
OVALOCYTES BLD QL SMEAR: SLIGHT — SIGNIFICANT CHANGE UP
PHOSPHATE SERPL-MCNC: 3.7 MG/DL — SIGNIFICANT CHANGE UP (ref 2.4–4.7)
PLAT MORPH BLD: NORMAL — SIGNIFICANT CHANGE UP
PLATELET # BLD AUTO: 326 K/UL — SIGNIFICANT CHANGE UP (ref 150–400)
POIKILOCYTOSIS BLD QL AUTO: SLIGHT — SIGNIFICANT CHANGE UP
POTASSIUM SERPL-MCNC: 4.8 MMOL/L — SIGNIFICANT CHANGE UP (ref 3.5–5.3)
POTASSIUM SERPL-SCNC: 4.8 MMOL/L — SIGNIFICANT CHANGE UP (ref 3.5–5.3)
PROMYELOCYTES # FLD: 0.9 % — HIGH (ref 0–0)
RBC # BLD: 3.25 M/UL — LOW (ref 4.2–5.8)
RBC # FLD: 15.6 % — HIGH (ref 10.3–14.5)
RBC BLD AUTO: ABNORMAL
SMUDGE CELLS # BLD: PRESENT — SIGNIFICANT CHANGE UP
SODIUM SERPL-SCNC: 133 MMOL/L — LOW (ref 135–145)
VARIANT LYMPHS # BLD: 8.1 % — HIGH (ref 0–6)
WBC # BLD: 7.86 K/UL — SIGNIFICANT CHANGE UP (ref 3.8–10.5)
WBC # FLD AUTO: 7.86 K/UL — SIGNIFICANT CHANGE UP (ref 3.8–10.5)

## 2024-03-31 RX ADMIN — Medication 975 MILLIGRAM(S): at 13:27

## 2024-03-31 RX ADMIN — Medication 975 MILLIGRAM(S): at 12:27

## 2024-03-31 RX ADMIN — CEFTRIAXONE 1000 MILLIGRAM(S): 500 INJECTION, POWDER, FOR SOLUTION INTRAMUSCULAR; INTRAVENOUS at 14:57

## 2024-03-31 RX ADMIN — LIDOCAINE 3 PATCH: 4 CREAM TOPICAL at 06:00

## 2024-03-31 RX ADMIN — Medication 975 MILLIGRAM(S): at 17:56

## 2024-03-31 RX ADMIN — Medication 975 MILLIGRAM(S): at 18:56

## 2024-03-31 RX ADMIN — ATORVASTATIN CALCIUM 20 MILLIGRAM(S): 80 TABLET, FILM COATED ORAL at 22:29

## 2024-03-31 RX ADMIN — Medication 975 MILLIGRAM(S): at 06:42

## 2024-03-31 RX ADMIN — POLYETHYLENE GLYCOL 3350 17 GRAM(S): 17 POWDER, FOR SOLUTION ORAL at 12:26

## 2024-03-31 RX ADMIN — SENNA PLUS 2 TABLET(S): 8.6 TABLET ORAL at 22:29

## 2024-03-31 RX ADMIN — Medication 1 GRAM(S): at 17:59

## 2024-03-31 RX ADMIN — GABAPENTIN 100 MILLIGRAM(S): 400 CAPSULE ORAL at 22:28

## 2024-03-31 RX ADMIN — APIXABAN 2.5 MILLIGRAM(S): 2.5 TABLET, FILM COATED ORAL at 05:42

## 2024-03-31 RX ADMIN — GABAPENTIN 100 MILLIGRAM(S): 400 CAPSULE ORAL at 14:07

## 2024-03-31 RX ADMIN — TAMSULOSIN HYDROCHLORIDE 0.4 MILLIGRAM(S): 0.4 CAPSULE ORAL at 22:29

## 2024-03-31 RX ADMIN — Medication 25 MICROGRAM(S): at 05:41

## 2024-03-31 RX ADMIN — Medication 975 MILLIGRAM(S): at 22:28

## 2024-03-31 RX ADMIN — DULOXETINE HYDROCHLORIDE 30 MILLIGRAM(S): 30 CAPSULE, DELAYED RELEASE ORAL at 12:27

## 2024-03-31 RX ADMIN — OXYCODONE HYDROCHLORIDE 5 MILLIGRAM(S): 5 TABLET ORAL at 23:28

## 2024-03-31 RX ADMIN — Medication 1 GRAM(S): at 05:41

## 2024-03-31 RX ADMIN — OXYCODONE HYDROCHLORIDE 5 MILLIGRAM(S): 5 TABLET ORAL at 05:42

## 2024-03-31 RX ADMIN — Medication 975 MILLIGRAM(S): at 23:28

## 2024-03-31 RX ADMIN — Medication 25 MILLIGRAM(S): at 05:41

## 2024-03-31 RX ADMIN — FINASTERIDE 5 MILLIGRAM(S): 5 TABLET, FILM COATED ORAL at 12:26

## 2024-03-31 RX ADMIN — APIXABAN 2.5 MILLIGRAM(S): 2.5 TABLET, FILM COATED ORAL at 17:57

## 2024-03-31 RX ADMIN — OXYCODONE HYDROCHLORIDE 5 MILLIGRAM(S): 5 TABLET ORAL at 22:28

## 2024-03-31 RX ADMIN — Medication 3 MILLIGRAM(S): at 22:29

## 2024-03-31 RX ADMIN — OXYCODONE HYDROCHLORIDE 5 MILLIGRAM(S): 5 TABLET ORAL at 06:42

## 2024-03-31 RX ADMIN — Medication 975 MILLIGRAM(S): at 05:42

## 2024-03-31 RX ADMIN — Medication 25 MILLIGRAM(S): at 17:57

## 2024-03-31 RX ADMIN — GABAPENTIN 100 MILLIGRAM(S): 400 CAPSULE ORAL at 05:41

## 2024-03-31 NOTE — PROGRESS NOTE ADULT - SUBJECTIVE AND OBJECTIVE BOX
Subjective: Patient seen resting comfortably in bed. No overnight events or acute complaints. Denies Nausea, vomiting, fever, chills, pain, shortness of breath, chest pain or any other complaints.         MEDICATIONS  (STANDING):  acetaminophen     Tablet .. 975 milliGRAM(s) Oral every 6 hours  apixaban 2.5 milliGRAM(s) Oral every 12 hours  atorvastatin 20 milliGRAM(s) Oral at bedtime  cefTRIAXone Injectable. 1000 milliGRAM(s) IV Push every 24 hours  DULoxetine 30 milliGRAM(s) Oral daily  finasteride 5 milliGRAM(s) Oral daily  gabapentin 100 milliGRAM(s) Oral three times a day  influenza  Vaccine (HIGH DOSE) 0.7 milliLiter(s) IntraMuscular once  levothyroxine 25 MICROGram(s) Oral daily  lidocaine   4% Patch 3 Patch Transdermal every 24 hours  melatonin 3 milliGRAM(s) Oral at bedtime  metoprolol tartrate 25 milliGRAM(s) Oral two times a day  nicotine - 21 mG/24Hr(s) Patch 1 Patch Transdermal daily  polyethylene glycol 3350 17 Gram(s) Oral daily  senna 2 Tablet(s) Oral at bedtime  sodium chloride 0.9%. 1000 milliLiter(s) (75 mL/Hr) IV Continuous <Continuous>  sucralfate 1 Gram(s) Oral two times a day  tamsulosin 0.4 milliGRAM(s) Oral at bedtime    MEDICATIONS  (PRN):  methocarbamol 500 milliGRAM(s) Oral every 8 hours PRN Muscle Spasm  oxyCODONE    IR 2.5 milliGRAM(s) Oral every 4 hours PRN Moderate Pain (4 - 6)  oxyCODONE    IR 5 milliGRAM(s) Oral every 4 hours PRN Severe Pain (7 - 10)      Vital Signs Last 24 Hrs  T(C): 36.5 (30 Mar 2024 21:36), Max: 36.5 (30 Mar 2024 16:03)  T(F): 97.7 (30 Mar 2024 21:36), Max: 97.7 (30 Mar 2024 16:03)  HR: 57 (30 Mar 2024 21:36) (57 - 62)  BP: 126/77 (30 Mar 2024 21:36) (110/69 - 126/77)  BP(mean): --  RR: 18 (30 Mar 2024 21:36) (17 - 18)  SpO2: 97% (30 Mar 2024 21:36) (94% - 97%)    Parameters below as of 30 Mar 2024 21:36  Patient On (Oxygen Delivery Method): room air        Physical Exam:    Constitutional: resting comfortably in bed, NAD  Respiratory: Respirations non-labored, no accessory muscle use  Gastrointestinal: Soft, non-tender, non-distended  Extremities: No peripheral edema  Neurological: A&O x 3   Musculoskeletal: No joint pain, swelling; no limitation of movement      LABS:    03-30    133<L>  |  101  |  27.5<H>  ----------------------------<  114<H>  5.4<H>   |  21.0<L>  |  1.69<H>    Ca    9.7      30 Mar 2024 15:17  Phos  3.8     03-29  Mg     2.0     03-29        Urinalysis Basic - ( 30 Mar 2024 15:17 )    Color: x / Appearance: x / SG: x / pH: x  Gluc: 114 mg/dL / Ketone: x  / Bili: x / Urobili: x   Blood: x / Protein: x / Nitrite: x   Leuk Esterase: x / RBC: x / WBC x   Sq Epi: x / Non Sq Epi: x / Bacteria: x        A: A: 77 yo M being managed for rib fxs, chronic hyponatremia, and UTI, downgraded from ICU on 3/27.  Currently stable. Creatinine levels had increased, have remained increased. Patient w Morganella morganii in cultures, started on Rocephin     Plan:   - - For rib fxs: pic protocol, multi-modal pain regimen, OOB, encourage ambulation. ***OPIATE ARM OF RIB FRACTURE STUDY***  - c/w Rocephin per ID    - reg diet  - Trend labs, Cr   - Encourage ambulation and OOB   - Encourage incentive spirometer   - DVT: SCDs  - Dispo: d/c on IV Abx

## 2024-03-31 NOTE — CONSULT NOTE ADULT - ASSESSMENT
78 M with Alcohol use disorder, Benign essential HTN, BPH, Hypothyroidism,  Smoker,  presents as transfer from [facility] s/p fall with L 4-10 rib fractures.  Nephrology consulted for Reji.        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 pt also was on NSAIDs  Scr now improving to 1.5  Continue Iv hdyration    Rib fractures  on pain control  avoid NSAIDs    UTI  sp Bactrim  now on rocephin      BPH  continue Flomax    HTN  Bp stable  Continue current regimen

## 2024-03-31 NOTE — CONSULT NOTE ADULT - SUBJECTIVE AND OBJECTIVE BOX
[facility] DIVISION OF KIDNEY DISEASES AND HYPERTENSION -- INITIAL CONSULT NOTE  --------------------------------------------------------------------------------  HPI:        PAST HISTORY  --------------------------------------------------------------------------------  PAST MEDICAL & SURGICAL HISTORY:  Hypothyroidism      Benign essential HTN      BPH (benign prostatic hyperplasia)      Smokes tobacco daily      Alcohol use disorder, mild, abuse      S/P CABG (coronary artery bypass graft)      Coronary stent patent        FAMILY HISTORY: no FH of kidney disease    PAST SOCIAL HISTORY: lives at home    ALLERGIES & MEDICATIONS  --------------------------------------------------------------------------------  Allergies  No Known Allergies        Standing Inpatient Medications  acetaminophen     Tablet .. 975 milliGRAM(s) Oral every 6 hours  apixaban 2.5 milliGRAM(s) Oral every 12 hours  atorvastatin 20 milliGRAM(s) Oral at bedtime  cefTRIAXone Injectable. 1000 milliGRAM(s) IV Push every 24 hours  DULoxetine 30 milliGRAM(s) Oral daily  finasteride 5 milliGRAM(s) Oral daily  gabapentin 100 milliGRAM(s) Oral three times a day  influenza  Vaccine (HIGH DOSE) 0.7 milliLiter(s) IntraMuscular once  levothyroxine 25 MICROGram(s) Oral daily  lidocaine   4% Patch 3 Patch Transdermal every 24 hours  melatonin 3 milliGRAM(s) Oral at bedtime  metoprolol tartrate 25 milliGRAM(s) Oral two times a day  nicotine - 21 mG/24Hr(s) Patch 1 Patch Transdermal daily  polyethylene glycol 3350 17 Gram(s) Oral daily  senna 2 Tablet(s) Oral at bedtime  sodium chloride 0.9%. 1000 milliLiter(s) IV Continuous <Continuous>  sucralfate 1 Gram(s) Oral two times a day  tamsulosin 0.4 milliGRAM(s) Oral at bedtime    PRN Inpatient Medications  methocarbamol 500 milliGRAM(s) Oral every 8 hours PRN  oxyCODONE    IR 2.5 milliGRAM(s) Oral every 4 hours PRN  oxyCODONE    IR 5 milliGRAM(s) Oral every 4 hours PRN      REVIEW OF SYSTEMS  --------------------------------------------------------------------------------  Gen: No weight changes, fatigue, fevers/chills, weakness  Skin: No rashes  Head/Eyes/Ears/Mouth: No headache; Normal hearing; Normal vision w/o blurriness; No sinus pain/discomfort, sore throat  Respiratory: No dyspnea, cough, wheezing, hemoptysis  CV: No chest pain, PND, orthopnea  GI: No abdominal pain, diarrhea, constipation, nausea, vomiting, melena, hematochezia  : No increased frequency, dysuria, hematuria, nocturia  MSK: No joint pain/swelling; no back pain; no edema  Neuro: No dizziness/lightheadedness, weakness, seizures, numbness, tingling  Heme: No easy bruising or bleeding  Endo: No heat/cold intolerance  Psych: No significant nervousness, anxiety, stress, depression    All other systems were reviewed and are negative, except as noted.    VITALS/PHYSICAL EXAM  --------------------------------------------------------------------------------  T(C): 37.3 (03-31-24 @ 11:06), Max: 37.3 (03-31-24 @ 11:06)  HR: 50 (03-31-24 @ 11:06) (50 - 62)  BP: 137/73 (03-31-24 @ 11:06) (118/75 - 138/83)  RR: 12 (03-31-24 @ 11:06) (12 - 18)  SpO2: 94% (03-31-24 @ 11:06) (92% - 97%)  Wt(kg): --        03-30-24 @ 07:01  -  03-31-24 @ 07:00  --------------------------------------------------------  IN: 2460 mL / OUT: 1650 mL / NET: 810 mL      Physical Exam:  	Gen: NAD, well-appearing  	HEENT: PERRL, supple neck, clear oropharynx  	Pulm: CTA B/L  	CV: RRR, S1S2; no rub  	Back: No spinal or CVA tenderness; no sacral edema  	Abd: +BS, soft, nontender/nondistended  	: No suprapubic tenderness  	UE: Warm, FROM, no clubbing, intact strength; no edema; no asterixis  	LE: Warm, FROM, no clubbing, intact strength; no edema  	Neuro: No focal deficits, intact gait  	Psych: Normal affect and mood  	Skin: Warm, without rashes  	Vascular access:    LABS/STUDIES  --------------------------------------------------------------------------------              9.1    7.86  >-----------<  326      [03-31-24 @ 06:09]              28.0     133  |  100  |  27.1  ----------------------------<  98      [03-31-24 @ 06:09]  4.8   |  22.0  |  1.52        Ca     9.4     [03-31-24 @ 06:09]      Mg     1.6     [03-31-24 @ 06:09]      Phos  3.7     [03-31-24 @ 06:09]        Creatinine Trend:  SCr 1.52 [03-31 @ 06:09]  SCr 1.69 [03-30 @ 15:17]  SCr 1.54 [03-30 @ 07:21]  SCr 1.78 [03-29 @ 06:20]  SCr 1.80 [03-28 @ 14:23]    Urinalysis - [03-31-24 @ 06:09]      Color  / Appearance  / SG  / pH       Gluc 98 / Ketone   / Bili  / Urobili        Blood  / Protein  / Leuk Est  / Nitrite       RBC  / WBC  / Hyaline  / Gran  / Sq Epi  / Non Sq Epi  / Bacteria     Urine Creatinine 27      [03-29-24 @ 12:36]  Urine Sodium 75      [03-29-24 @ 12:36]  Urine Chloride 105      [03-27-24 @ 08:15]  Urine Osmolality 265      [03-29-24 @ 12:37]    TSH 2.10      [03-26-24 @ 02:50]    HCV 0.10, Nonreact      [03-26-24 @ 02:50]     [facility] DIVISION OF KIDNEY DISEASES AND HYPERTENSION -- INITIAL CONSULT NOTE  --------------------------------------------------------------------------------  HPI:  78 M with Alcohol use disorder, Benign essential HTN, BPH, Hypothyroidism,  Smoker,  presents as transfer from [facility] s/p fall with L 4-10 rib fractures. Patient fell out of bed in morning and hit his head on the night stand, no LOC. He landed on his L side but was able to stand and ambulate afterwards, his imaging done showed Acute minimally displaced fractures of the left lateral seventh, eighth, ninth, and 10th ribs. subacute displaced fracture left 11th posterolateral rib, CT of the head and C spine showed no acute findings, he was sent to [facility] for further care.   nephrology consulted for Reji.  Pt was started on Bactrim for UTI- UCx growing Morganella- now switched to Rocephin.  Pt seen and examined- feels well.  denies any acute complaint.      PAST HISTORY  --------------------------------------------------------------------------------  PAST MEDICAL & SURGICAL HISTORY:  Hypothyroidism      Benign essential HTN      BPH (benign prostatic hyperplasia)      Smokes tobacco daily      Alcohol use disorder, mild, abuse      S/P CABG (coronary artery bypass graft)      Coronary stent patent        FAMILY HISTORY: no FH of kidney disease    PAST SOCIAL HISTORY: lives at home    ALLERGIES & MEDICATIONS  --------------------------------------------------------------------------------  Allergies  No Known Allergies        Standing Inpatient Medications  acetaminophen     Tablet .. 975 milliGRAM(s) Oral every 6 hours  apixaban 2.5 milliGRAM(s) Oral every 12 hours  atorvastatin 20 milliGRAM(s) Oral at bedtime  cefTRIAXone Injectable. 1000 milliGRAM(s) IV Push every 24 hours  DULoxetine 30 milliGRAM(s) Oral daily  finasteride 5 milliGRAM(s) Oral daily  gabapentin 100 milliGRAM(s) Oral three times a day  influenza  Vaccine (HIGH DOSE) 0.7 milliLiter(s) IntraMuscular once  levothyroxine 25 MICROGram(s) Oral daily  lidocaine   4% Patch 3 Patch Transdermal every 24 hours  melatonin 3 milliGRAM(s) Oral at bedtime  metoprolol tartrate 25 milliGRAM(s) Oral two times a day  nicotine - 21 mG/24Hr(s) Patch 1 Patch Transdermal daily  polyethylene glycol 3350 17 Gram(s) Oral daily  senna 2 Tablet(s) Oral at bedtime  sodium chloride 0.9%. 1000 milliLiter(s) IV Continuous <Continuous>  sucralfate 1 Gram(s) Oral two times a day  tamsulosin 0.4 milliGRAM(s) Oral at bedtime    PRN Inpatient Medications  methocarbamol 500 milliGRAM(s) Oral every 8 hours PRN  oxyCODONE    IR 2.5 milliGRAM(s) Oral every 4 hours PRN  oxyCODONE    IR 5 milliGRAM(s) Oral every 4 hours PRN      REVIEW OF SYSTEMS  --------------------------------------------------------------------------------  Gen: No weight changes, fatigue, fevers/chills, weakness  Skin: No rashes  Head/Eyes/Ears/Mouth: No headache; Normal hearing; Normal vision w/o blurriness; No sinus pain/discomfort, sore throat  Respiratory: No dyspnea, cough, wheezing, hemoptysis  CV: No chest pain, PND, orthopnea  GI: No abdominal pain, diarrhea, constipation, nausea, vomiting, melena, hematochezia  : No increased frequency, dysuria, hematuria, nocturia  MSK: No joint pain/swelling; no back pain; no edema  Neuro: No dizziness/lightheadedness, weakness, seizures, numbness, tingling  Heme: No easy bruising or bleeding  Endo: No heat/cold intolerance  Psych: No significant nervousness, anxiety, stress, depression    All other systems were reviewed and are negative, except as noted.    VITALS/PHYSICAL EXAM  --------------------------------------------------------------------------------  T(C): 37.3 (03-31-24 @ 11:06), Max: 37.3 (03-31-24 @ 11:06)  HR: 50 (03-31-24 @ 11:06) (50 - 62)  BP: 137/73 (03-31-24 @ 11:06) (118/75 - 138/83)  RR: 12 (03-31-24 @ 11:06) (12 - 18)  SpO2: 94% (03-31-24 @ 11:06) (92% - 97%)  Wt(kg): --        03-30-24 @ 07:01  -  03-31-24 @ 07:00  --------------------------------------------------------  IN: 2460 mL / OUT: 1650 mL / NET: 810 mL      Physical Exam:  	Gen: NAD  	HEENT:  supple neck, clear oropharynx  	Pulm: CTA B/L  	CV: RRR, S1S2; no rub  	Back: No spinal or CVA tenderness; no sacral edema  	Abd: +BS, soft, nontender/nondistended  	: No suprapubic tenderness  	UE: Warm, no edema  	LE: Warm, no edema  	Neuro: No focal deficit  	Psych: Normal affect and mood  	Skin: Warm    LABS/STUDIES  --------------------------------------------------------------------------------              9.1    7.86  >-----------<  326      [03-31-24 @ 06:09]              28.0     133  |  100  |  27.1  ----------------------------<  98      [03-31-24 @ 06:09]  4.8   |  22.0  |  1.52        Ca     9.4     [03-31-24 @ 06:09]      Mg     1.6     [03-31-24 @ 06:09]      Phos  3.7     [03-31-24 @ 06:09]        Creatinine Trend:  SCr 1.52 [03-31 @ 06:09]  SCr 1.69 [03-30 @ 15:17]  SCr 1.54 [03-30 @ 07:21]  SCr 1.78 [03-29 @ 06:20]  SCr 1.80 [03-28 @ 14:23]    Urinalysis - [03-31-24 @ 06:09]      Color  / Appearance  / SG  / pH       Gluc 98 / Ketone   / Bili  / Urobili        Blood  / Protein  / Leuk Est  / Nitrite       RBC  / WBC  / Hyaline  / Gran  / Sq Epi  / Non Sq Epi  / Bacteria     Urine Creatinine 27      [03-29-24 @ 12:36]  Urine Sodium 75      [03-29-24 @ 12:36]  Urine Chloride 105      [03-27-24 @ 08:15]  Urine Osmolality 265      [03-29-24 @ 12:37]    TSH 2.10      [03-26-24 @ 02:50]    HCV 0.10, Nonreact      [03-26-24 @ 02:50]

## 2024-03-31 NOTE — PROGRESS NOTE ADULT - SUBJECTIVE AND OBJECTIVE BOX
Patient seen and examined . Comfortable , denies n/v , voiding , last BM yesterday ,   pain well controlled , denies sob/chest pain .     CC : no complaints       MEDICATIONS  (STANDING):  acetaminophen     Tablet .. 975 milliGRAM(s) Oral every 6 hours  apixaban 2.5 milliGRAM(s) Oral every 12 hours  atorvastatin 20 milliGRAM(s) Oral at bedtime  cefTRIAXone Injectable. 1000 milliGRAM(s) IV Push every 24 hours  DULoxetine 30 milliGRAM(s) Oral daily  finasteride 5 milliGRAM(s) Oral daily  gabapentin 100 milliGRAM(s) Oral three times a day  influenza  Vaccine (HIGH DOSE) 0.7 milliLiter(s) IntraMuscular once  levothyroxine 25 MICROGram(s) Oral daily  lidocaine   4% Patch 3 Patch Transdermal every 24 hours  melatonin 3 milliGRAM(s) Oral at bedtime  metoprolol tartrate 25 milliGRAM(s) Oral two times a day  nicotine - 21 mG/24Hr(s) Patch 1 Patch Transdermal daily  polyethylene glycol 3350 17 Gram(s) Oral daily  senna 2 Tablet(s) Oral at bedtime  sodium chloride 0.9%. 1000 milliLiter(s) (75 mL/Hr) IV Continuous <Continuous>  sucralfate 1 Gram(s) Oral two times a day  tamsulosin 0.4 milliGRAM(s) Oral at bedtime    MEDICATIONS  (PRN):  methocarbamol 500 milliGRAM(s) Oral every 8 hours PRN Muscle Spasm  oxyCODONE    IR 5 milliGRAM(s) Oral every 4 hours PRN Severe Pain (7 - 10)  oxyCODONE    IR 2.5 milliGRAM(s) Oral every 4 hours PRN Moderate Pain (4 - 6)      LABS:                          9.1    7.86  )-----------( 326      ( 31 Mar 2024 06:09 )             28.0     03-31    133<L>  |  100  |  27.1<H>  ----------------------------<  98  4.8   |  22.0  |  1.52<H>    Ca    9.4      31 Mar 2024 06:09  Phos  3.7     03-31  Mg     1.6     03-31      Urinalysis + Microscopic Examination (03.27.24 @ 08:15)    pH Urine: 6.0   Urine Appearance: Clear   Color: Yellow   Specific Gravity: 1.010   Protein, Urine: Negative mg/dL   Glucose Qualitative, Urine: Negative mg/dL   Ketone - Urine: Negative mg/dL   Blood, Urine: Negative   Bilirubin: Negative   Urobilinogen: 1.0 mg/dL   Leukocyte Esterase Concentration: Large   Nitrite: Positive   White Blood Cell - Urine: 71 /HPF   Red Blood Cell - Urine: 0 /HPF   Bacteria: Many /HPF   Cast: 1 /LPF   Epithelial Cells: 1 /HPF    Culture - Urine (03.27.24 @ 11:05)    -  Amoxicillin/Clavulanic Acid: R >16/8   -  Ampicillin: R >16 These ampicillin results predict results for amoxicillin   -  Ampicillin/Sulbactam: I 16/8   -  Aztreonam: S <=4   -  Cefazolin: R >16   -  Cefepime: S <=2   -  Cefoxitin: S <=8   -  Ceftriaxone: S <=1   -  Ciprofloxacin: R >2   -  Ertapenem: S <=0.5   -  Gentamicin: S <=2   -  Levofloxacin: R >4   -  Meropenem: S <=1   -  Nitrofurantoin: R 64 Should not be used to treat pyelonephritis   -  Piperacillin/Tazobactam: S <=8   -  Tobramycin: S <=2   -  Trimethoprim/Sulfamethoxazole: R >2/38   Specimen Source: Clean Catch Clean Catch (Midstream)   Culture Results:   >100,000 CFU/ml Morganella morganii   Organism Identification: Morganella morganii   Organism: Morganella morganii   Method Type: [facility]        RADIOLOGY & ADDITIONAL TESTS:    < from: US Renal (03.29.24 @ 11:35) >    ACC: [id number] EXAM:  US KIDNEY(S)   ORDERED BY: OSCAR LOVE     PROCEDURE DATE:  03/29/2024          INTERPRETATION:  CLINICAL INFORMATION: Acute kidney injury. Trauma.    COMPARISON: None available.    TECHNIQUE: Sonography of the kidneys and bladder.    FINDINGS:  Technically limited evaluation secondary patient body habitus.    Right kidney: 10.2 cm. No renal mass, hydronephrosis or calculi. Upper to   mid pole cyst, 1.2 x 1.2 x 1.4 cm.    Left kidney: 10.1 cm. No renal mass, hydronephrosis or calculi.    Urinary bladder: Within normal limits. Bilateral ureteral jets visualized.    IMPRESSION:  Technically limited exam.    No hydronephrosis.        --- End of Report ---    < end of copied text >        REVIEW OF SYSTEMS:    All systems are reviewed and are negative .     Vital Signs Last 24 Hrs  T(C): 37.3 (31 Mar 2024 11:06), Max: 37.3 (31 Mar 2024 11:06)  T(F): 99.1 (31 Mar 2024 11:06), Max: 99.1 (31 Mar 2024 11:06)  HR: 50 (31 Mar 2024 11:06) (50 - 62)  BP: 137/73 (31 Mar 2024 11:06) (118/75 - 138/83)  BP(mean): --  RR: 12 (31 Mar 2024 11:06) (12 - 18)  SpO2: 94% (31 Mar 2024 11:06) (92% - 97%)    Parameters below as of 31 Mar 2024 11:06  Patient On (Oxygen Delivery Method): room air      PHYSICAL EXAM:    GENERAL: NAD, well-groomed, well-developed  HEAD:  Atraumatic, Normocephalic  EYES: EOMI, PERRLA, conjunctiva and sclera clear  NECK: Supple, No JVD, Normal thyroid  NERVOUS SYSTEM:  Alert & Oriented X3, no focal deficit  CHEST/LUNG: CTA b/l ,  no  rales, rhonchi, wheezing, or rubs  HEART: Regular rate and rhythm; No murmurs, rubs, or gallops  ABDOMEN: Soft, Nontender, Nondistended; Bowel sounds present  EXTREMITIES:  2+ Peripheral Pulses, No clubbing, cyanosis, or edema  LYMPH: No lymphadenopathy noted  SKIN: No rashes or lesions

## 2024-04-01 VITALS
DIASTOLIC BLOOD PRESSURE: 82 MMHG | SYSTOLIC BLOOD PRESSURE: 148 MMHG | TEMPERATURE: 98 F | RESPIRATION RATE: 16 BRPM | OXYGEN SATURATION: 98 % | HEART RATE: 64 BPM

## 2024-04-01 LAB
ANION GAP SERPL CALC-SCNC: 12 MMOL/L — SIGNIFICANT CHANGE UP (ref 5–17)
BUN SERPL-MCNC: 26.7 MG/DL — HIGH (ref 8–20)
CALCIUM SERPL-MCNC: 9.2 MG/DL — SIGNIFICANT CHANGE UP (ref 8.4–10.5)
CHLORIDE SERPL-SCNC: 104 MMOL/L — SIGNIFICANT CHANGE UP (ref 96–108)
CO2 SERPL-SCNC: 21 MMOL/L — LOW (ref 22–29)
CREAT SERPL-MCNC: 1.52 MG/DL — HIGH (ref 0.5–1.3)
EGFR: 47 ML/MIN/1.73M2 — LOW
GLUCOSE SERPL-MCNC: 98 MG/DL — SIGNIFICANT CHANGE UP (ref 70–99)
HCT VFR BLD CALC: 28.3 % — LOW (ref 39–50)
HGB BLD-MCNC: 9.3 G/DL — LOW (ref 13–17)
MAGNESIUM SERPL-MCNC: 1.6 MG/DL — SIGNIFICANT CHANGE UP (ref 1.6–2.6)
MCHC RBC-ENTMCNC: 28.1 PG — SIGNIFICANT CHANGE UP (ref 27–34)
MCHC RBC-ENTMCNC: 32.9 GM/DL — SIGNIFICANT CHANGE UP (ref 32–36)
MCV RBC AUTO: 85.5 FL — SIGNIFICANT CHANGE UP (ref 80–100)
PHOSPHATE SERPL-MCNC: 4.1 MG/DL — SIGNIFICANT CHANGE UP (ref 2.4–4.7)
PLATELET # BLD AUTO: 328 K/UL — SIGNIFICANT CHANGE UP (ref 150–400)
POTASSIUM SERPL-MCNC: 4.8 MMOL/L — SIGNIFICANT CHANGE UP (ref 3.5–5.3)
POTASSIUM SERPL-SCNC: 4.8 MMOL/L — SIGNIFICANT CHANGE UP (ref 3.5–5.3)
RBC # BLD: 3.31 M/UL — LOW (ref 4.2–5.8)
RBC # FLD: 15.8 % — HIGH (ref 10.3–14.5)
SODIUM SERPL-SCNC: 137 MMOL/L — SIGNIFICANT CHANGE UP (ref 135–145)
WBC # BLD: 7.6 K/UL — SIGNIFICANT CHANGE UP (ref 3.8–10.5)
WBC # FLD AUTO: 7.6 K/UL — SIGNIFICANT CHANGE UP (ref 3.8–10.5)

## 2024-04-01 RX ORDER — CEFTRIAXONE 500 MG/1
1000 INJECTION, POWDER, FOR SOLUTION INTRAMUSCULAR; INTRAVENOUS ONCE
Refills: 0 | Status: COMPLETED | OUTPATIENT
Start: 2024-04-01 | End: 2024-04-01

## 2024-04-01 RX ORDER — LIDOCAINE 4 G/100G
3 CREAM TOPICAL
Qty: 0 | Refills: 0 | DISCHARGE
Start: 2024-04-01

## 2024-04-01 RX ORDER — CEFTRIAXONE 500 MG/1
1 INJECTION, POWDER, FOR SOLUTION INTRAMUSCULAR; INTRAVENOUS
Qty: 5 | Refills: 0
Start: 2024-04-01 | End: 2024-04-05

## 2024-04-01 RX ORDER — OXYCODONE HYDROCHLORIDE 5 MG/1
1 TABLET ORAL
Qty: 0 | Refills: 0 | DISCHARGE
Start: 2024-04-01

## 2024-04-01 RX ORDER — SENNA PLUS 8.6 MG/1
2 TABLET ORAL
Qty: 0 | Refills: 0 | DISCHARGE
Start: 2024-04-01

## 2024-04-01 RX ORDER — METHOCARBAMOL 500 MG/1
1 TABLET, FILM COATED ORAL
Qty: 0 | Refills: 0 | DISCHARGE
Start: 2024-04-01

## 2024-04-01 RX ORDER — CEFTRIAXONE 500 MG/1
INJECTION, POWDER, FOR SOLUTION INTRAMUSCULAR; INTRAVENOUS
Refills: 0 | Status: DISCONTINUED | OUTPATIENT
Start: 2024-04-01 | End: 2024-04-01

## 2024-04-01 RX ORDER — ACETAMINOPHEN 500 MG
3 TABLET ORAL
Qty: 0 | Refills: 0 | DISCHARGE
Start: 2024-04-01

## 2024-04-01 RX ORDER — MAGNESIUM SULFATE 500 MG/ML
2 VIAL (ML) INJECTION ONCE
Refills: 0 | Status: COMPLETED | OUTPATIENT
Start: 2024-04-01 | End: 2024-04-01

## 2024-04-01 RX ORDER — CEFTRIAXONE 500 MG/1
1000 INJECTION, POWDER, FOR SOLUTION INTRAMUSCULAR; INTRAVENOUS EVERY 24 HOURS
Refills: 0 | Status: DISCONTINUED | OUTPATIENT
Start: 2024-04-02 | End: 2024-04-01

## 2024-04-01 RX ADMIN — Medication 1 GRAM(S): at 06:35

## 2024-04-01 RX ADMIN — CEFTRIAXONE 1000 MILLIGRAM(S): 500 INJECTION, POWDER, FOR SOLUTION INTRAMUSCULAR; INTRAVENOUS at 11:18

## 2024-04-01 RX ADMIN — Medication 975 MILLIGRAM(S): at 11:17

## 2024-04-01 RX ADMIN — Medication 25 MICROGRAM(S): at 06:35

## 2024-04-01 RX ADMIN — GABAPENTIN 100 MILLIGRAM(S): 400 CAPSULE ORAL at 13:12

## 2024-04-01 RX ADMIN — Medication 975 MILLIGRAM(S): at 07:36

## 2024-04-01 RX ADMIN — Medication 975 MILLIGRAM(S): at 12:25

## 2024-04-01 RX ADMIN — GABAPENTIN 100 MILLIGRAM(S): 400 CAPSULE ORAL at 06:36

## 2024-04-01 RX ADMIN — FINASTERIDE 5 MILLIGRAM(S): 5 TABLET, FILM COATED ORAL at 11:18

## 2024-04-01 RX ADMIN — APIXABAN 2.5 MILLIGRAM(S): 2.5 TABLET, FILM COATED ORAL at 06:36

## 2024-04-01 RX ADMIN — Medication 975 MILLIGRAM(S): at 06:36

## 2024-04-01 RX ADMIN — DULOXETINE HYDROCHLORIDE 30 MILLIGRAM(S): 30 CAPSULE, DELAYED RELEASE ORAL at 11:17

## 2024-04-01 RX ADMIN — Medication 25 GRAM(S): at 11:18

## 2024-04-01 RX ADMIN — POLYETHYLENE GLYCOL 3350 17 GRAM(S): 17 POWDER, FOR SOLUTION ORAL at 11:18

## 2024-04-01 NOTE — DISCHARGE NOTE NURSING/CASE MANAGEMENT/SOCIAL WORK - NSTOBACCONEVERSMOKERY/N_GEN_A
09/09/17 0930   Quick Adds   Type of Visit Initial PT Evaluation   Living Environment   Lives With spouse;child(aretha), dependent   Living Arrangements house   Home Accessibility bed and bath on same level;tub/shower is not walk in;stairs within home;stairs to enter home   Number of Stairs to Enter Home 2   Number of Stairs Within Home 14  (stairs to basement; no need to access)   Stair Railings at Home outside, present on right side;inside, present on left side   Transportation Available family or friend will provide   Living Environment Comment Pt lives with spouse and 11 y/o daughter in house with all needs accessible on 1st floor. Pt has shower chair and assist available prn at home.    Self-Care   Dominant Hand left   Usual Activity Tolerance poor   Current Activity Tolerance moderate   Regular Exercise no   Equipment Currently Used at Home shower chair   Activity/Exercise/Self-Care Comment Just prior to admission, pt was significantly limited d/t fatigue and tachycardia. Pt reports amb IND between rooms, with most time spent sitting up in chair vs resting in bed. Pt had assist for all ADLs, shower chair for bathing. Pt was otherwise IND with mobility.   Functional Level Prior   Ambulation 0-->independent   Transferring 0-->independent   Toileting 0-->independent   Bathing 1-->assistive equipment   Dressing 0-->independent   Eating 0-->independent   Communication 0-->understands/communicates without difficulty   Swallowing 0-->swallows foods/liquids without difficulty   Cognition 0 - no cognition issues reported   Fall history within last six months no   Which of the above functional risks had a recent onset or change? ambulation;bathing  (activity tolerance)   Prior Functional Level Comment Pt states she was IND wtih mobilty, though limited activity within the home d/t signficant fatigue. Pt had HHPT check-in tiwce, however services not yet initiated d/t tachycardia.   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 Yellow mask donned.   General Info Comments Activity orders : Ambulate   Cognitive Status Examination   Orientation orientation to person, place and time   Level of Consciousness alert   Follows Commands and Answers Questions 100% of the time   Personal Safety and Judgment intact   Memory intact   Pain Assessment   Patient Currently in Pain Yes, see Vital Sign flowsheet   Integumentary/Edema   Integumentary/Edema no deficits were identifed   Posture    Posture Forward head position;Protracted shoulders   Range of Motion (ROM)   ROM Quick Adds No deficits were identified   Strength   Strength Comments Per functional mobility assessment, pt with BLE and UE strength WFL. Endurance decreased with need for frequent rest breaks and use of HR with stairs/UE support with standing therex.   Bed Mobility   Bed Mobility Comments IND   Transfer Skills   Transfer Comments IND   Gait   Gait Comments SBA progressing to IND; slowed but steady gisella   Balance   Balance Comments Pt requires SBA with stairs and standing therex, with 1-2 UE supported for stability.   General Therapy Interventions   Planned Therapy Interventions ADL retraining;balance training;gait training;strengthening;transfer " "training;risk factor education;home program guidelines;progressive activity/exercise   Clinical Impression   Criteria for Skilled Therapeutic Intervention yes, treatment indicated   PT Diagnosis Impaired functional mobility   Influenced by the following impairments decreased endurance, exercise restrictions per lab values   Functional limitations due to impairments community mobility, ADLs, stairs   Clinical Presentation Stable/Uncomplicated   Clinical Presentation Rationale Pt progressing well with HR stabilized; tolerating increased bouts of activity and agreeable with recommendations for gradual progression   Clinical Decision Making (Complexity) Low complexity   Therapy Frequency` 3 times/week   Predicted Duration of Therapy Intervention (days/wks) 1 week   Anticipated Discharge Disposition Home with Home Therapy;Home with Outpatient Therapy   Risk & Benefits of therapy have been explained Yes   Patient, Family & other staff in agreement with plan of care Yes   Clinical Impression Comments Pt is at risk for deconditioning with prolonged immobility and report of increased fatigue over past ~1 week; would benefit from PT to address energy conservation strategies, review activity recommendations for safe endurance progresion, and POC at disch.   [facility] ([employer] TM \"6 Clicks\"   2016, Trustees of [facility], under license to [employer].  All rights reserved.   6 Clicks Short Forms Basic Mobility Inpatient Short Form   [facility] [employer]  \"6 Clicks\" V.2 Basic Mobility Inpatient Short Form   1. Turning from your back to your side while in a flat bed without using bedrails? 4 - None   2. Moving from lying on your back to sitting on the side of a flat bed without using bedrails? 4 - None   3. Moving to and from a bed to a chair (including a wheelchair)? 4 - None   4. Standing up from a chair using your arms (e.g., wheelchair, or bedside chair)? 4 - None   5. To walk in hospital room? 4 - None   6. " Climbing 3-5 steps with a railing? 4 - None   Basic Mobility Raw Score (Score out of 24.Lower scores equate to lower levels of function) 24      Yes

## 2024-04-01 NOTE — DISCHARGE NOTE NURSING/CASE MANAGEMENT/SOCIAL WORK - NSDCPEWEB_GEN_ALL_CORE
[facility] for Tobacco Control website --- [email or web address] website --- [email or web address]

## 2024-04-01 NOTE — PROGRESS NOTE ADULT - SUBJECTIVE AND OBJECTIVE BOX
INTERVAL HPI/OVERNIGHT EVENTS: no complaints this am, pain well controlled on current meds      MEDICATIONS  (STANDING):  acetaminophen     Tablet .. 975 milliGRAM(s) Oral every 6 hours  apixaban 2.5 milliGRAM(s) Oral every 12 hours  atorvastatin 20 milliGRAM(s) Oral at bedtime  cefTRIAXone Injectable.      DULoxetine 30 milliGRAM(s) Oral daily  finasteride 5 milliGRAM(s) Oral daily  gabapentin 100 milliGRAM(s) Oral three times a day  influenza  Vaccine (HIGH DOSE) 0.7 milliLiter(s) IntraMuscular once  levothyroxine 25 MICROGram(s) Oral daily  lidocaine   4% Patch 3 Patch Transdermal every 24 hours  melatonin 3 milliGRAM(s) Oral at bedtime  metoprolol tartrate 25 milliGRAM(s) Oral two times a day  nicotine - 21 mG/24Hr(s) Patch 1 Patch Transdermal daily  polyethylene glycol 3350 17 Gram(s) Oral daily  senna 2 Tablet(s) Oral at bedtime  sodium chloride 0.9%. 1000 milliLiter(s) (75 mL/Hr) IV Continuous <Continuous>  sucralfate 1 Gram(s) Oral two times a day  tamsulosin 0.4 milliGRAM(s) Oral at bedtime    MEDICATIONS  (PRN):  methocarbamol 500 milliGRAM(s) Oral every 8 hours PRN Muscle Spasm  oxyCODONE    IR 5 milliGRAM(s) Oral every 4 hours PRN Severe Pain (7 - 10)  oxyCODONE    IR 2.5 milliGRAM(s) Oral every 4 hours PRN Moderate Pain (4 - 6)      Vital Signs Last 24 Hrs  T(C): 36.8 (01 Apr 2024 12:00), Max: 37 (31 Mar 2024 16:30)  T(F): 98.2 (01 Apr 2024 12:00), Max: 98.6 (31 Mar 2024 16:30)  HR: 58 (01 Apr 2024 12:00) (56 - 64)  BP: 144/88 (01 Apr 2024 12:00) (104/63 - 155/90)  BP(mean): --  RR: 16 (01 Apr 2024 12:00) (16 - 18)  SpO2: 96% (01 Apr 2024 12:00) (96% - 97%)    Parameters below as of 01 Apr 2024 12:00  Patient On (Oxygen Delivery Method): room air        PHYSICAL EXAM:      Constitutional: NAD    Respiratory: no accessory muscle use or conversational dyspnea    Cardiovascular: RRR    Gastrointestinal: soft, NT/ND    Extremities: SMALLWOOD          I&O's Detail    31 Mar 2024 07:01  -  01 Apr 2024 07:00  --------------------------------------------------------  IN:    Oral Fluid: 120 mL  Total IN: 120 mL    OUT:    Voided (mL): 1150 mL  Total OUT: 1150 mL    Total NET: -1030 mL      01 Apr 2024 07:01  -  01 Apr 2024 13:21  --------------------------------------------------------  IN:    Oral Fluid: 240 mL    sodium chloride 0.9%: 525 mL  Total IN: 765 mL    OUT:  Total OUT: 0 mL    Total NET: 765 mL          LABS:                        9.3    7.60  )-----------( 328      ( 01 Apr 2024 05:15 )             28.3     04-01    137  |  104  |  26.7<H>  ----------------------------<  98  4.8   |  21.0<L>  |  1.52<H>    Ca    9.2      01 Apr 2024 05:15  Phos  4.1     04-01  Mg     1.6     04-01        Urinalysis Basic - ( 01 Apr 2024 05:15 )    Color: x / Appearance: x / SG: x / pH: x  Gluc: 98 mg/dL / Ketone: x  / Bili: x / Urobili: x   Blood: x / Protein: x / Nitrite: x   Leuk Esterase: x / RBC: x / WBC x   Sq Epi: x / Non Sq Epi: x / Bacteria: x        RADIOLOGY & ADDITIONAL STUDIES:

## 2024-04-01 NOTE — PROGRESS NOTE ADULT - NS ATTEND AMEND GEN_ALL_CORE FT
Above assessment noted.  The patient was seen and examined by myself with the surgical PA.  The patient is without complaints and no new events overnight.  Cr down to 1.5.  Will need to continue with IV hydration, trend Cr, continue with IV antibiotics for urinary tract infection.
Above assessment noted.  The patient was seen and examined by myself with the surgical PA.  The patient is without pain, shortness of breath of chest pain. He states he wants to go home.  The Cr is down to 1.5 from 1.8.  Will continue with IV hydration, repeat Cr if continues to improve then will plan for discharge home.
78 year old male with pmhx of BPH, hypothyroidism, and CAD s/p CABG/stent presenting s/p fall with rib fractures. Hospital course notable for   - SADA     #Rib fractures   - OOB/IS   - Pain well controlled   - Multimodal regimen   - DVT ppx on eliquis  - D/c planning over the weekend     #SADA due to ATN   - FENa intrinsic   - US no hydro   - Cr appears to be stabilizing   - D/c IVF. Encourage oral intake     #BPH   - c/w finasteride , flomax     #CAD s/p CABG/Stent   - c/w Eliquis
Patient seen and examined on AM rounds with surgical team  No new complaints  Doing well  Asking to go home  Pain with good control  vitals stable  nontoxic appearing  vitals stable, afebrile    - ID notes appreciated - midline IV catheter for antibiotics placed this AM  - ok for discharge from trauma standpoint

## 2024-04-01 NOTE — PROGRESS NOTE ADULT - SUBJECTIVE AND OBJECTIVE BOX
Patient seen and examined . Comfortable , no complaints, denies pain , denies n/v , voiding , had BM yesterday ,   tolerating diet . Wants to go home . LUE midline placed on 3/31.    CC : no complaints         MEDICATIONS  (STANDING):  acetaminophen     Tablet .. 975 milliGRAM(s) Oral every 6 hours  apixaban 2.5 milliGRAM(s) Oral every 12 hours  atorvastatin 20 milliGRAM(s) Oral at bedtime  cefTRIAXone Injectable.      cefTRIAXone Injectable. 1000 milliGRAM(s) IV Push once  DULoxetine 30 milliGRAM(s) Oral daily  finasteride 5 milliGRAM(s) Oral daily  gabapentin 100 milliGRAM(s) Oral three times a day  influenza  Vaccine (HIGH DOSE) 0.7 milliLiter(s) IntraMuscular once  levothyroxine 25 MICROGram(s) Oral daily  lidocaine   4% Patch 3 Patch Transdermal every 24 hours  magnesium sulfate  IVPB 2 Gram(s) IV Intermittent once  melatonin 3 milliGRAM(s) Oral at bedtime  metoprolol tartrate 25 milliGRAM(s) Oral two times a day  nicotine - 21 mG/24Hr(s) Patch 1 Patch Transdermal daily  polyethylene glycol 3350 17 Gram(s) Oral daily  senna 2 Tablet(s) Oral at bedtime  sodium chloride 0.9%. 1000 milliLiter(s) (75 mL/Hr) IV Continuous <Continuous>  sucralfate 1 Gram(s) Oral two times a day  tamsulosin 0.4 milliGRAM(s) Oral at bedtime    MEDICATIONS  (PRN):  methocarbamol 500 milliGRAM(s) Oral every 8 hours PRN Muscle Spasm  oxyCODONE    IR 2.5 milliGRAM(s) Oral every 4 hours PRN Moderate Pain (4 - 6)  oxyCODONE    IR 5 milliGRAM(s) Oral every 4 hours PRN Severe Pain (7 - 10)      LABS:                          9.3    7.60  )-----------( 328      ( 01 Apr 2024 05:15 )             28.3     04-01    137  |  104  |  26.7<H>  ----------------------------<  98  4.8   |  21.0<L>  |  1.52<H>    Ca    9.2      01 Apr 2024 05:15  Phos  4.1     04-01  Mg     1.6     04-01      RADIOLOGY & ADDITIONAL TESTS:      REVIEW OF SYSTEMS:  All systems are reviewed and are negative .    Vital Signs Last 24 Hrs  T(C): 36.6 (01 Apr 2024 07:56), Max: 37.3 (31 Mar 2024 11:06)  T(F): 97.9 (01 Apr 2024 07:56), Max: 99.1 (31 Mar 2024 11:06)  HR: 56 (01 Apr 2024 07:56) (50 - 64)  BP: 144/77 (01 Apr 2024 07:56) (104/63 - 155/90)  BP(mean): --  RR: 18 (01 Apr 2024 07:56) (12 - 18)  SpO2: 96% (01 Apr 2024 07:56) (94% - 97%)    Parameters below as of 01 Apr 2024 07:56  Patient On (Oxygen Delivery Method): room air      PHYSICAL EXAM:    GENERAL: NAD, well-groomed, well-developed  HEAD:  Atraumatic, Normocephalic  EYES: EOMI, PERRLA, conjunctiva and sclera clear  NECK: Supple, No JVD, Normal thyroid  NERVOUS SYSTEM:  Alert & Oriented X3, no focal deficit  CHEST/LUNG: CTA b/l ,  no  rales, rhonchi, wheezing, or rubs  HEART: Regular rate and rhythm; No murmurs, rubs, or gallops  ABDOMEN: Soft, Nontender, Nondistended; Bowel sounds present  EXTREMITIES:  2+ Peripheral Pulses, No clubbing, cyanosis, or edema  LYMPH: No lymphadenopathy noted  SKIN: No rashes or lesions

## 2024-04-01 NOTE — DISCHARGE NOTE NURSING/CASE MANAGEMENT/SOCIAL WORK - PATIENT PORTAL LINK FT
You can access the FollowMyHealth Patient Portal offered by Health system by registering at the following website: [email or web address]. By joining [employer]’s FollowMyHealth portal, you will also be able to view your health information using other applications (apps) compatible with our system.

## 2024-04-01 NOTE — PROGRESS NOTE ADULT - PROVIDER SPECIALTY LIST ADULT
SICU
Hospitalist
Trauma Surgery
Hospitalist
SICU
Trauma Surgery
Hospitalist
Hospitalist

## 2024-04-01 NOTE — PROGRESS NOTE ADULT - ASSESSMENT
78 M with Alcohol use disorder, Benign essential HTN, BPH, Hypothyroidism,  Smoker,  presents as transfer from [facility] s/p fall with L 4-10 rib fractures. Patient fell out of bed in morning and hit his head on the night stand, no LOC. He landed on his L side but was able to stand and ambulate afterwards, his imaging done showed Acute minimally displaced fractures of the left lateral seventh, eighth, ninth, and 10th ribs. subacute displaced fracture left 11th posterolateral rib, CT of the head and C spine showed no acute findings, he was sent to [facility] for further care.     Plan:     Fall:   Fall precautions  OT and OT eval   Orthostatic vitals    vitamin b12 is ok   TSH level is ok   DVT prophylaxis as per Primary team   continue cardiac monitoring     left sided multiple rib fractures:   Multimodal pain regimen including xylenol, Robaxin lidocaine and hydromorphone  if not helping might need pain management and nerves block   O2 monitoring   IS   Serial imaging to make sure no hemo or Pneumothorax  has sub acute fracture of the 11th rib might be from previous fall    Hx of Alcohol Abuse:   Will suggest [facility] protocol   thiamine, folic acid and multivitamin supplement  no withdrawals during the course.       Hx of smoking:   counselling for cessation, he does not wanted to stop it.     Hx of HLD: continue with atorvastatin 20 milliGRAM(s) Oral at bedtime    Hx of Anxiety: Will continue with DULoxetine 30 milliGRAM(s) Oral daily    Hx of BPH: will continue with finasteride 5mg daily and tamsulosin 0.4mg at bedtime, will monitor for urine retention    Hx of Hypothyroidism: Will continue with levothyroxine 25 MICROGram(s) Oral daily.     Hx of HTN: will continue with metoprolol tartrate 25 milliGRAM(s) Oral two times a day    Prediabetes: Hb a1c is 6, counselling for low carb diet.     Anemia: Not sure if acute, no previous Hb level, current Hb is ~10, would monitor CBC, if trends down then would transfuse.     Hyponatremia: Na coming up to 133 from 131, would continue with IV fluids, will monitor BMP.     SADA: will give IV fluids, will monitor BMP, I/O, avoid nephrotoxic agents, would do bladder scan to r/o if he is retaining     Possible UTI: UA with white cells, will get Urine cultures, he is has been started on Bactrim     Identification of Seniors at Risk:                                                                                                                                       Before the event that brought you to the hospital, did you need someone to help you on a regular basis?   No  In the 24 hours before your injury, have you needed more help than usual?                                                No          Have you been hospitalized for one or more nights during the past six months?                                         No          In general, do you have serious problems with your vision that cannot be corrected with glasses?                No   In general, do you have serious problems with your memory?                                                                    No               Do you take six or more different medications every day?                                                                          Yes                    A positive screen is an aswer of yes to 2 or more - Total:  Screening is negative.                                              
78 M with Alcohol use disorder, Benign essential HTN, BPH, Hypothyroidism,  Smoker,  presents as transfer from [facility] s/p fall with L 4-10 rib fractures. Patient fell out of bed in morning and hit his head on the night stand, no LOC. He landed on his L side but was able to stand and ambulate afterwards, his imaging done showed Acute minimally displaced fractures of the left lateral seventh, eighth, ninth, and 10th ribs. subacute displaced fracture left 11th posterolateral rib, CT of the head and C spine showed no acute findings, he was sent to [facility] for further care.     Plan:     left sided multiple rib fractures- after fall   Multimodal pain regimen   IS     Hx of Alcohol Abuse:   Consider thiamine, folic acid and multivitamin supplement  no withdrawals during the course.       Hx of smoking:   counselling for cessation, he does not wanted to stop it.     Hx of HLD: continue with atorvastatin 20 milliGRAM(s) Oral at bedtime    Hx of Anxiety: Will continue with DULoxetine 30 milliGRAM(s) Oral daily    Hx of BPH: will continue with finasteride 5mg daily and tamsulosin 0.4mg at bedtime, will monitor for urine retention    Hx of Hypothyroidism: Will continue with levothyroxine 25 MICROGram(s) Oral daily.     Hx of HTN: will continue with metoprolol tartrate 25 milliGRAM(s) Oral two times a day    Prediabetes: Hb a1c is 6, counselling for low carb diet.     Anemia: Not sure if acute, no previous Hb level, current Hb is ~10,   consider to check anemia w/u . May f/u with PMD as on outpatient for further anemia w/u as on outpatient     Hyponatremia: resolved with ivf     SADA: Cr picked up -1.69, yesterday  1.52, today same - likely due to bactrim / NSAID's.   Renal noted and appreciated . On NS at 75 ml/hr.        UTI: culture noted - >100,000 CFU/ml Morganella morganii   he is has been started on Bactrim, not sensitive . Sensitive to Rocephin- started 1 gm iv daily on 3/30/24,   ID eval requested by primary team - ID noted and appreciated , continue Abx as per ID - Rocephin 1 gm daily x 7 days , midline placed .     DVT prophylaxis  - on Eliquis   If patient cleared by renal may be discharged home with recommendations to follow up as on outpatient .   
78 M with Alcohol use disorder, Benign essential HTN, BPH, Hypothyroidism,  Smoker,  presents as transfer from [facility] s/p fall with L 4-10 rib fractures. Patient fell out of bed in morning and hit his head on the night stand, no LOC. He landed on his L side but was able to stand and ambulate afterwards, his imaging done showed Acute minimally displaced fractures of the left lateral seventh, eighth, ninth, and 10th ribs. subacute displaced fracture left 11th posterolateral rib, CT of the head and C spine showed no acute findings, he was sent to [facility] for further care.     Plan:     Fall:   Fall precautions  OT and OT eval   Orthostatic vitals    vitamin b12 is ok   TSH level is ok   DVT prophylaxis as per Primary team   continue cardiac monitoring     left sided multiple rib fractures:   Multimodal pain regimen including xylenol, Robaxin lidocaine and hydromorphone  if not helping might need pain management and nerves block   O2 monitoring   IS   Serial imaging to make sure no hemo or Pneumothorax  has sub acute fracture of the 11th rib might be from previous fall    Hx of Alcohol Abuse:   Will suggest [facility] protocol   thiamine, folic acid and multivitamin supplement      Hx of smoking:   counselling for cessation, he does not wanted to stop it.     Hx of HLD: continue with atorvastatin 20 milliGRAM(s) Oral at bedtime    Hx of Anxiety: Will continue with DULoxetine 30 milliGRAM(s) Oral daily    Hx of BPH: will continue with finasteride 5 milliGRAM(s) Oral daily and tamsulosin 0.4 milliGRAM(s) Oral at bedtime, will monitor for urine retention    Hx of Hypothyroidism: Will continue with levothyroxine 25 MICROGram(s) Oral daily    Hx of HTN: will continue with metoprolol tartrate 25 milliGRAM(s) Oral two times a day    Prediabetes: Hb a1c is 6, counselling for low carb diet     Anemia: Not sure if acute, no previous Hb level, current Hb is ~10, would monitor CBC, if trends down then would transfuse.     Hyponatremia: Na came down from 133 to 131, he looks dry, would give IV fluids, will monitor BMP.     SADA: will give IV fluids, will monitor BMP, I/O, avoid nephrotoxic agents.     Possible UTI: UA with white cells, will get Urine cultures, he is has been started on Bactrim     Identification of Seniors at Risk:                                                                                                                                       Before the event that brought you to the hospital, did you need someone to help you on a regular basis?   No  In the 24 hours before your injury, have you needed more help than usual?                                                No          Have you been hospitalized for one or more nights during the past six months?                                         No          In general, do you have serious problems with your vision that cannot be corrected with glasses?                No   In general, do you have serious problems with your memory?                                                                    No               Do you take six or more different medications every day?                                                                          Yes                    A positive screen is an aswer of yes to 2 or more - Total:  Screening is negative.                                              
78 M with Alcohol use disorder, Benign essential HTN, BPH, Hypothyroidism,  Smoker,  presents as transfer from [facility] s/p fall with L 4-10 rib fractures. Patient fell out of bed in morning and hit his head on the night stand, no LOC. He landed on his L side but was able to stand and ambulate afterwards, his imaging done showed Acute minimally displaced fractures of the left lateral seventh, eighth, ninth, and 10th ribs. subacute displaced fracture left 11th posterolateral rib, CT of the head and C spine showed no acute findings, he was sent to [facility] for further care.     Plan:     Fall:   Fall precautions  OT and OT eval   Orthostatic vitals    vitamin b12 is ok   TSH level is ok   DVT prophylaxis as per Primary team   continue cardiac monitoring     left sided multiple rib fractures:   Multimodal pain regimen including xylenol, Robaxin lidocaine and hydromorphone  if not helping might need pain management and nerves block   O2 monitoring   IS   Serial imaging to make sure no hemo or Pneumothorax  has sub acute fracture of the 11th rib might be from previous fall    Hx of Alcohol Abuse:   Will suggest [facility] protocol   thiamine, folic acid and multivitamin supplement  no withdrawals during the course.       Hx of smoking:   counselling for cessation, he does not wanted to stop it.     Hx of HLD: continue with atorvastatin 20 milliGRAM(s) Oral at bedtime    Hx of Anxiety: Will continue with DULoxetine 30 milliGRAM(s) Oral daily    Hx of BPH: will continue with finasteride 5mg daily and tamsulosin 0.4mg at bedtime, will monitor for urine retention    Hx of Hypothyroidism: Will continue with levothyroxine 25 MICROGram(s) Oral daily.     Hx of HTN: will continue with metoprolol tartrate 25 milliGRAM(s) Oral two times a day    Prediabetes: Hb a1c is 6, counselling for low carb diet.     Anemia: Not sure if acute, no previous Hb level, current Hb is ~10, would monitor CBC, if trends down then would transfuse.     Hyponatremia: Na coming up to 133 from 131, would continue with IV fluids, will monitor BMP.     SADA: will give IV fluids, will monitor BMP, I/O, avoid nephrotoxic agents, would do bladder scan to r/o if he is retaining   F/U BMP in am      UTI: culture noted - >100,000 CFU/ml Morganella morganii   he is has been started on Bactrim, not sensitive . Sensitive to Rocephin- will start 1 gm iv daily .   ID eval requested by primary team - continue Abx as per ID recommendations   U/S - renal - no hydronephrosis , check bladder scan to r/o urinary retention     Identification of Seniors at Risk:                                                                                                                                       Before the event that brought you to the hospital, did you need someone to help you on a regular basis?   No  In the 24 hours before your injury, have you needed more help than usual?                                                No          Have you been hospitalized for one or more nights during the past six months?                                         No          In general, do you have serious problems with your vision that cannot be corrected with glasses?                No   In general, do you have serious problems with your memory?                                                                    No               Do you take six or more different medications every day?                                                                          Yes                    A positive screen is an aswer of yes to 2 or more - Total:  Screening is negative.     D/W Dr Barton  aware of plan .     DVT prophylaxis  - on Eliquis     Time spent reviewing the chart documentation, reviewing labs and imaging studies, evaluating the patient, discussing the plan with primary team and documenting- 78 minutes   
78 M with Alcohol use disorder, Benign essential HTN, BPH, Hypothyroidism,  Smoker,  presents as transfer from [facility] s/p fall with L 4-10 rib fractures. Patient fell out of bed in morning and hit his head on the night stand, no LOC. He landed on his L side but was able to stand and ambulate afterwards, his imaging done showed Acute minimally displaced fractures of the left lateral seventh, eighth, ninth, and 10th ribs. subacute displaced fracture left 11th posterolateral rib, CT of the head and C spine showed no acute findings, he was sent to [facility] for further care.     Plan:     Fall:   Fall precautions  OT and OT eval   Orthostatic vitals    vitamin b12 is ok   TSH level is ok   DVT prophylaxis as per Primary team   continue cardiac monitoring     left sided multiple rib fractures:   Multimodal pain regimen including xylenol, Robaxin lidocaine and hydromorphone  if not helping might need pain management and nerves block   O2 monitoring   IS   Serial imaging to make sure no hemo or Pneumothorax  has sub acute fracture of the 11th rib might be from previous fall    Hx of Alcohol Abuse:   Will suggest [facility] protocol   thiamine, folic acid and multivitamin supplement      Hx of smoking:   counselling for cessation, he does not wanted to stop it.     Hx of HLD: continue with atorvastatin 20 milliGRAM(s) Oral at bedtime    Hx of Anxiety: Will continue with DULoxetine 30 milliGRAM(s) Oral daily    Hx of BPH: will continue with finasteride 5 milliGRAM(s) Oral daily and tamsulosin 0.4 milliGRAM(s) Oral at bedtime, will monitor for urine retention    Hx of Hypothyroidism: Will continue with levothyroxine 25 MICROGram(s) Oral daily    Hx of HTN: will continue with metoprolol tartrate 25 milliGRAM(s) Oral two times a day    Prediabetes: Hb a1c is 6, counselling for low carb diet     Anemia: Not sure if acute, no previous Hb level, current Hb is ~10, would monitor CBC, if trends down then would transfuse.     Hyponatremia: Na is ~133 and stable, will monitor BMP.     Possible UTI: UA with white cells, will get Urine cultures, he is has been started on Bactrim     Identification of Seniors at Risk:                                                                                                                                       Before the event that brought you to the hospital, did you need someone to help you on a regular basis?   No  In the 24 hours before your injury, have you needed more help than usual?                                                No          Have you been hospitalized for one or more nights during the past six months?                                         No          In general, do you have serious problems with your vision that cannot be corrected with glasses?                No   In general, do you have serious problems with your memory?                                                                    No               Do you take six or more different medications every day?                                                                          Yes                    A positive screen is an aswer of yes to 2 or more - Total:  Screening is negative.     Medicine team is signing off, please call as needed.                                            
ASSESSMENT: 78y male presenting as a transfer to [facility] secondary to mechanical fall with subsequent left sided rib fractures.      PLAN:      Neuro:    - Multimodal pain regimen including tylenol, robaxin, lidocaine patches   - Delirium precautions   - Optimize sleep/wake cycle            CV:    - Maintain MAP > 65   - Continue non-invasive hemodynamic monitoring           Pulm:    - Maintain sPO2 >92%   - Pulmonary toilet, IS, OOBTC   - follow up AM CXR   - PIC protocol          GI/Nutrition:    - Maintain DASH Diet    - Bowel regimen   - Monitor bowel movements           /Renal:    - Monitor kidney fxn   - Monitor UOP   - Replete electrolytes PRN (Mg >2, Phos >3, K >4)   - Voiding spontaneously           Endo:   - Monitor blood glucose   - Maintain euglycemia, 140-180   - Continue home Synthroid           Heme/DVT Prophylaxis:   - SCDs   - Monitor H/H  - Maintain SQH due to Cr       Skin:  Repositioning for DTI prevention while in bed       Lines: - Peripheral IV's       - Continue early mobility study/ rib fracture study       CODE STATUS: Full Code       
78 M with Alcohol use disorder, Benign essential HTN, BPH, Hypothyroidism,  Smoker,  presents as transfer from [facility] s/p fall with L 4-10 rib fractures. Patient fell out of bed in morning and hit his head on the night stand, no LOC. He landed on his L side but was able to stand and ambulate afterwards, his imaging done showed Acute minimally displaced fractures of the left lateral seventh, eighth, ninth, and 10th ribs. subacute displaced fracture left 11th posterolateral rib, CT of the head and C spine showed no acute findings, he was sent to [facility] for further care.     Plan:     Fall:   Fall precautions  OT and OT eval   Orthostatic vitals    vitamin b12 is ok   TSH level is ok   DVT prophylaxis as per Primary team   continue cardiac monitoring     left sided multiple rib fractures:   Multimodal pain regimen   IS     Hx of Alcohol Abuse:   Will suggest [facility] protocol   Consider thiamine, folic acid and multivitamin supplement  no withdrawals during the course.       Hx of smoking:   counselling for cessation, he does not wanted to stop it.     Hx of HLD: continue with atorvastatin 20 milliGRAM(s) Oral at bedtime    Hx of Anxiety: Will continue with DULoxetine 30 milliGRAM(s) Oral daily    Hx of BPH: will continue with finasteride 5mg daily and tamsulosin 0.4mg at bedtime, will monitor for urine retention    Hx of Hypothyroidism: Will continue with levothyroxine 25 MICROGram(s) Oral daily.     Hx of HTN: will continue with metoprolol tartrate 25 milliGRAM(s) Oral two times a day    Prediabetes: Hb a1c is 6, counselling for low carb diet.     Anemia: Not sure if acute, no previous Hb level, current Hb is ~10,   consider to check anemia w/u . May f/u with PMD as on outpatient for further anemia w/u as on outpatient     Hyponatremia: Na coming up to 133 from 131, would continue with IV fluids, will monitor BMP.     SADA: cR yesterday 1.69, today 1.52, consider to increase ivf to 100 ml   F/U BMP in am . As per surgery team renal eval requested , avoid nephrotoxic medications, U/S - no hydronephrosis .   Bladder scan r/o urinary retention      UTI: culture noted - >100,000 CFU/ml Morganella morganii   he is has been started on Bactrim, not sensitive . Sensitive to Rocephin- started 1 gm iv daily on 3/30/24,   ID eval requested by primary team - ID noted and appreciated , continue Abx as per ID .    Identification of Seniors at Risk:                                                                                                                                       Before the event that brought you to the hospital, did you need someone to help you on a regular basis?   No  In the 24 hours before your injury, have you needed more help than usual?                                                No          Have you been hospitalized for one or more nights during the past six months?                                         No          In general, do you have serious problems with your vision that cannot be corrected with glasses?                No   In general, do you have serious problems with your memory?                                                                    No               Do you take six or more different medications every day?                                                                          Yes                    A positive screen is an aswer of yes to 2 or more - Total:  Screening is negative.     D/W Dr Barton  aware of plan .     DVT prophylaxis  - on Eliquis     Time spent reviewing the chart documentation, reviewing labs and imaging studies, evaluating the patient, discussing the plan with primary team and documenting- 76 minutes   If renal function better in am - may discharge home and f/u with renal as on outpatient . 
78M s/p fall out of bed with multiple rib fractures.  Admission c/w finding of MDR UTI requiring IV abx    - Midline placed today  - need Rocephin until 4/5  - continue current pain regimen  - medically stable for discharge if Abx can be continued as ordered at his facility
ASSESSMENT: 78y male presenting as a transfer to [facility] secondary to mechanical fall to which he endorsed + head strike, denies LOC.     Admitting Dx: Left Rib 7-10 Displaced Fracture  Complicated By: Pain   PMHx: HTN, Hypothyroid, BPH, Smoking    PLAN:     Neuro:   - Multimodal pain regimen including tylenol, robaxin, lidocaine and hydromorphone  - Delirium precautions  - Continue to follow up possible hemothorax  - Continue to trend PIC score / protocol   - Optimize sleep/wake cycle     CV:   - Maintain MAP > 65  - Continue non-invasive hemodynamic monitoring      Pulm:   - Maintain sPO2 >92%  - Pulmonary toilet, IS, OOBTC    GI/Nutrition:   - Maintain DASH Diet   - Bowel regimen  - Monitor bowel movements    /Renal:   - Monitor kidney fxn  - Monitor UOP  - Replete electrolytes PRN (Mg >2, Phos >3, K >4)  - Voiding spontaneously    Endo:  - Monitor blood glucose  - Maintain euglycemia, 140-180  - Maintain Synthroid    Heme/DVT Prophylaxis:  - SCDs  - Monitor H/H  - Maintain SQH due to Cr  - Hold home eliquis due to concerns for L Hemothorax    Skin:  - Repositioning for DTI prevention while in bed    Lines:  - Peripheral IV's    Dispo:  - Continue early mobility study/ rib fracture study  - Maintain multi-modal pain control  - Care per SICU    CODE STATUS: Full Code
77 yo M being managed for rib fxs, chronic hyponatremia, and UTI, downgraded from ICU yesterday.  Currently stable.  Rising creatinine levels today with suspicion for dehydration.    PLAN  - IV fluid bolus for rising Cr  - Bactrim x 5d for UTI  - For rib fxs: pic protocol, multi-modal pain regimen, OOB, encourage ambulation. ***OPIATE ARM OF RIB FRACTURE STUDY***  - PT rec home w/ RW and home PT  - Trend Na, SICU team decided to not fluid restrict due to apparent chronicity  - Dispo: pending mgmt of Cr and electrolytes

## 2024-04-01 NOTE — PROGRESS NOTE ADULT - REASON FOR ADMISSION
Rib fracture

## 2024-05-20 NOTE — PHARMACOTHERAPY INTERVENTION NOTE - REASON FOR NOTE
Dottie returned call, states can not do 05-31-24,   rescheduled to 06-18-24, arrive 06-17-24 @ 8:00pm,   date, time and instructions reviewed and emailed to nusrat Smalls at admitting notified   Bedside care participation

## 2025-02-09 NOTE — DISCHARGE NOTE NURSING/CASE MANAGEMENT/SOCIAL WORK - NSDCPEFALRISK_GEN_ALL_CORE
For information on Fall & Injury Prevention, visit: [email or web address] OR  [email or web address] OR  https://www.cdc.gov/steadi/patient.html
Initial (On Arrival)

## 2025-03-17 ENCOUNTER — EMERGENCY (EMERGENCY)
Facility: HOSPITAL | Age: 80
LOS: 1 days | Discharge: HOME | End: 2025-03-17
Attending: STUDENT IN AN ORGANIZED HEALTH CARE EDUCATION/TRAINING PROGRAM | Admitting: STUDENT IN AN ORGANIZED HEALTH CARE EDUCATION/TRAINING PROGRAM
Payer: MEDICARE

## 2025-03-17 VITALS
DIASTOLIC BLOOD PRESSURE: 84 MMHG | OXYGEN SATURATION: 97 % | RESPIRATION RATE: 18 BRPM | WEIGHT: 205.91 LBS | SYSTOLIC BLOOD PRESSURE: 140 MMHG | HEART RATE: 59 BPM | TEMPERATURE: 98 F | HEIGHT: 68 IN

## 2025-03-17 DIAGNOSIS — Z95.5 PRESENCE OF CORONARY ANGIOPLASTY IMPLANT AND GRAFT: Chronic | ICD-10-CM

## 2025-03-17 DIAGNOSIS — Z95.1 PRESENCE OF AORTOCORONARY BYPASS GRAFT: Chronic | ICD-10-CM

## 2025-03-17 PROCEDURE — 73564 X-RAY EXAM KNEE 4 OR MORE: CPT

## 2025-03-17 PROCEDURE — 73564 X-RAY EXAM KNEE 4 OR MORE: CPT | Mod: 26,RT

## 2025-03-17 PROCEDURE — 99283 EMERGENCY DEPT VISIT LOW MDM: CPT | Mod: 25

## 2025-03-17 PROCEDURE — 99284 EMERGENCY DEPT VISIT MOD MDM: CPT

## 2025-03-17 RX ORDER — IBUPROFEN 200 MG
600 TABLET ORAL ONCE
Refills: 0 | Status: COMPLETED | OUTPATIENT
Start: 2025-03-17 | End: 2025-03-17

## 2025-03-17 RX ORDER — ACETAMINOPHEN 500 MG/5ML
650 LIQUID (ML) ORAL ONCE
Refills: 0 | Status: COMPLETED | OUTPATIENT
Start: 2025-03-17 | End: 2025-03-17

## 2025-03-17 RX ADMIN — Medication 600 MILLIGRAM(S): at 21:16

## 2025-03-17 RX ADMIN — Medication 650 MILLIGRAM(S): at 21:16

## 2025-03-17 NOTE — ED PROVIDER NOTE - PROGRESS NOTE DETAILS
pt does not want the duplex. states pain is gone and he wants to go back to [facility].  arranging for ems transport back. I tried to call [facility] to give update but no one picked up the phone

## 2025-03-17 NOTE — ED PROVIDER NOTE - PATIENT PORTAL LINK FT
You can access the FollowMyHealth Patient Portal offered by [facility] by registering at the following website: [email or web address]. By joining [facility]’s FollowMyHealth portal, you will also be able to view your health information using other applications (apps) compatible with our system.

## 2025-03-17 NOTE — ED PROVIDER NOTE - CLINICAL SUMMARY MEDICAL DECISION MAKING FREE TEXT BOX
79M PMH CAD alcohol abuse anemia A-fib on Eliquis CHF hyperlipidemia PE tobacco use BIBEMS from [facility] for intermittent anterior R knee pain with mild swelling that occurs with ambulation. Pt uses walker at baseline. Denies any falls, fevers, chest pain, SOB. Pt states pain is currently 2/10. When he takes tylenol pain goes away    Gen: NAD, non-toxic appearing, awake alert   HEENT: normal conjunctiva, oral mucosa moist  Lung: CTAB, no respiratory distress, no wheezes/rhonchi/rales B/L, speaking in full sentences  CV: RRR  Abd: soft, NT, ND, no guarding, no rigidity, no rebound tenderness  MSK: no visible deformities, ROM normal in UE/LE, able to range R knee joint actively and passively w/o pain  Neuro: No focal sensory or motor deficits, 2+ DP pulse RLE, slight R knee effusion  Skin: Warm, well perfused, no leg swelling distally to RLE    DDx includes but not limited to: likely OA related pain. Low concern for septic joint, fx, dislocation. Will also eval for DVT although less likely. Low cocnern for PE as pt has no cardiovascular complaints  Plan: duplex, xr, pain meds, reassess symptoms    See Progress Notes for further updates on ED Course

## 2025-03-17 NOTE — ED PROVIDER NOTE - NSFOLLOWUPINSTRUCTIONS_ED_ALL_ED_FT
Please take over the counter pain medications such as Motrin (600mg every 6 hours) and Tylenol (650mg every 4 hours) for pain     Follow up with your primary care doctor in 2-3 days for further evaluation of your symptoms    Return to the Emergency Department if you have any new or worsening symptoms

## 2025-03-18 VITALS
RESPIRATION RATE: 18 BRPM | OXYGEN SATURATION: 98 % | SYSTOLIC BLOOD PRESSURE: 149 MMHG | TEMPERATURE: 97 F | HEART RATE: 52 BPM | DIASTOLIC BLOOD PRESSURE: 90 MMHG

## 2025-03-18 PROBLEM — F10.10 ALCOHOL ABUSE, UNCOMPLICATED: Chronic | Status: ACTIVE | Noted: 2024-03-25

## 2025-03-18 PROBLEM — I10 ESSENTIAL (PRIMARY) HYPERTENSION: Chronic | Status: ACTIVE | Noted: 2024-03-25

## 2025-03-18 PROBLEM — F17.200 NICOTINE DEPENDENCE, UNSPECIFIED, UNCOMPLICATED: Chronic | Status: ACTIVE | Noted: 2024-03-25

## 2025-03-18 PROBLEM — E03.9 HYPOTHYROIDISM, UNSPECIFIED: Chronic | Status: ACTIVE | Noted: 2024-03-25

## 2025-03-18 PROBLEM — N40.0 BENIGN PROSTATIC HYPERPLASIA WITHOUT LOWER URINARY TRACT SYMPTOMS: Chronic | Status: ACTIVE | Noted: 2024-03-25

## 2025-03-18 NOTE — ED ADULT NURSE NOTE - NSFALLCONCLUSION_ED_ALL_ED
Assessment/Plan:  I am going to give him antibiotics for 5 days for infected cyst   I am also going to schedule him for elective excision of the remnant cyst   No problem-specific Assessment & Plan notes found for this encounter  Diagnoses and all orders for this visit:    Infected sebaceous cyst    Dermoid cyst of neck  -     Ambulatory referral to General Surgery          Subjective:      Patient ID: Sheryle Pap is a 70 y o  male  70-year-old male patient came here with complaints of cyst over the left side of the back of his neck  He said it was there for last 3 weeks  He told me that it ruptured spontaneously  He can still feel the lump in that area  No fever  No pain  The following portions of the patient's history were reviewed and updated as appropriate: allergies, current medications, past family history, past social history, past surgical history and problem list     Review of Systems   Constitutional: Negative  HENT: Negative  Eyes: Negative  Respiratory: Negative  Cardiovascular: Negative  Gastrointestinal: Negative  Endocrine: Negative  Genitourinary: Negative  Musculoskeletal: Negative  Skin: Negative  Allergic/Immunologic: Negative  Neurological: Negative  Hematological: Negative  Psychiatric/Behavioral: Negative  Objective:      /80 (BP Location: Left arm, Patient Position: Sitting, Cuff Size: Adult)   Pulse 79   Temp 97 9 °F (36 6 °C) (Tympanic)   Resp 18   Wt 111 kg (244 lb)   BMI 35 01 kg/m²          Physical Exam  Vitals signs reviewed  Constitutional:       Appearance: He is obese  HENT:      Head: Normocephalic and atraumatic  Mouth/Throat:      Mouth: Mucous membranes are moist    Eyes:      Pupils: Pupils are equal, round, and reactive to light  Cardiovascular:      Rate and Rhythm: Normal rate and regular rhythm     Pulmonary:      Effort: Pulmonary effort is normal       Breath sounds: Normal breath sounds  Skin:     General: Skin is warm  Comments: He has a 2 cm sebaceous cyst left posterior neck  There is some redness over it  No purulent discharge  Neurological:      Mental Status: He is alert  Fall Risk

## 2025-03-18 NOTE — ED ADULT NURSE NOTE - NSFALLRISKINTERV_ED_ALL_ED
Assistance OOB with selected safe patient handling equipment if applicable/Assistance with ambulation/Communicate fall risk and risk factors to all staff, patient, and family/Monitor gait and stability/Provide visual cue: yellow wristband, yellow gown, etc/Reinforce activity limits and safety measures with patient and family/Call bell, personal items and telephone in reach/Instruct patient to call for assistance before getting out of bed/chair/stretcher/Non-slip footwear applied when patient is off stretcher/[facility] to call system/Physically safe environment - no spills, clutter or unnecessary equipment/Purposeful Proactive Rounding/Room/bathroom lighting operational, light cord in reach

## 2025-03-18 NOTE — ED ADULT NURSE NOTE - PRO INTERPRETER NEED 2
Show Aperture Variable?: Yes
Detail Level: Detailed
Render Post-Care Instructions In Note?: no
Number Of Freeze-Thaw Cycles: 1 freeze-thaw cycle
Consent: The patient's consent was obtained including but not limited to risks of crusting, scabbing, blistering, scarring, darker or lighter pigmentary change, recurrence, incomplete removal and infection.
Duration Of Freeze Thaw-Cycle (Seconds): 5
Post-Care Instructions: I reviewed with the patient in detail post-care instructions. Patient is to wear sunprotection, and avoid picking at any of the treated lesions. Pt may apply Vaseline to crusted or scabbing areas.
English

## 2025-03-19 NOTE — ED ADULT TRIAGE NOTE - CHIEF COMPLAINT QUOTE
March 20, 2025       Lucrecia Garcia DO  [address] [facility] [address]  Via In Basket      Patient: Phyllis Fulton   YOB: 2016   Date of Visit: 3/19/2025       Dear Dr. Garcia:    Thank you for referring Phyllis Fulton to me for evaluation. Below are my notes for this visit with her.    If you have questions, please do not hesitate to call me. I look forward to following your patient along with you.      Sincerely,        Gauri Ann MD        CC: No Recipients    Gauri Ann MD  3/20/2025 11:32 AM  Signed  Phyllis is a 8 year old 2 month old female with Type 1 diabetes mellitus who presents for a telehealth visit via live interactive video with a secure connection. This visit was not recorded or stored.         Provider location: outpatient clinic  Patient Location: Home in the [facility]; patient was accompanied by Mother        Consent for telehealth visit was verified with the parent/guardian, including risk of electronic data, possibility of equipment failure or need for in person visit if condition cannot be fully assessed by telehealth. Parent/guardian was also informed that consent to treat includes permission to submit charges to the patient's insurance.      Reason for visit: routine diabetes follow up  Reason for use of telehealth: patient/parent preference    Pediatric Endocrinology Diabetes Visit Note    Interval History (HPI):  Phyllis is a 8 year old 2 month old female with Type 1 diabetes mellitus presenting for follow up.     Date of diagnosis: 07/25/2023    Questions/Concerns:  Patient arrives to virtual visit with mother.  Mom reports patient's BGs being higher but no ketones. Sneaking food and will spike. Has been having some site issues which account to her high Bgs. Manual mode when she is super high and pump kicks her out of automated mode. Dance class x2 week. Recess at school. No further questions or concerns.     mother verbalized the following mealtimes:      Breakfast - 7:45am-8am  Lunch - 10-10:15am  PM Snack - 1:30pm  Dinner - 5:30pm  Bedtime Snack - 8-10pm      Illness since last visit:  ED visits in December, getting sick at school. No ketones since with other illnesses.     Refills: lispro, ketone strips, alcohol pads      CSII:  Pump/Pod: Omnipod 5  Who gives boluses: caregivers do all injections or boluses  When do you give boluses: before meals and snacks   How many boluses do you miss a week: 0-1  Pump site locations: arms and abdomen lower back, buttock   Rotating pump sites: yes  Problems with pump sites or pump: sometimes irritated arms.   Do you have danya to upload your pump to the cloud: Yes  Site change every 3 days  Active insulin time: 2 hours    Pump upload attached to visit: yes    Total daily dose: 34.8 units/day  Total basal dose: 25.3 units   Percent basal: 73%   Units/kg/day of insulin: unable to calculate, parent unsure of current weight    Phyllis Fulton     Pump Instructions    Pump type  Omnipod   Rapid acting insulin Humalog   BASAL RATE    Time U/Hr   0000 0.6   0800 0.6   INSULIN TO CARB RATIO   Time 1 u/ __ gm    0000 1 u/ 23 gm    0700 1 u/ 20 gm    0900 1 u/ 20 gm    1500 1 u/ 18 gm    2100  1 u/ 20 gm    CORRECTION FACTOR (CF)   Time  CF   0000 120   TARGET BLOOD SUGAR   Time __ mg/dL   0000 120 mg/dL   0130 130 mg/dL   0500 120 mg/dL        Blood Glucose range/average: see attached log and/or CGM report        Glucose Monitoring:  Meter brand or model:  freestyle and Rely-On at home  How many BG checks per day: 0-2    CGM: yes, Dexcom G6  Do you manually check BG? Yes, when? To verify dexcom reading      Hypoglycemia:  Frequency per week (less than 80): 1-2x week with dance  How many of those are overnight: 0-1  Hypoglycemia awareness: yes  Hypoglycemia symptoms: shaky  Glucagon at home and school: Yes  Use glucagon since last visit: No    Ketones:  Do you check for ketones: Checks  Ketones since last visit: none  Method: urine  ketone strips    Safety:   Hospitalizations since last visit: No, see above  Medical Alert ID: Yes      Nutrition:   Do you eat regular meals: breakfast, lunch, and dinner  Do you snack: afternoon and bedtime  Carb counting concerns: No    Other:   Job, sports or extracurricular activities: see above  Problems related to diabetes at school: No     PAST MEDICAL HISTORY:  Past Medical History:   Diagnosis Date   • Diabetes mellitus type 1  (CMD) 07/2023   • Hashimoto's thyroiditis    • Obesity    • Snoring 03/09/2021   • Speech delay 03/09/2021       PAST SURGICAL HISTORY:  History reviewed. No pertinent surgical history.    FAMILY HISTORY:  Family History   Problem Relation Age of Onset   • Patient is unaware of any medical problems Mother    • Asthma Father    • Diabetes Father         insulin dependent   • Diabetes Paternal Grandmother    • Diabetes Paternal Grandfather        SOCIAL HISTORY:     Social History     Social History Narrative    Lives at home with parents and siblings..        Review of Systems   Constitutional: Negative for fatigue and fever.   HENT: Negative for congestion.    Eyes: Negative for photophobia.   Respiratory: Negative for cough and shortness of breath.    Cardiovascular: Negative for chest pain and leg swelling.   Gastrointestinal: Negative for abdominal pain, constipation and diarrhea.   Endocrine: Negative for cold intolerance, heat intolerance and polyuria.       See HPI   Genitourinary:  Negative   Musculoskeletal: Negative for joint swelling.   Skin: Negative   Neurological: Negative for dizziness.   Hematological: Negative for adenopathy.   Psychiatric/Behavioral: Negative for agitation.          PHYSICAL EXAMINATION  There were no vitals taken for this visit.    (Video visit)  Physical Exam  No acute distress  HENT:      Head: Normocephalic.   Pulmonary:      Effort: Pulmonary effort is normal.   Neurological:      Mental Status: Patient is alert and oriented          Lab and  Imaging Results  Hemoglobin A1C:   No results found for this visit on 03/19/25.        Recent Results (from the past 8400 hour(s))   POCT Glycohemoglobin Analyzer    Collection Time: 04/17/24  2:35 PM   Result Value Ref Range    Hemoglobin A1C, POC 7.6 (A) 4.5 - 5.6 %   Thyroid Stimulating Hormone    Collection Time: 04/17/24  3:11 PM    Specimen: Blood, Venous   Result Value Ref Range    TSH 2.335 0.662 - 4.010 mcUnits/mL   Free T4    Collection Time: 04/17/24  3:11 PM    Specimen: Blood, Venous   Result Value Ref Range    T4, Free 1.1 0.8 - 1.4 ng/dL   Free T3    Collection Time: 04/17/24  3:11 PM    Specimen: Blood, Venous   Result Value Ref Range    T3, Free 3.3 3.3 - 4.9 pg/mL   POCT Blood Sugar Hand Held Device    Collection Time: 09/05/24  1:23 PM   Result Value Ref Range    Glucose Blood,  (A) 65 - 99 mg/dL    FASTING STATUS,     GLUCOSE, BEDSIDE - POINT OF CARE    Collection Time: 10/08/24 11:56 AM    Specimen: Blood   Result Value Ref Range    GLUCOSE, BEDSIDE - POINT OF CARE 218 (H) 70 - 99 mg/dL   Urinalysis with microscopy without culture    Collection Time: 10/08/24 12:08 PM    Specimen: Urine clean catch   Result Value Ref Range    COLOR, URINALYSIS Colorless     APPEARANCE, URINALYSIS Clear     GLUCOSE, URINALYSIS >1000 (A) Negative mg/dL    BILIRUBIN, URINALYSIS Negative Negative    KETONES, URINALYSIS 80 (AA) Negative mg/dL    SPECIFIC GRAVITY, URINALYSIS 1.010 1.005 - 1.030    OCCULT BLOOD, URINALYSIS Negative Negative    PH, URINALYSIS 5.5 5.0 - 7.0    PROTEIN, URINALYSIS Negative Negative mg/dL    UROBILINOGEN, URINALYSIS 0.2 0.2, 1.0 mg/dL    NITRITE, URINALYSIS Negative Negative    LEUKOCYTE ESTERASE, URINALYSIS Negative Negative    SQUAMOUS EPITHELIAL, URINALYSIS None Seen None Seen, 1 to 5 /hpf    ERYTHROCYTES, URINALYSIS 1 to 2 None Seen, 1 to 2 /hpf    LEUKOCYTES, URINALYSIS 1 to 5 None Seen, 1 to 5 /hpf    BACTERIA, URINALYSIS None Seen None Seen /hpf    HYALINE CASTS,  URINALYSIS None Seen None Seen, 1 to 5 /lpf   Urine, Bacterial Culture    Collection Time: 10/08/24 12:08 PM    Specimen: Urine clean catch   Result Value Ref Range    Urine, Bacterial Culture       <10,000 CFU/mL Mixed bacterial ivan with no predominating type   Comprehensive Metabolic Panel    Collection Time: 10/08/24 12:23 PM    Specimen: Blood, Venous   Result Value Ref Range    Fasting Status      Sodium 134 (L) 135 - 145 mmol/L    Potassium 4.0 3.4 - 5.1 mmol/L    Chloride 98 97 - 110 mmol/L    Carbon Dioxide 24 21 - 32 mmol/L    Anion Gap 16 7 - 19 mmol/L    Glucose 240 (H) 70 - 99 mg/dL    BUN 11 5 - 18 mg/dL    Creatinine 0.38 0.21 - 0.65 mg/dL    Glomerular Filtration Rate      BUN/Cr 29 (H) 7 - 25    Calcium 9.4 8.0 - 11.0 mg/dL    Bilirubin, Total 0.6 0.2 - 1.4 mg/dL    GOT/AST 20 10 - 55 Units/L    GPT/ALT 28 10 - 30 Units/L    Alkaline Phosphatase 303 130 - 325 Units/L    Albumin 3.6 3.4 - 5.0 g/dL    Protein, Total 6.9 6.0 - 8.0 g/dL    Globulin 3.3 2.0 - 4.0 g/dL    A/G Ratio 1.1 1.0 - 2.4   Beta Hydroxybutyrate    Collection Time: 10/08/24 12:23 PM    Specimen: Blood, Venous   Result Value Ref Range    Beta Hydroxybutyrate 1.0 (H) 0.0 - 0.3 mmol/L   CBC with Automated Differential (performable only)    Collection Time: 10/08/24 12:23 PM    Specimen: Blood, Venous   Result Value Ref Range    WBC 8.8 6.0 - 17.0 K/mcL    RBC 4.67 3.90 - 5.30 mil/mcL    HGB 12.6 11.5 - 13.5 g/dL    HCT 37.1 34.0 - 40.0 %    MCV 79.4 70.0 - 86.0 fl    MCH 27.0 24.0 - 30.0 pg    MCHC 34.0 30.0 - 36.0 g/dL    RDW-CV 12.0 11.0 - 15.0 %    RDW-SD 34.6 (L) 35.0 - 47.0 fL     140 - 450 K/mcL    NRBC 0 <=0 /100 WBC    Neutrophil, Percent 73 %    Lymphocytes, Percent 22 %    Mono, Percent 3 %    Eosinophils, Percent 1 %    Basophils, Percent 1 %    Immature Granulocytes 0 %    Absolute Neutrophils 6.5 1.5 - 8.5 K/mcL    Absolute Lymphocytes 2.0 2.0 - 8.0 K/mcL    Absolute Monocytes 0.3 0.0 - 0.8 K/mcL    Absolute  Eosinophils  0.0 0.0 - 0.7 K/mcL    Absolute Basophils 0.1 0.0 - 0.2 K/mcL    Absolute Immature Granulocytes 0.0 0.0 - 0.2 K/mcL   COVID/Flu/RSV panel    Collection Time: 10/08/24 12:29 PM    Specimen: Nasal, Mid-turbinate; Swab   Result Value Ref Range    Rapid SARS-COV-2 by PCR Not Detected Not Detected / Detected / Presumptive Positive / Inhibitors present    Influenza A by PCR Not Detected Not Detected    Influenza B by PCR Not Detected Not Detected    RSV BY PCR Not Detected Not Detected    Isolation Guidelines      Procedural Comment     BLOOD GAS, VENOUS - RESPIRATORY    Collection Time: 10/08/24 12:33 PM    Specimen: Blood, Venous   Result Value Ref Range    CONDITION - RESPIRATORY RA     FIO2 - RESPIRATORY 21 %    SITE - RESPIRATORY Venous     TEMPERATURE - RESPIRATORY 37.0 degrees C    BASE EXCESS / DEFICIT, VENOUS - RESPIRATORY 1 -2 - 2 mmol/L    HCO3, VENOUS - RESPIRATORY 26.0 22.0 - 28.0 mmol/L    PCO2, VENOUS - RESPIRATORY 42 38 - 51 mm Hg    PH, VENOUS - RESPIRATORY 7.40 7.35 - 7.45 Units    PO2, VENOUS - RESPIRATORY 43 (H) 35 - 42 mm Hg    O2 SATURATION, VENOUS - RESPIRATORY 79 60 - 80 %   POCT Glycohemoglobin Analyzer    Collection Time: 10/21/24  3:25 PM   Result Value Ref Range    Hemoglobin A1C, POC 10.3 (A) 4.5 - 5.6 %   Comprehensive Metabolic Panel    Collection Time: 12/26/24  3:46 PM    Specimen: Blood, Venous   Result Value Ref Range    Fasting Status      Sodium 137 135 - 145 mmol/L    Potassium 4.8 3.4 - 5.1 mmol/L    Chloride 104 97 - 110 mmol/L    Carbon Dioxide 21 21 - 32 mmol/L    Anion Gap 17 7 - 19 mmol/L    Glucose 203 (H) 70 - 99 mg/dL    BUN 18 5 - 18 mg/dL    Creatinine 0.37 0.21 - 0.65 mg/dL    Glomerular Filtration Rate      BUN/Cr 49 (H) 7 - 25    Calcium 9.6 8.0 - 11.0 mg/dL    Bilirubin, Total 0.5 0.2 - 1.4 mg/dL    GOT/AST 35 10 - 55 Units/L    GPT/ALT 23 10 - 30 Units/L    Alkaline Phosphatase 304 130 - 325 Units/L    Albumin 3.9 3.4 - 5.0 g/dL    Protein, Total 7.5 6.0 -  8.0 g/dL    Globulin 3.6 2.0 - 4.0 g/dL    A/G Ratio 1.1 1.0 - 2.4   C Reactive Protein    Collection Time: 12/26/24  3:46 PM    Specimen: Blood, Venous   Result Value Ref Range    C-Reactive Protein <5.0 <10.0 mg/L   CBC with Automated Differential (performable only)    Collection Time: 12/26/24  3:46 PM    Specimen: Blood, Venous   Result Value Ref Range    WBC 12.3 6.0 - 17.0 K/mcL    RBC 5.08 3.90 - 5.30 mil/mcL    HGB 13.5 11.5 - 13.5 g/dL    HCT 41.1 (H) 34.0 - 40.0 %    MCV 80.9 70.0 - 86.0 fl    MCH 26.6 24.0 - 30.0 pg    MCHC 32.8 30.0 - 36.0 g/dL    RDW-CV 11.9 11.0 - 15.0 %    RDW-SD 35.0 35.0 - 47.0 fL     140 - 450 K/mcL    NRBC 0 <=0 /100 WBC    Neutrophil, Percent 83 %    Lymphocytes, Percent 13 %    Mono, Percent 4 %    Eosinophils, Percent 0 %    Basophils, Percent 0 %    Immature Granulocytes 0 %    Absolute Neutrophils 10.1 (H) 1.5 - 8.5 K/mcL    Absolute Lymphocytes 1.6 (L) 2.0 - 8.0 K/mcL    Absolute Monocytes 0.5 0.0 - 0.8 K/mcL    Absolute Eosinophils  0.0 0.0 - 0.7 K/mcL    Absolute Basophils 0.1 0.0 - 0.2 K/mcL    Absolute Immature Granulocytes 0.0 0.0 - 0.2 K/mcL   Sedimentation Rate    Collection Time: 12/26/24  3:46 PM    Specimen: Blood, Venous   Result Value Ref Range    RBC Sedimentation Rate 15 0 - 20 mm/hr   Lipase    Collection Time: 12/26/24  3:46 PM    Specimen: Blood, Venous   Result Value Ref Range    Lipase 13 (L) 15 - 77 Units/L   Magnesium    Collection Time: 12/26/24  3:46 PM    Specimen: Blood, Venous   Result Value Ref Range    Magnesium 1.8 1.6 - 2.5 mg/dL   Rapid Respiratory Pathogen PCR    Collection Time: 12/26/24  3:47 PM    Specimen: Nasopharyngeal Swab   Result Value Ref Range    Adenovirus Not Detected Not Detected    Bordetella Parapertussis Not Detected Not Detected    Bordetella pertussis Not Detected Not Detected    Chlamydophila pneumoniae Not Detected Not Detected    Coronavirus, 229E Not Detected Not Detected    Coronavirus, HKU1 Not Detected Not  Detected    Coronavirus, NL63 Not Detected Not Detected    Coronavirus, OC43 Not Detected Not Detected    Influenza A Not Detected Not Detected    Influenza B Not Detected Not Detected    Metapneumovirus Not Detected Not Detected    Mycoplasma pneumoniae Not Detected Not Detected    Parainfluenza 1 Not Detected Not Detected    Parainfluenza 2 Not Detected Not Detected    Parainfluenza 3 Not Detected Not Detected    Parainfluenza 4 Not Detected Not Detected    Respiratory Syncytial virus Not Detected Not Detected    Rhinovirus/Enterovirus Detected (A) Not Detected    SARS-CoV-2 by PCR Not Detected Not Detected / Detected / Inhibitors Present   Streptococcus group A PCR    Collection Time: 12/26/24  4:05 PM    Specimen: Throat; Swab   Result Value Ref Range    STREPTOCOCCUS GROUP A PCR Detected (A) Not Detected   BLOOD GAS, VENOUS WITH COOXIMETRY - RESPIRATORY    Collection Time: 12/26/24  4:05 PM    Specimen: Blood, Venous   Result Value Ref Range    CARBOXYHEMOGLOBIN - RESPIRATORY 1.1 <2.1 %    FIO2 - RESPIRATORY 21 %    METHEMOGLOBIN - RESPIRATORY 0.8 <=1.6 %    SITE - RESPIRATORY Venous     TEMPERATURE - RESPIRATORY 37.0 degrees C    BASE EXCESS / DEFICIT, VENOUS - RESPIRATORY -1 -2 - 2 mmol/L    HCO3, VENOUS - RESPIRATORY 22.0 22.0 - 28.0 mmol/L    PCO2, VENOUS - RESPIRATORY 33 (L) 38 - 51 mm Hg    PH, VENOUS - RESPIRATORY 7.44 7.35 - 7.45 Units    O2 SATURATION, VENOUS - RESPIRATORY 99 (H) 60 - 80 %    PO2, VENOUS - RESPIRATORY 109 (H) 35 - 42 mm Hg    OXYHEMOGLOBIN, VENOUS - RESPIRATORY 97.0 (H) 60.0 - 80.0 %   CALCIUM, IONIZED - RESPIRATORY    Collection Time: 12/26/24  4:05 PM    Specimen: Blood   Result Value Ref Range    CALCIUM, IONIZED - RESPIRATORY 1.19 1.15 - 1.29 mmol/L   ELECTROLYTE PANEL - RESPIRATORY    Collection Time: 12/26/24  4:05 PM    Specimen: Blood   Result Value Ref Range    SODIUM - RESPIRATORY 137 135 - 145 mmol/L    POTASSIUM - RESPIRATORY 4.3 3.4 - 5.1 mmol/L    CHLORIDE - RESPIRATORY  106 97 - 110 mmol/L   GLUCOSE - RESPIRATORY    Collection Time: 12/26/24  4:05 PM    Specimen: Blood   Result Value Ref Range    GLUCOSE - RESPIRATORY 203 (H) 65 - 99 mg/dL   HEMOGLOBIN - RESPIRATORY    Collection Time: 12/26/24  4:05 PM    Specimen: Blood   Result Value Ref Range    HEMOGLOBIN - RESPIRATORY 14.1 (H) 11.5 - 13.5 g/dL   LACTIC ACID, VENOUS - RESPIRATORY    Collection Time: 12/26/24  4:05 PM    Specimen: Blood, Venous   Result Value Ref Range    LACTIC ACID, VENOUS - RESPIRATORY 1.0 <2.0 mmol/L   Urinalysis with microscopy without culture    Collection Time: 12/26/24  6:00 PM    Specimen: Urine clean catch   Result Value Ref Range    COLOR, URINALYSIS Yellow     APPEARANCE, URINALYSIS Clear     GLUCOSE, URINALYSIS Negative Negative mg/dL    BILIRUBIN, URINALYSIS Negative Negative    KETONES, URINALYSIS Negative Negative mg/dL    SPECIFIC GRAVITY, URINALYSIS 1.024 1.005 - 1.030    OCCULT BLOOD, URINALYSIS Negative Negative    PH, URINALYSIS 5.0 5.0 - 7.0    PROTEIN, URINALYSIS Negative Negative mg/dL    UROBILINOGEN, URINALYSIS 0.2 0.2, 1.0 mg/dL    NITRITE, URINALYSIS Negative Negative    LEUKOCYTE ESTERASE, URINALYSIS Moderate (A) Negative    SQUAMOUS EPITHELIAL, URINALYSIS 1 to 5 None Seen, 1 to 5 /hpf    ERYTHROCYTES, URINALYSIS 3 to 5 (A) None Seen, 1 to 2 /hpf    LEUKOCYTES, URINALYSIS 11 to 25 (A) None Seen, 1 to 5 /hpf    BACTERIA, URINALYSIS None Seen None Seen /hpf    HYALINE CASTS, URINALYSIS None Seen None Seen, 1 to 5 /lpf    MUCUS Present    Urine, Bacterial Culture    Collection Time: 12/26/24  6:00 PM    Specimen: Urine clean catch   Result Value Ref Range    Urine, Bacterial Culture       <10,000 CFU/mL Mixed bacterial ivan with no predominating type         ASSESSMENT:   1. Type 1 diabetes mellitus with hyperglycemia  (CMD)        Phyllis is a 8 year old  female with Type 1 diabetes mellitus presenting for follow up.    Type 1 diabetes in fair control    Family continues to do well  with diabetes care and has routine contact with diabetes team. Family in regular contact with diabetes team. .    CGM Report:  Continuous glucose monitoring data from the past 2 weeks was reviewed and discussed with family.  The average BG was 211 mg/dL.  GMI: 8.3%.  Patient spent 44% of time in range, 55% of time above target and < 1% of time below target.       Education: Peds Endo Diabetes Education: pump adjustment were discussed today.     PLAN:   1.  Follow-up in pediatric endocrine clinic in 3 months  2.  Insulin changes and lab tests as planned at clinic visit per instructions.            [employer]      Pump Instructions     Pump type  Omnipod 5   Rapid acting insulin Humalog   BASAL RATE    Time U/Hr   0000 0.7   0800 0.8   INSULIN TO CARB RATIO   Time 1 u/ __ gm    0000 1 u/ 23 gm    0700 1 u/ 18 gm    0900 1 u/ 19 gm    1800 1 u/ 16 gm    2100  1 u/ 18 gm    CORRECTION FACTOR (CF)   Time  CF   0000 110   TARGET BLOOD SUGAR   Time __ mg/dL   0000 120 mg/dL        3.  Monitor blood sugars pre-meals, pre-snack, bedtime and overnight. If on CGM check BG to calibrate and verify high or low blood sugars.   4.  Communicate blood sugar/ carbohydrate and insulin log to the office with instructions provided. Download CGM sensor data for review in 12 weeks       Goal for next visit: HbA1c< 7.0 %    Referral for education: none    Time: 45 minutes spent in review of chart, review of meter/logs, discussion with health care personnel as needed, during visit, in review of labs and completing documentation       Gauri Ann MD, MPH  Pediatric Endocrinology           Pt BIBA transfer from [place], pt fell out of bed, presents with displaced fx of left 7-10 ribs and displaced fx of 11th rib, pt received percocet PTA

## 2025-05-20 ENCOUNTER — INPATIENT (INPATIENT)
Facility: HOSPITAL | Age: 80
LOS: 3 days | Discharge: NURSING, REHAB OR LONG-TERM CARE | DRG: 641 | End: 2025-05-24
Attending: INTERNAL MEDICINE | Admitting: INTERNAL MEDICINE
Payer: MEDICARE

## 2025-05-20 VITALS
TEMPERATURE: 97 F | HEART RATE: 107 BPM | HEIGHT: 68 IN | OXYGEN SATURATION: 96 % | SYSTOLIC BLOOD PRESSURE: 180 MMHG | WEIGHT: 205.03 LBS | RESPIRATION RATE: 22 BRPM | DIASTOLIC BLOOD PRESSURE: 117 MMHG

## 2025-05-20 DIAGNOSIS — E87.70 FLUID OVERLOAD, UNSPECIFIED: ICD-10-CM

## 2025-05-20 DIAGNOSIS — I50.9 HEART FAILURE, UNSPECIFIED: ICD-10-CM

## 2025-05-20 DIAGNOSIS — E87.1 HYPO-OSMOLALITY AND HYPONATREMIA: ICD-10-CM

## 2025-05-20 DIAGNOSIS — E03.9 HYPOTHYROIDISM, UNSPECIFIED: ICD-10-CM

## 2025-05-20 DIAGNOSIS — J43.9 EMPHYSEMA, UNSPECIFIED: ICD-10-CM

## 2025-05-20 DIAGNOSIS — I25.10 ATHEROSCLEROTIC HEART DISEASE OF NATIVE CORONARY ARTERY WITHOUT ANGINA PECTORIS: ICD-10-CM

## 2025-05-20 DIAGNOSIS — R60.0 LOCALIZED EDEMA: ICD-10-CM

## 2025-05-20 DIAGNOSIS — Z95.1 PRESENCE OF AORTOCORONARY BYPASS GRAFT: Chronic | ICD-10-CM

## 2025-05-20 DIAGNOSIS — J69.0 PNEUMONITIS DUE TO INHALATION OF FOOD AND VOMIT: ICD-10-CM

## 2025-05-20 DIAGNOSIS — Z95.5 PRESENCE OF CORONARY ANGIOPLASTY IMPLANT AND GRAFT: Chronic | ICD-10-CM

## 2025-05-20 DIAGNOSIS — I48.91 UNSPECIFIED ATRIAL FIBRILLATION: ICD-10-CM

## 2025-05-20 DIAGNOSIS — Z29.9 ENCOUNTER FOR PROPHYLACTIC MEASURES, UNSPECIFIED: ICD-10-CM

## 2025-05-20 DIAGNOSIS — I10 ESSENTIAL (PRIMARY) HYPERTENSION: ICD-10-CM

## 2025-05-20 LAB
ALBUMIN SERPL ELPH-MCNC: 4 G/DL — SIGNIFICANT CHANGE UP (ref 3.3–5)
ALP SERPL-CCNC: 72 U/L — SIGNIFICANT CHANGE UP (ref 40–120)
ALT FLD-CCNC: 13 U/L — SIGNIFICANT CHANGE UP (ref 12–78)
ANION GAP SERPL CALC-SCNC: 10 MMOL/L — SIGNIFICANT CHANGE UP (ref 5–17)
APTT BLD: 37.7 SEC — HIGH (ref 26.1–36.8)
AST SERPL-CCNC: 18 U/L — SIGNIFICANT CHANGE UP (ref 15–37)
BASOPHILS # BLD AUTO: 0.07 K/UL — SIGNIFICANT CHANGE UP (ref 0–0.2)
BASOPHILS NFR BLD AUTO: 0.8 % — SIGNIFICANT CHANGE UP (ref 0–2)
BILIRUB SERPL-MCNC: 1 MG/DL — SIGNIFICANT CHANGE UP (ref 0.2–1.2)
BUN SERPL-MCNC: 16 MG/DL — SIGNIFICANT CHANGE UP (ref 7–23)
CALCIUM SERPL-MCNC: 9.6 MG/DL — SIGNIFICANT CHANGE UP (ref 8.5–10.1)
CHLORIDE SERPL-SCNC: 91 MMOL/L — LOW (ref 96–108)
CO2 SERPL-SCNC: 27 MMOL/L — SIGNIFICANT CHANGE UP (ref 22–31)
CREAT SERPL-MCNC: 1.3 MG/DL — SIGNIFICANT CHANGE UP (ref 0.5–1.3)
EGFR: 57 ML/MIN/1.73M2 — LOW
EGFR: 57 ML/MIN/1.73M2 — LOW
EOSINOPHIL # BLD AUTO: 0.11 K/UL — SIGNIFICANT CHANGE UP (ref 0–0.5)
EOSINOPHIL NFR BLD AUTO: 1.2 % — SIGNIFICANT CHANGE UP (ref 0–6)
GLUCOSE SERPL-MCNC: 122 MG/DL — HIGH (ref 70–99)
HCT VFR BLD CALC: 37.1 % — LOW (ref 39–50)
HGB BLD-MCNC: 12.6 G/DL — LOW (ref 13–17)
IMM GRANULOCYTES NFR BLD AUTO: 0.9 % — SIGNIFICANT CHANGE UP (ref 0–0.9)
INR BLD: 1.21 RATIO — HIGH (ref 0.85–1.16)
LACTATE SERPL-SCNC: 2.1 MMOL/L — HIGH (ref 0.7–2)
LACTATE SERPL-SCNC: 2.9 MMOL/L — HIGH (ref 0.7–2)
LACTATE SERPL-SCNC: 3.6 MMOL/L — HIGH (ref 0.7–2)
LYMPHOCYTES # BLD AUTO: 0.84 K/UL — LOW (ref 1–3.3)
LYMPHOCYTES # BLD AUTO: 9.1 % — LOW (ref 13–44)
MAGNESIUM SERPL-MCNC: 1.9 MG/DL — SIGNIFICANT CHANGE UP (ref 1.6–2.6)
MCHC RBC-ENTMCNC: 30.1 PG — SIGNIFICANT CHANGE UP (ref 27–34)
MCHC RBC-ENTMCNC: 34 G/DL — SIGNIFICANT CHANGE UP (ref 32–36)
MCV RBC AUTO: 88.8 FL — SIGNIFICANT CHANGE UP (ref 80–100)
MONOCYTES # BLD AUTO: 0.65 K/UL — SIGNIFICANT CHANGE UP (ref 0–0.9)
MONOCYTES NFR BLD AUTO: 7 % — SIGNIFICANT CHANGE UP (ref 2–14)
NEUTROPHILS # BLD AUTO: 7.5 K/UL — HIGH (ref 1.8–7.4)
NEUTROPHILS NFR BLD AUTO: 81 % — HIGH (ref 43–77)
NRBC BLD AUTO-RTO: 0 /100 WBCS — SIGNIFICANT CHANGE UP (ref 0–0)
NT-PROBNP SERPL-SCNC: HIGH PG/ML (ref 0–450)
PLATELET # BLD AUTO: 353 K/UL — SIGNIFICANT CHANGE UP (ref 150–400)
POTASSIUM SERPL-MCNC: 4 MMOL/L — SIGNIFICANT CHANGE UP (ref 3.5–5.3)
POTASSIUM SERPL-SCNC: 4 MMOL/L — SIGNIFICANT CHANGE UP (ref 3.5–5.3)
PROT SERPL-MCNC: 8.3 G/DL — SIGNIFICANT CHANGE UP (ref 6–8.3)
PROTHROM AB SERPL-ACNC: 14.3 SEC — HIGH (ref 9.9–13.4)
RAPID RVP RESULT: SIGNIFICANT CHANGE UP
RBC # BLD: 4.18 M/UL — LOW (ref 4.2–5.8)
RBC # FLD: 14.9 % — HIGH (ref 10.3–14.5)
SARS-COV-2 RNA SPEC QL NAA+PROBE: SIGNIFICANT CHANGE UP
SODIUM SERPL-SCNC: 128 MMOL/L — LOW (ref 135–145)
TROPONIN I, HIGH SENSITIVITY RESULT: 57.8 NG/L — SIGNIFICANT CHANGE UP
WBC # BLD: 9.25 K/UL — SIGNIFICANT CHANGE UP (ref 3.8–10.5)
WBC # FLD AUTO: 9.25 K/UL — SIGNIFICANT CHANGE UP (ref 3.8–10.5)

## 2025-05-20 PROCEDURE — 99223 1ST HOSP IP/OBS HIGH 75: CPT

## 2025-05-20 PROCEDURE — 71045 X-RAY EXAM CHEST 1 VIEW: CPT | Mod: 26

## 2025-05-20 PROCEDURE — 71250 CT THORAX DX C-: CPT | Mod: 26

## 2025-05-20 PROCEDURE — 99285 EMERGENCY DEPT VISIT HI MDM: CPT

## 2025-05-20 PROCEDURE — 93010 ELECTROCARDIOGRAM REPORT: CPT

## 2025-05-20 RX ORDER — TAMSULOSIN HYDROCHLORIDE 0.4 MG/1
0.4 CAPSULE ORAL AT BEDTIME
Refills: 0 | Status: DISCONTINUED | OUTPATIENT
Start: 2025-05-20 | End: 2025-05-24

## 2025-05-20 RX ORDER — MELATONIN 5 MG
3 TABLET ORAL AT BEDTIME
Refills: 0 | Status: DISCONTINUED | OUTPATIENT
Start: 2025-05-20 | End: 2025-05-21

## 2025-05-20 RX ORDER — DULOXETINE 20 MG/1
30 CAPSULE, DELAYED RELEASE ORAL DAILY
Refills: 0 | Status: DISCONTINUED | OUTPATIENT
Start: 2025-05-20 | End: 2025-05-24

## 2025-05-20 RX ORDER — DEXTROMETHORPHAN HBR, GUAIFENESIN 200 MG/10ML
200 LIQUID ORAL EVERY 6 HOURS
Refills: 0 | Status: DISCONTINUED | OUTPATIENT
Start: 2025-05-20 | End: 2025-05-24

## 2025-05-20 RX ORDER — METOPROLOL SUCCINATE 50 MG/1
25 TABLET, EXTENDED RELEASE ORAL EVERY 12 HOURS
Refills: 0 | Status: DISCONTINUED | OUTPATIENT
Start: 2025-05-20 | End: 2025-05-24

## 2025-05-20 RX ORDER — CEFTRIAXONE 500 MG/1
1000 INJECTION, POWDER, FOR SOLUTION INTRAMUSCULAR; INTRAVENOUS ONCE
Refills: 0 | Status: COMPLETED | OUTPATIENT
Start: 2025-05-20 | End: 2025-05-20

## 2025-05-20 RX ORDER — IPRATROPIUM BROMIDE AND ALBUTEROL SULFATE .5; 2.5 MG/3ML; MG/3ML
3 SOLUTION RESPIRATORY (INHALATION) ONCE
Refills: 0 | Status: COMPLETED | OUTPATIENT
Start: 2025-05-20 | End: 2025-05-20

## 2025-05-20 RX ORDER — LACTOBACILLUS ACIDOPHILUS/PECT 75 MM-100
1 CAPSULE ORAL EVERY 12 HOURS
Refills: 0 | Status: DISCONTINUED | OUTPATIENT
Start: 2025-05-20 | End: 2025-05-24

## 2025-05-20 RX ORDER — POLYETHYLENE GLYCOL 3350 17 G/17G
17 POWDER, FOR SOLUTION ORAL DAILY
Refills: 0 | Status: DISCONTINUED | OUTPATIENT
Start: 2025-05-20 | End: 2025-05-24

## 2025-05-20 RX ORDER — CLOPIDOGREL BISULFATE 75 MG/1
75 TABLET, FILM COATED ORAL DAILY
Refills: 0 | Status: DISCONTINUED | OUTPATIENT
Start: 2025-05-20 | End: 2025-05-23

## 2025-05-20 RX ORDER — FUROSEMIDE 10 MG/ML
20 INJECTION INTRAMUSCULAR; INTRAVENOUS ONCE
Refills: 0 | Status: COMPLETED | OUTPATIENT
Start: 2025-05-20 | End: 2025-05-20

## 2025-05-20 RX ORDER — ACETAMINOPHEN 500 MG/5ML
650 LIQUID (ML) ORAL EVERY 6 HOURS
Refills: 0 | Status: DISCONTINUED | OUTPATIENT
Start: 2025-05-20 | End: 2025-05-24

## 2025-05-20 RX ORDER — FINASTERIDE 1 MG/1
5 TABLET, FILM COATED ORAL DAILY
Refills: 0 | Status: DISCONTINUED | OUTPATIENT
Start: 2025-05-20 | End: 2025-05-24

## 2025-05-20 RX ORDER — ATORVASTATIN CALCIUM 80 MG/1
20 TABLET, FILM COATED ORAL AT BEDTIME
Refills: 0 | Status: DISCONTINUED | OUTPATIENT
Start: 2025-05-20 | End: 2025-05-24

## 2025-05-20 RX ORDER — FUROSEMIDE 10 MG/ML
20 INJECTION INTRAMUSCULAR; INTRAVENOUS EVERY 8 HOURS
Refills: 0 | Status: DISCONTINUED | OUTPATIENT
Start: 2025-05-20 | End: 2025-05-22

## 2025-05-20 RX ORDER — LIDOCAINE HYDROCHLORIDE 20 MG/ML
1 JELLY TOPICAL DAILY
Refills: 0 | Status: DISCONTINUED | OUTPATIENT
Start: 2025-05-20 | End: 2025-05-24

## 2025-05-20 RX ORDER — AZITHROMYCIN 250 MG
500 CAPSULE ORAL ONCE
Refills: 0 | Status: COMPLETED | OUTPATIENT
Start: 2025-05-20 | End: 2025-05-20

## 2025-05-20 RX ORDER — APIXABAN 2.5 MG/1
2.5 TABLET, FILM COATED ORAL EVERY 12 HOURS
Refills: 0 | Status: DISCONTINUED | OUTPATIENT
Start: 2025-05-21 | End: 2025-05-21

## 2025-05-20 RX ORDER — OXYCODONE HYDROCHLORIDE 30 MG/1
5 TABLET ORAL EVERY 4 HOURS
Refills: 0 | Status: DISCONTINUED | OUTPATIENT
Start: 2025-05-20 | End: 2025-05-24

## 2025-05-20 RX ORDER — LEVOTHYROXINE SODIUM 300 MCG
25 TABLET ORAL DAILY
Refills: 0 | Status: DISCONTINUED | OUTPATIENT
Start: 2025-05-20 | End: 2025-05-24

## 2025-05-20 RX ORDER — GABAPENTIN 400 MG/1
100 CAPSULE ORAL AT BEDTIME
Refills: 0 | Status: DISCONTINUED | OUTPATIENT
Start: 2025-05-20 | End: 2025-05-24

## 2025-05-20 RX ORDER — ONDANSETRON HCL/PF 4 MG/2 ML
4 VIAL (ML) INJECTION EVERY 8 HOURS
Refills: 0 | Status: DISCONTINUED | OUTPATIENT
Start: 2025-05-20 | End: 2025-05-24

## 2025-05-20 RX ORDER — MAGNESIUM, ALUMINUM HYDROXIDE 200-200 MG
30 TABLET,CHEWABLE ORAL EVERY 4 HOURS
Refills: 0 | Status: DISCONTINUED | OUTPATIENT
Start: 2025-05-20 | End: 2025-05-24

## 2025-05-20 RX ORDER — SUCRALFATE 1 G
1 TABLET ORAL
Refills: 0 | Status: DISCONTINUED | OUTPATIENT
Start: 2025-05-20 | End: 2025-05-24

## 2025-05-20 RX ORDER — METHOCARBAMOL 500 MG/1
500 TABLET, FILM COATED ORAL EVERY 8 HOURS
Refills: 0 | Status: DISCONTINUED | OUTPATIENT
Start: 2025-05-20 | End: 2025-05-24

## 2025-05-20 RX ORDER — METHYLPREDNISOLONE ACETATE 80 MG/ML
125 INJECTION, SUSPENSION INTRA-ARTICULAR; INTRALESIONAL; INTRAMUSCULAR; SOFT TISSUE ONCE
Refills: 0 | Status: COMPLETED | OUTPATIENT
Start: 2025-05-20 | End: 2025-05-20

## 2025-05-20 RX ADMIN — Medication 3 MILLIGRAM(S): at 22:55

## 2025-05-20 RX ADMIN — Medication 250 MILLILITER(S): at 21:30

## 2025-05-20 RX ADMIN — GABAPENTIN 100 MILLIGRAM(S): 400 CAPSULE ORAL at 21:31

## 2025-05-20 RX ADMIN — CEFTRIAXONE 100 MILLIGRAM(S): 500 INJECTION, POWDER, FOR SOLUTION INTRAMUSCULAR; INTRAVENOUS at 18:20

## 2025-05-20 RX ADMIN — TAMSULOSIN HYDROCHLORIDE 0.4 MILLIGRAM(S): 0.4 CAPSULE ORAL at 21:30

## 2025-05-20 RX ADMIN — FUROSEMIDE 20 MILLIGRAM(S): 10 INJECTION INTRAMUSCULAR; INTRAVENOUS at 21:35

## 2025-05-20 RX ADMIN — Medication 250 MILLIGRAM(S): at 19:01

## 2025-05-20 RX ADMIN — METHYLPREDNISOLONE ACETATE 125 MILLIGRAM(S): 80 INJECTION, SUSPENSION INTRA-ARTICULAR; INTRALESIONAL; INTRAMUSCULAR; SOFT TISSUE at 15:44

## 2025-05-20 RX ADMIN — IPRATROPIUM BROMIDE AND ALBUTEROL SULFATE 3 MILLILITER(S): .5; 2.5 SOLUTION RESPIRATORY (INHALATION) at 15:43

## 2025-05-20 RX ADMIN — IPRATROPIUM BROMIDE AND ALBUTEROL SULFATE 3 MILLILITER(S): .5; 2.5 SOLUTION RESPIRATORY (INHALATION) at 15:44

## 2025-05-20 RX ADMIN — ATORVASTATIN CALCIUM 20 MILLIGRAM(S): 80 TABLET, FILM COATED ORAL at 21:31

## 2025-05-20 RX ADMIN — Medication 250 MILLILITER(S): at 17:55

## 2025-05-20 RX ADMIN — FUROSEMIDE 20 MILLIGRAM(S): 10 INJECTION INTRAMUSCULAR; INTRAVENOUS at 17:54

## 2025-05-20 NOTE — ED ADULT TRIAGE NOTE - CHIEF COMPLAINT QUOTE
c/o SOB. pt does wear O2 as needed. hx COPD, HTN, HLD, ABD hernia, neuropathy, CAD. pt is active smoker. AOX4 from [place]. HTN elevated. pt was given albuterol 1x , O2 via n/c 2L 97%.

## 2025-05-20 NOTE — CONSULT NOTE ADULT - SUBJECTIVE AND OBJECTIVE BOX
Patient is a 79y old  Male who presents with a chief complaint of sob and leg selling (20 May 2025 20:51)      HPI:  79M with PMh of CAD s/p stents, HTN, HLD, tobacco use h/o etoh use disorder who presents with shortness of breath and elevated BP. patient reports that his main concern for his BP being elevated and noticed he was wheezing. he admits to smoking but states he usually does not wheeze. denies fevers or chills, no chest pain, or vomiting. also mentions some increased leg swelling recently Admit to monitored unit for cardiac monitoring, obtain echo to evaluate LVEF, intravenous diuresis as per card consult , monitor ins/outs, monitor renal profile and electrolytes closely ,send 3 sets of enzymes, O2 supply, serial chest xrays, monitor weights and oral intake of fluids, nutritionist consult Found to have pneumonia Admitted for septic workup and evaluation ,send blood and urine cx,serial lactate levels ,monitor vitals ,ivfs hydration ,monitor urine output and renal profile ,iv abx initiated  (20 May 2025 20:15)      Asked to see patient for ID Consult    PAST MEDICAL & SURGICAL HISTORY:  Benign Essential Hypertension      Hyperlipidemia      Acute Myocardial Infarction      Coronary Stent      Personal History of Tobacco Use      ETOH Abuse      CAD (Coronary Atherosclerotic Disease)      Chronic atrial fibrillation      Pulmonary embolism      Anemia due to acute blood loss      Heart failure      Hypothyroid      Alcohol use disorder, mild, abuse      Hypothyroidism      Benign essential HTN      BPH (benign prostatic hyperplasia)      Smokes tobacco daily      Alcohol use disorder, mild, abuse      No significant past surgical history      S/P CABG x 1      S/P CABG (coronary artery bypass graft)      Coronary stent patent          Allergies    No Known Allergies  Allergy Status Unknown    Intolerances        REVIEW OF SYSTEMS:  No vomiting  No abdominal pain    HOME MEDICATIONS:    MEDICATIONS  (STANDING):  atorvastatin 20 milliGRAM(s) Oral at bedtime  clopidogrel Tablet 75 milliGRAM(s) Oral daily  DULoxetine 30 milliGRAM(s) Oral daily  finasteride 5 milliGRAM(s) Oral daily  furosemide   Injectable 20 milliGRAM(s) IV Push every 8 hours  gabapentin 100 milliGRAM(s) Oral at bedtime  lactobacillus acidophilus 1 Tablet(s) Oral every 12 hours  levothyroxine 25 MICROGram(s) Oral daily  lidocaine   4% Patch 1 Patch Transdermal daily  metoprolol tartrate 25 milliGRAM(s) Oral every 12 hours  pantoprazole    Tablet 40 milliGRAM(s) Oral before breakfast  polyethylene glycol 3350 17 Gram(s) Oral daily  sucralfate 1 Gram(s) Oral two times a day  tamsulosin 0.4 milliGRAM(s) Oral at bedtime    MEDICATIONS  (PRN):  acetaminophen     Tablet .. 650 milliGRAM(s) Oral every 6 hours PRN Temp greater or equal to 38C (100.4F), Mild Pain (1 - 3)  aluminum hydroxide/magnesium hydroxide/simethicone Suspension 30 milliLiter(s) Oral every 4 hours PRN Dyspepsia  guaiFENesin Oral Liquid (Sugar-Free) 200 milliGRAM(s) Oral every 6 hours PRN Cough  hydrALAZINE Injectable 10 milliGRAM(s) IV Push every 6 hours PRN SBP ABOVE 170  melatonin 3 milliGRAM(s) Oral at bedtime PRN Insomnia  methocarbamol 500 milliGRAM(s) Oral every 8 hours PRN Muscle Spasm  ondansetron Injectable 4 milliGRAM(s) IV Push every 8 hours PRN Nausea and/or Vomiting  oxyCODONE    IR 5 milliGRAM(s) Oral every 4 hours PRN Severe Pain (7 - 10)      Vital Signs Last 24 Hrs  T(C): 36.6 (20 May 2025 19:40), Max: 36.6 (20 May 2025 19:40)  T(F): 97.9 (20 May 2025 19:40), Max: 97.9 (20 May 2025 19:40)  HR: 66 (20 May 2025 19:40) (66 - 107)  BP: 163/96 (20 May 2025 19:40) (163/96 - 180/117)  BP(mean): --  RR: 18 (20 May 2025 19:40) (18 - 22)  SpO2: 95% (20 May 2025 19:40) (95% - 96%)    Parameters below as of 20 May 2025 19:40  Patient On (Oxygen Delivery Method): nasal cannula  O2 Flow (L/min): 2      PHYSICAL EXAM:  HEENT: NC/AT, PERRLA  Neck: Soft, no masses.  Lungs: Coarse BS bilaterally. Crackles in lower fields. Mild wheezes.   Heart: RRR, no murmurs.   Abdomen: Soft, no tenderness. Obese. Small ventral hernia.   Genital/ Rectal: No glasgow catheter.  Extremities: Moderate edema of lower extremities (L>R).   Neurologic: Confused. No focal weakness.     I&O's Summary      LABORATORY:                          12.6   9.25  )-----------( 353      ( 20 May 2025 15:35 )             37.1           05-20    128[L]  |  91[L]  |  16  ----------------------------<  122[H]  4.0   |  27  |  1.30    Ca    9.6      20 May 2025 15:35  Mg     1.9     05-20    TPro  8.3  /  Alb  4.0  /  TBili  1.0  /  DBili  x   /  AST  18  /  ALT  13  /  AlkPhos  72  05-20      Rapid Respiratory Viral Panel Result        05-20 @ 17:16  Rapid RVP NotDetec  Coronovirus --  Adenovirus --  Bordetella Pertussis --  Chlamydia Pneumonia --  Entero/Rhinovirus--  HKU1 Coronovirus --  HMPV Coronovirus --  Influenza A --  Influenza AH1 --  Influenza AH1 2009 --  Influenza AH3 --  Influenza B --  Mycoplasma Pneumoniae --  NL63 Coronovirus --  OC43 Coronovirus --  Parainfluenza 1 --  Parainfluenza 2 --  Parainfluenza 3 --  Parainfluenza 4 --  Resp Syncytial Virus --      LABORATORY:    CBC Full  -  ( 20 May 2025 15:35 )  WBC Count : 9.25 K/uL  RBC Count : 4.18 M/uL  Hemoglobin : 12.6 g/dL  Hematocrit : 37.1 %  Platelet Count - Automated : 353 K/uL  Mean Cell Volume : 88.8 fl  Mean Cell Hemoglobin : 30.1 pg  Mean Cell Hemoglobin Concentration : 34.0 g/dL  Auto Neutrophil # : x  Auto Lymphocyte # : x  Auto Monocyte # : x  Auto Eosinophil # : x  Auto Basophil # : x  Auto Neutrophil % : x  Auto Lymphocyte % : x  Auto Monocyte % : x  Auto Eosinophil % : x  Auto Basophil % : x          05-20    128[L]  |  91[L]  |  16  ----------------------------<  122[H]  4.0   |  27  |  1.30    Ca    9.6      20 May 2025 15:35  Mg     1.9     05-20    TPro  8.3  /  Alb  4.0  /  TBili  1.0  /  DBili  x   /  AST  18  /  ALT  13  /  AlkPhos  72  05-20        Assessment and plan:    1. CHF exacerbation.  2. Dyspnea and hypoxia  3. Chronic ventral abdominal wall hernia.    . Low suspicion for pneumonia. The patient more likely has CHF exacerbation with elevated ProBNP.  . Monitor off antibiotics.  . Diuresis with IV Lasix.  . Cardiology evaluation.  . Consider surgery for hernia repair.  . Supportive care.    Thank you

## 2025-05-20 NOTE — ED PROVIDER NOTE - OBJECTIVE STATEMENT
79M with PMh of CAD s/p stents, HTN, HLD, tobacco use h/o etoh use disorder who presents with shortness of breath and elevated BP. patient reports that his main concern for his BP being elevated and noticed he was wheezing. he admits to smoking but states he usually does not wheeze. denies fevers or chills, no chest pain, or vomiting. also mentions some increased leg swelling recently

## 2025-05-20 NOTE — CONSULT NOTE ADULT - NS ATTEND AMEND GEN_ALL_CORE FT
Case discussed with surgical PA at the time of consultation.  Radiographic findings of epigastric incisional hernia containing a loop of transverse colon.  Patient presents with no obstructive symptoms but is here for CHF exacerbation, chest pain and shortness of breath.    Hernia remains soft and easily reducible on examination.  Therefore no acute surgical intervention is indicated.    The patient will be admitted to the medical service for ongoing medical and cardiac workup.

## 2025-05-20 NOTE — ED ADULT NURSE NOTE - NSFALLHARMRISKINTERV_ED_ALL_ED

## 2025-05-20 NOTE — ED PROVIDER NOTE - CLINICAL SUMMARY MEDICAL DECISION MAKING FREE TEXT BOX
79M with PMh of CAD s/p stents, HTN, HLD, tobacco use h/o etoh use disorder who presents with shortness of breath and elevated BP. patient reports that his main concern for his BP being elevated and noticed he was wheezing. he admits to smoking but states he usually does not wheeze. denies fevers or chills, no chest pain, or vomiting. also mentions some increased leg swelling recently    denies drinking alcohol daily and has not had anything to drink for past few days, denies h/o seizures or withdrawal symptoms    plan for labs imaging, medications, wheezing bilaterally however likely fluid overload with sob and leg swelling and hypertension, will hold on IVF until imaging results

## 2025-05-20 NOTE — ED ADULT NURSE NOTE - OBJECTIVE STATEMENT
pt states that he was sent by [facility] for SOB. pt reports wheezing. pt states that he also has a hiatal hernia that he might get surgery. pt denies fever. pt in no acute distress. pt updated on plan of care.

## 2025-05-20 NOTE — ED ADULT NURSE NOTE - NSICDXPASTSURGICALHX_GEN_ALL_CORE_FT
PAST SURGICAL HISTORY:  Coronary stent patent     No significant past surgical history     S/P CABG (coronary artery bypass graft)     S/P CABG x 1

## 2025-05-20 NOTE — H&P ADULT - NSHPLABSRESULTS_GEN_ALL_CORE
< from: CT Chest No Cont (05.20.25 @ 17:24) >    LUNGS, PLEURA, AND LARGE AIRWAYS: Patent central airways. Mild   centrilobular pulmonary emphysema. Diffuseinterlobular septal   thickening, particularly within the upper lobes and lung bases. There is   right greater than left lower lobe atelectasis adjacent to base wall   right pleural effusion and tiny left pleural effusion. There is an   unchanged 5 mm nodule within the left lower lobe (4-108) since 4/13/2024.  VESSELS: Borderline aneurysmal dilatation of the and descending thoracic   aorta measuring 4.5 cm in diameter the level of the right main pulmonary   artery (2-67). Postsurgical changes from prior CABG.  HEART: The heart is mildly enlarged. No pericardial effusion. Severe   coronary artery and scattered aortic valve leaflet calcifications are   present.  MEDIASTINUM AND HONEY: No lymphadenopathy.  CHEST WALL AND LOWER NECK: There is a 5.2 x 6.3 x 8.7 cm subxiphoid   hernia containing fat and a portion of nonobstructed transverse colon   with mild inflammation within the fat. The hernia neck measures   approximately 3.2 x 4.7 cm.  VISUALIZED UPPER ABDOMEN: Partial visualized left upper pole renal   calculus versus vascular calcifications.  BONES: Mild degenerative changes of the spine. Multiple median sternotomy   wires are present. There are several old healed left lateral rib fracture   deformities. No acute fracture identified.    IMPRESSION:  1. Diffuse interlobular septal thickening and small bilateral pleural   effusions, favored be due to underlying edema or less likely an   infectious or inflammatory process.  2. 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  3. 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 Correlation for   reducibility is recommended.    < end of copied text >

## 2025-05-20 NOTE — ED PROVIDER NOTE - PHYSICAL EXAMINATION
Gen: Awake, Alert, NAD  Head:  NC/AT  Eyes:  PERRL, EOMI, Conjunctiva pink, no scleral icterus  Cardiac/CV:  S1 S2, RRR, no murmurs  Respiratory/Pulm:  bilateral wheezing and rhonchi  Gastrointestinal/Abdomen:  Soft, nontender, nondistended, no rebound/guarding, large mid abdominal ventral hernia which is soft and reducible at bedside, nontender  Ext:  warm, well perfused, moving all extremities spontaneously, + edema L>R, distal pulses intact  Skin: intact, no rash, no ecchymosis  Neuro:  AAOx3, sensation intact, motor 5/5 x 4 extremities, clear speech

## 2025-05-20 NOTE — ED PROVIDER NOTE - CARE PLAN
1 Principal Discharge DX:	Fluid overload  Secondary Diagnosis:	PNA (pneumonia)  Secondary Diagnosis:	Wheezing

## 2025-05-20 NOTE — ED ADULT NURSE NOTE - NSICDXPASTMEDICALHX_GEN_ALL_CORE_FT
PAST MEDICAL HISTORY:  Acute Myocardial Infarction     Alcohol use disorder, mild, abuse     Alcohol use disorder, mild, abuse     Anemia due to acute blood loss     Benign essential HTN     Benign Essential Hypertension     BPH (benign prostatic hyperplasia)     CAD (Coronary Atherosclerotic Disease)     Chronic atrial fibrillation     Coronary Stent     ETOH Abuse     Heart failure     Hyperlipidemia     Hypothyroid     Hypothyroidism     Personal History of Tobacco Use     Pulmonary embolism     Smokes tobacco daily

## 2025-05-20 NOTE — CONSULT NOTE ADULT - SUBJECTIVE AND OBJECTIVE BOX
SURGERY PA CONSULT NOTE:    CHIEF COMPLAINT:  Patient is a 79y old  Male who presents with a chief complaint of sob and leg selling (20 May 2025 20:51)    HPI FROM ED:  79M with PMh of CAD s/p stents, HTN, HLD, tobacco use h/o etoh use disorder who presents with shortness of breath and elevated BP. Patient reports that his main concern for his BP being elevated and noticed he was wheezing. he admits to smoking but states he usually does not wheeze. denies fevers or chills, no chest pain, or vomiting. also mentions some increased leg swelling recently Admit to monitored unit for cardiac monitoring, obtain echo to evaluate LVEF, intravenous diuresis as per card consult , monitor ins/outs, monitor renal profile and electrolytes closely ,send 3 sets of enzymes, O2 supply, serial chest xrays, monitor weights and oral intake of fluids, nutritionist consult Found to have pneumonia Admitted for septic workup and evaluation ,send blood and urine cx,serial lactate levels ,monitor vitals ,ivfs hydration ,monitor urine output and renal profile ,iv abx initiated  (20 May 2025 20:15)    INTERVAL HPI:   History as stated above.  Patient is coming in with SOB, admitted for COPD exacerbation, Surgery consulted for subxiphoid hernia on CT. Patient states he had had this  hernia for a long time. I never bothers him eeven though he had noticed it slightly increased in size the past week. He denies any pain or skin changes. Patient is able to reduced the hernia himself while he lays down. but it easily herniates again as well. denies any sweats chills fevers. admits to normal BMs, passing flatus.  Current smoker; smoker 6 cigs a day.  States the hernia occurred after his open heart surgery    PAST MEDICAL HISTORY:  Benign Essential Hypertension  Hyperlipidemia  Acute Myocardial Infarction  Coronary Stent  Personal History of Tobacco Use  ETOH Abuse  CAD (Coronary Atherosclerotic Disease)  Chronic atrial fibrillation  Pulmonary embolism  Anemia due to acute blood loss  Heart failure  Hypothyroid  Alcohol use disorder, mild, abuse  Hypothyroidism  Benign essential HTN  BPH (benign prostatic hyperplasia)  Smokes tobacco daily  Alcohol use disorder, mild, abuse  Coronary stent patent    PAST SURGICAL HISTORY:  S/P CABG x 1  S/P CABG (coronary artery bypass graft)    REVIEW OF SYSTEMS:  See HPI    MEDICATIONS:  Home Medications:  acetaminophen 325 mg oral tablet: 3 tab(s) orally every 6 hours (15 Ezio 2025 14:03)  acetaminophen 325 mg oral tablet: 1 tab(s) orally every 4 hours As needed Temp greater or equal to 38C (100.4F), Mild Pain (1 - 3) (13 Apr 2024 20:00)  apixaban 2.5 mg oral tablet: 1 tab(s) orally every 12 hours (13 Apr 2024 20:00)  apixaban 2.5 mg oral tablet: 1 tab(s) orally 2 times a day (15 Ezio 2025 14:03)  atorvastatin 20 mg oral tablet: 1 tab(s) orally once a day (at bedtime) (13 Apr 2024 20:00)  atorvastatin 20 mg oral tablet: 1 tab(s) orally once a day (at bedtime) (15 Ezio 2025 14:03)  DULoxetine 30 mg oral delayed release capsule: 1 cap(s) orally once a day (13 Apr 2024 20:00)  DULoxetine 30 mg oral delayed release capsule: 1 cap(s) orally once a day (15 Ezio 2025 14:03)  finasteride 5 mg oral tablet: 1 tab(s) orally once a day (15 Ezio 2025 14:03)  finasteride 5 mg oral tablet: 1 tab(s) orally once a day (13 Apr 2024 20:00)  gabapentin 100 mg oral capsule: 1 cap(s) orally once a day (at bedtime) (13 Apr 2024 20:00)  gabapentin 100 mg oral tablet: 1 tab(s) orally once a day (15 Ezio 2025 14:03)  levothyroxine 25 mcg (0.025 mg) oral tablet: 1 tab(s) orally once a day (15 Ezio 2025 14:03)  levothyroxine 25 mcg (0.025 mg) oral tablet: 1 tab(s) orally once a day (13 Apr 2024 20:00)  lidocaine 4% topical film: Apply topically to affected area once a day (15 Ezio 2025 14:03)  methocarbamol 500 mg oral tablet: 1 tab(s) orally every 8 hours As needed Muscle Spasm (15 Ezio 2025 14:03)  metoprolol tartrate 25 mg oral tablet: 1 tab(s) orally 2 times a day (13 Apr 2024 19:57)  Metoprolol Tartrate 25 mg oral tablet: 1 tab(s) orally 2 times a day (15 Ezio 2025 14:03)  oxyCODONE 5 mg oral tablet: 1 tab(s) orally every 4 hours As needed Severe Pain (7 - 10) (15 Ezio 2025 14:03)  senna leaf extract oral tablet: 2 tab(s) orally once a day (at bedtime) (15 Ezio 2025 14:03)  sucralfate 1 g oral tablet: 1 tab(s) orally 2 times a day (13 Apr 2024 20:00)  sucralfate 1 g oral tablet: 1 tab(s) orally 2 times a day (15 Ezio 2025 14:03)  tamsulosin 0.4 mg oral capsule: 1 cap(s) orally once a day (at bedtime) (15 Ezio 2025 14:03)  tamsulosin 0.4 mg oral capsule: 1 cap(s) orally once a day (at bedtime) (13 Apr 2024 20:00)    MEDICATIONS  (STANDING):  atorvastatin 20 milliGRAM(s) Oral at bedtime  clopidogrel Tablet 75 milliGRAM(s) Oral daily  DULoxetine 30 milliGRAM(s) Oral daily  finasteride 5 milliGRAM(s) Oral daily  furosemide   Injectable 20 milliGRAM(s) IV Push every 8 hours  gabapentin 100 milliGRAM(s) Oral at bedtime  lactobacillus acidophilus 1 Tablet(s) Oral every 12 hours  levothyroxine 25 MICROGram(s) Oral daily  lidocaine   4% Patch 1 Patch Transdermal daily  metoprolol tartrate 25 milliGRAM(s) Oral every 12 hours  pantoprazole    Tablet 40 milliGRAM(s) Oral before breakfast  polyethylene glycol 3350 17 Gram(s) Oral daily  sucralfate 1 Gram(s) Oral two times a day  tamsulosin 0.4 milliGRAM(s) Oral at bedtime    MEDICATIONS  (PRN):  acetaminophen     Tablet .. 650 milliGRAM(s) Oral every 6 hours PRN Temp greater or equal to 38C (100.4F), Mild Pain (1 - 3)  aluminum hydroxide/magnesium hydroxide/simethicone Suspension 30 milliLiter(s) Oral every 4 hours PRN Dyspepsia  guaiFENesin Oral Liquid (Sugar-Free) 200 milliGRAM(s) Oral every 6 hours PRN Cough  hydrALAZINE Injectable 10 milliGRAM(s) IV Push every 6 hours PRN SBP ABOVE 170  melatonin 3 milliGRAM(s) Oral at bedtime PRN Insomnia  methocarbamol 500 milliGRAM(s) Oral every 8 hours PRN Muscle Spasm  ondansetron Injectable 4 milliGRAM(s) IV Push every 8 hours PRN Nausea and/or Vomiting  oxyCODONE    IR 5 milliGRAM(s) Oral every 4 hours PRN Severe Pain (7 - 10)      ALLERGIES:  No Known Allergies    SOCIAL HISTORY:  [facility] .No ETOH or TOBACCO reported. (20 May 2025 20:15)  Currently smoker, 6 cigs a day    FAMILY HISTORY:  FH: hypertension    VITAL SIGNS:  Vital Signs Last 24 Hrs  T(C): 36.6 (20 May 2025 19:40), Max: 36.6 (20 May 2025 19:40)  T(F): 97.9 (20 May 2025 19:40), Max: 97.9 (20 May 2025 19:40)  HR: 66 (20 May 2025 19:40) (66 - 107)  BP: 163/96 (20 May 2025 19:40) (163/96 - 180/117)  RR: 18 (20 May 2025 19:40) (18 - 22)  SpO2: 95% (20 May 2025 19:40) (95% - 96%)    Parameters below as of 20 May 2025 19:40  Patient On (Oxygen Delivery Method): nasal cannula  O2 Flow (L/min): 2    PHYSICAL EXAM:  GENERAL: NAD, lying in bed comfortably  HEAD:  Atraumatic, normocephalic  EYES: EOMI, PERRLA, conjunctiva and sclera clear  NECK: Supple, trachea midline, no JVD  HEART: Regular rate and rhythm, no murmurs, rubs, or gallops  LUNGS: Unlabored respirations. on 2L nasal cannula   ABDOMEN: Soft, nontender, nondistended, upper mid abdomen with reducible hernia, no skin changes noted.  EXTREMITIES: Bilaterally lower ext pitting edema  NERVOUS SYSTEM:  A&Ox3, moving all extremities   SKIN: No rashes or lesions    LABS:                        12.6   9.25  )-----------( 353      ( 20 May 2025 15:35 )             37.1     128[L]  |  91[L]  |  16  ----------------------------<  122[H]  4.0   |  27  |  1.30    Ca    9.6      20 May 2025 15:35  Mg     1.9     05-20    TPro  8.3  /  Alb  4.0  /  TBili  1.0  /  DBili  x   /  AST  18  /  ALT  13  /  AlkPhos  72  05-20    PT/INR - ( 20 May 2025 15:35 )   PT: 14.3 sec;   INR: 1.21 ratio       PTT - ( 20 May 2025 15:35 )  PTT:37.7 sec  Urinalysis Basic - ( 20 May 2025 15:35 )    Color: x / Appearance: x / SG: x / pH: x  Gluc: 122 mg/dL / Ketone: x  / Bili: x / Urobili: x   Blood: x / Protein: x / Nitrite: x   Leuk Esterase: x / RBC: x / WBC x   Sq Epi: x / Non Sq Epi: x / Bacteria: x    LIVER FUNCTIONS - ( 20 May 2025 15:35 )  Alb: 4.0 g/dL / Pro: 8.3 g/dL / ALK PHOS: 72 U/L / ALT: 13 U/L / AST: 18 U/L / GGT: x           RADIOLOGY & ADDITIONAL STUDIES:  < from: CT Chest No Cont (05.20.25 @ 17:24) >  ACC: [id number] EXAM:  CT CHEST   ORDERED BY:  ARABELLA SAMAYOA     PROCEDURE DATE:  05/20/2025          INTERPRETATION:  CLINICAL INFORMATION: Shortness of breath and   hypertension.    COMPARISON: Chest CT dated 4/13/2024.    CONTRAST/COMPLICATIONS:  IV Contrast: NONE  Oral Contrast: NONE    PROCEDURE:  CT of the Chest was performed.  Sagittal and coronal reformats were performed.    FINDINGS:    LUNGS, PLEURA, AND LARGE AIRWAYS: Patent central airways. Mild   centrilobular pulmonary emphysema. Diffuseinterlobular septal   thickening, particularly within the upper lobes and lung bases. There is   right greater than left lower lobe atelectasis adjacent to base wall   right pleural effusion and tiny left pleural effusion. There is an   unchanged 5 mm nodule within the left lower lobe (4-108) since 4/13/2024.  VESSELS: Borderline aneurysmal dilatation of the and descending thoracic   aorta measuring 4.5 cm in diameter the level of the right main pulmonary   artery (2-67). Postsurgical changes from prior CABG.  HEART: The heart is mildly enlarged. No pericardial effusion. Severe   coronary artery and scattered aortic valve leaflet calcifications are   present.  MEDIASTINUM AND HONEY: No lymphadenopathy.  CHEST WALL AND LOWER NECK: There is a 5.2 x 6.3 x 8.7 cm subxiphoid   hernia containing fat and a portion of nonobstructed transverse colon   with mild inflammation within the fat. The hernia neck measures   approximately 3.2 x 4.7 cm.  VISUALIZED UPPER ABDOMEN: Partial visualized left upper pole renal   calculus versus vascular calcifications.  BONES: Mild degenerative changes of the spine. Multiple median sternotomy   wires are present. There are several old healed left lateral rib fracture   deformities. No acute fracture identified.    IMPRESSION:  1. Diffuse interlobular septal thickening and small bilateral pleural   effusions, favored be due to underlying edema or less likely an   infectious or inflammatory process.  2. 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  3. 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 Correlation for   reducibility is recommended.    --- End of Report ---      ASSESSMENT:  79M with PMh of CAD s/p stents, s/p CABG, HTN, HLD, tobacco use h/o etoh use disorder, COPD, known history of subxiphoid hernia presented to the ED with SOB admitted for COPD exacerbation. CT demonstrated a 5.2 x 6.3 x 8.7 cm subxiphoid hernia containing inflamed   fat and a portion of nonobstructed mid transverse colon. Hernia is easily reducible at bedside but also herniates back when patient coughs or sits up straight. Abdomen is soft nontender. patient with normal bowel function       PLAN:  - Agree with medical admission   - Reducible hernia, no acute surgical intervention   - No signs of ischemic changes   - Normal BMs/flatus  - If repair is warranted would recommend patient for a higher level facility due to his comorbidities   - Rest of care per primary team  - Case discussed with Dr. Light

## 2025-05-20 NOTE — CHART NOTE - NSCHARTNOTEFT_GEN_A_CORE
Called by RN for critical value of lactate of 3.6. Initial lactate 2.1. Received 250cc bolus x1 as pt is admitted with acute on chronic heart failure and PNA. Also received lasix 20mg x1 after the bolus.     Plan:  - 250cc bolus x1 ordered STAT  - Can give additional lasix 20mg IV PRN for volume overload after bolus  - F/u repeat lactate at 12:00  - RN to call with any changes

## 2025-05-20 NOTE — CONSULT NOTE ADULT - SUBJECTIVE AND OBJECTIVE BOX
12.6   9.25  )-----------( 353      ( 20 May 2025 15:35 )             37.1       CBC Full  -  ( 20 May 2025 15:35 )  WBC Count : 9.25 K/uL  RBC Count : 4.18 M/uL  Hemoglobin : 12.6 g/dL  Hematocrit : 37.1 %  Platelet Count - Automated : 353 K/uL  Mean Cell Volume : 88.8 fl  Mean Cell Hemoglobin : 30.1 pg  Mean Cell Hemoglobin Concentration : 34.0 g/dL  Auto Neutrophil # : x  Auto Lymphocyte # : x  Auto Monocyte # : x  Auto Eosinophil # : x  Auto Basophil # : x  Auto Neutrophil % : x  Auto Lymphocyte % : x  Auto Monocyte % : x  Auto Eosinophil % : x  Auto Basophil % : x      05-20    128[L]  |  91[L]  |  16  ----------------------------<  122[H]  4.0   |  27  |  1.30    Ca    9.6      20 May 2025 15:35  Mg     1.9     05-20    TPro  8.3  /  Alb  4.0  /  TBili  1.0  /  DBili  x   /  AST  18  /  ALT  13  /  AlkPhos  72  05-20      CAPILLARY BLOOD GLUCOSE          Vital Signs Last 24 Hrs  T(C): 36.2 (20 May 2025 14:20), Max: 36.2 (20 May 2025 14:20)  T(F): 97.2 (20 May 2025 14:20), Max: 97.2 (20 May 2025 14:20)  HR: 107 (20 May 2025 14:20) (107 - 107)  BP: 180/117 (20 May 2025 14:20) (180/117 - 180/117)  BP(mean): --  RR: 22 (20 May 2025 14:20) (22 - 22)  SpO2: 96% (20 May 2025 14:20) (96% - 96%)        Urinalysis Basic - ( 20 May 2025 15:35 )    Color: x / Appearance: x / SG: x / pH: x  Gluc: 122 mg/dL / Ketone: x  / Bili: x / Urobili: x   Blood: x / Protein: x / Nitrite: x   Leuk Esterase: x / RBC: x / WBC x   Sq Epi: x / Non Sq Epi: x / Bacteria: x        PT/INR - ( 20 May 2025 15:35 )   PT: 14.3 sec;   INR: 1.21 ratio         PTT - ( 20 May 2025 15:35 )  PTT:37.7 sec     Patient is a 79y Male whom presented to the hospital with hyponatremia     PAST MEDICAL & SURGICAL HISTORY:  Benign Essential Hypertension      Hyperlipidemia      Acute Myocardial Infarction      Coronary Stent      Personal History of Tobacco Use      ETOH Abuse      CAD (Coronary Atherosclerotic Disease)      Chronic atrial fibrillation      Pulmonary embolism      Anemia due to acute blood loss      Heart failure      Hypothyroid      Alcohol use disorder, mild, abuse      Hypothyroidism      Benign essential HTN      BPH (benign prostatic hyperplasia)      Smokes tobacco daily      Alcohol use disorder, mild, abuse      No significant past surgical history      S/P CABG x 1      S/P CABG (coronary artery bypass graft)      Coronary stent patent          MEDICATIONS  (STANDING):  apixaban 2.5 milliGRAM(s) Oral every 12 hours  atorvastatin 20 milliGRAM(s) Oral at bedtime  clopidogrel Tablet 75 milliGRAM(s) Oral daily  DULoxetine 30 milliGRAM(s) Oral daily  finasteride 5 milliGRAM(s) Oral daily  folic acid 1 milliGRAM(s) Oral daily  furosemide   Injectable 20 milliGRAM(s) IV Push every 8 hours  gabapentin 100 milliGRAM(s) Oral at bedtime  lactobacillus acidophilus 1 Tablet(s) Oral every 12 hours  levothyroxine 25 MICROGram(s) Oral daily  lidocaine   4% Patch 1 Patch Transdermal daily  melatonin 3 milliGRAM(s) Oral at bedtime  metoprolol tartrate 25 milliGRAM(s) Oral every 12 hours  nicotine -   7 mG/24Hr(s) Patch 1 Patch Transdermal daily  pantoprazole    Tablet 40 milliGRAM(s) Oral before breakfast  polyethylene glycol 3350 17 Gram(s) Oral daily  potassium chloride    Tablet ER 20 milliEquivalent(s) Oral daily  senna 2 Tablet(s) Oral at bedtime  sodium chloride 1 Gram(s) Oral every 12 hours  sucralfate 1 Gram(s) Oral two times a day  tamsulosin 0.4 milliGRAM(s) Oral at bedtime  thiamine 100 milliGRAM(s) Oral daily      Allergies    No Known Allergies  Allergy Status Unknown    Intolerances        SOCIAL HISTORY:  Denies ETOh,Smoking,     FAMILY HISTORY:  FH: hypertension        REVIEW OF SYSTEMS:    CONSTITUTIONAL: No weakness, fevers or chills  RESPIRATORY: No cough, wheezing, hemoptysis; No shortness of breath  CARDIOVASCULAR: No chest pain or palpitations  GASTROINTESTINAL: No abdominal or epigastric pain. No nausea, vomiting,     No diarrhea or constipation. No melena   GENITOURINARY: No dysuria, frequency or hematuria  SKIN: dry          CAPILLARY BLOOD GLUCOSE          Vital Signs Last 24 Hrs  T(C): 36.2 (20 May 2025 14:20), Max: 36.2 (20 May 2025 14:20)  T(F): 97.2 (20 May 2025 14:20), Max: 97.2 (20 May 2025 14:20)  HR: 107 (20 May 2025 14:20) (107 - 107)  BP: 180/117 (20 May 2025 14:20) (180/117 - 180/117)  BP(mean): --  RR: 22 (20 May 2025 14:20) (22 - 22)  SpO2: 96% (20 May 2025 14:20) (96% - 96%)        Urinalysis Basic - ( 20 May 2025 15:35 )    Color: x / Appearance: x / SG: x / pH: x  Gluc: 122 mg/dL / Ketone: x  / Bili: x / Urobili: x   Blood: x / Protein: x / Nitrite: x   Leuk Esterase: x / RBC: x / WBC x   Sq Epi: x / Non Sq Epi: x / Bacteria: x        PT/INR - ( 20 May 2025 15:35 )   PT: 14.3 sec;   INR: 1.21 ratio              PHYSICAL EXAM:    Constitutional: NAD  HEENT: conjunctive   clear   Neck:  No JVD  Respiratory: decrease bs b/l   Cardiovascular: S1 and S2  Gastrointestinal: BS+, soft, NT/ND ,   Extremities: No peripheral edema  : No Crisostomo  Skin: No rashes  Access: Not applicable            MEDICATIONS  (STANDING):  apixaban 2.5 milliGRAM(s) Oral every 12 hours  atorvastatin 20 milliGRAM(s) Oral at bedtime  clopidogrel Tablet 75 milliGRAM(s) Oral daily  DULoxetine 30 milliGRAM(s) Oral daily  finasteride 5 milliGRAM(s) Oral daily  folic acid 1 milliGRAM(s) Oral daily  furosemide   Injectable 20 milliGRAM(s) IV Push every 8 hours  gabapentin 100 milliGRAM(s) Oral at bedtime  lactobacillus acidophilus 1 Tablet(s) Oral every 12 hours  levothyroxine 25 MICROGram(s) Oral daily  lidocaine   4% Patch 1 Patch Transdermal daily  melatonin 3 milliGRAM(s) Oral at bedtime  metoprolol tartrate 25 milliGRAM(s) Oral every 12 hours  nicotine -   7 mG/24Hr(s) Patch 1 Patch Transdermal daily  pantoprazole    Tablet 40 milliGRAM(s) Oral before breakfast  polyethylene glycol 3350 17 Gram(s) Oral daily  potassium chloride    Tablet ER 20 milliEquivalent(s) Oral daily  senna 2 Tablet(s) Oral at bedtime  sodium chloride 1 Gram(s) Oral every 12 hours  sucralfate 1 Gram(s) Oral two times a day  tamsulosin 0.4 milliGRAM(s) Oral at bedtime  thiamine 100 milliGRAM(s) Oral daily                                                                      12.6   9.25  )-----------( 353      ( 20 May 2025 15:35 )             37.1       CBC Full  -  ( 20 May 2025 15:35 )  WBC Count : 9.25 K/uL  RBC Count : 4.18 M/uL  Hemoglobin : 12.6 g/dL  Hematocrit : 37.1 %  Platelet Count - Automated : 353 K/uL  Mean Cell Volume : 88.8 fl  Mean Cell Hemoglobin : 30.1 pg  Mean Cell Hemoglobin Concentration : 34.0 g/dL  Auto Neutrophil # : x  Auto Lymphocyte # : x  Auto Monocyte # : x  Auto Eosinophil # : x  Auto Basophil # : x  Auto Neutrophil % : x  Auto Lymphocyte % : x  Auto Monocyte % : x  Auto Eosinophil % : x  Auto Basophil % : x      05-20    128[L]  |  91[L]  |  16  ----------------------------<  122[H]  4.0   |  27  |  1.30    Ca    9.6      20 May 2025 15:35  Mg     1.9     05-20    TPro  8.3  /  Alb  4.0  /  TBili  1.0  /  DBili  x   /  AST  18  /  ALT  13  /  AlkPhos  72  05-20      CAPILLARY BLOOD GLUCOSE          Vital Signs Last 24 Hrs  T(C): 36.2 (20 May 2025 14:20), Max: 36.2 (20 May 2025 14:20)  T(F): 97.2 (20 May 2025 14:20), Max: 97.2 (20 May 2025 14:20)  HR: 107 (20 May 2025 14:20) (107 - 107)  BP: 180/117 (20 May 2025 14:20) (180/117 - 180/117)  BP(mean): --  RR: 22 (20 May 2025 14:20) (22 - 22)  SpO2: 96% (20 May 2025 14:20) (96% - 96%)        Urinalysis Basic - ( 20 May 2025 15:35 )    Color: x / Appearance: x / SG: x / pH: x  Gluc: 122 mg/dL / Ketone: x  / Bili: x / Urobili: x   Blood: x / Protein: x / Nitrite: x   Leuk Esterase: x / RBC: x / WBC x   Sq Epi: x / Non Sq Epi: x / Bacteria: x        PT/INR - ( 20 May 2025 15:35 )   PT: 14.3 sec;   INR: 1.21 ratio         PTT - ( 20 May 2025 15:35 )  PTT:37.7 sec

## 2025-05-20 NOTE — ED ADULT NURSE NOTE - CHIEF COMPLAINT QUOTE
c/o SOB. pt does wear O2 as needed. hx COPD, HTN, HLD, ABD hernia, neuropathy, CAD. pt is active smoker. AOX4 from [place]. HTN elevated. pt was given albuterol 1x , O2 via n/c 2L 97%.

## 2025-05-20 NOTE — CONSULT NOTE ADULT - SUBJECTIVE AND OBJECTIVE BOX
CHIEF COMPLAINT/ REASON FOR VISIT  .. Patient was seen to address the  issue listed under PROBLEM LIST which is located toward bottom of this note     BERNARDO DIGJUSTIN    PLV APER 51    Allergies    No Known Allergies  Allergy Status Unknown    Intolerances        PAST MEDICAL & SURGICAL HISTORY:  Benign Essential Hypertension      Hyperlipidemia      Acute Myocardial Infarction      Coronary Stent      Personal History of Tobacco Use      ETOH Abuse      CAD (Coronary Atherosclerotic Disease)      Chronic atrial fibrillation      Pulmonary embolism      Anemia due to acute blood loss      Heart failure      Hypothyroid      Alcohol use disorder, mild, abuse      Hypothyroidism      Benign essential HTN      BPH (benign prostatic hyperplasia)      Smokes tobacco daily      Alcohol use disorder, mild, abuse      No significant past surgical history      S/P CABG x 1      S/P CABG (coronary artery bypass graft)      Coronary stent patent          FAMILY HISTORY:  FH: hypertension        Home Medications:  acetaminophen 325 mg oral tablet: 3 tab(s) orally every 6 hours (15 Ezio 2025 14:03)  acetaminophen 325 mg oral tablet: 1 tab(s) orally every 4 hours As needed Temp greater or equal to 38C (100.4F), Mild Pain (1 - 3) (13 Apr 2024 20:00)  apixaban 2.5 mg oral tablet: 1 tab(s) orally every 12 hours (13 Apr 2024 20:00)  apixaban 2.5 mg oral tablet: 1 tab(s) orally 2 times a day (15 Ezio 2025 14:03)  atorvastatin 20 mg oral tablet: 1 tab(s) orally once a day (at bedtime) (13 Apr 2024 20:00)  atorvastatin 20 mg oral tablet: 1 tab(s) orally once a day (at bedtime) (15 Ezio 2025 14:03)  DULoxetine 30 mg oral delayed release capsule: 1 cap(s) orally once a day (13 Apr 2024 20:00)  DULoxetine 30 mg oral delayed release capsule: 1 cap(s) orally once a day (15 Ezio 2025 14:03)  finasteride 5 mg oral tablet: 1 tab(s) orally once a day (15 Ezio 2025 14:03)  finasteride 5 mg oral tablet: 1 tab(s) orally once a day (13 Apr 2024 20:00)  gabapentin 100 mg oral capsule: 1 cap(s) orally once a day (at bedtime) (13 Apr 2024 20:00)  gabapentin 100 mg oral tablet: 1 tab(s) orally once a day (15 Ezio 2025 14:03)  levothyroxine 25 mcg (0.025 mg) oral tablet: 1 tab(s) orally once a day (15 Ezio 2025 14:03)  levothyroxine 25 mcg (0.025 mg) oral tablet: 1 tab(s) orally once a day (13 Apr 2024 20:00)  lidocaine 4% topical film: Apply topically to affected area once a day (15 Ezio 2025 14:03)  methocarbamol 500 mg oral tablet: 1 tab(s) orally every 8 hours As needed Muscle Spasm (15 Ezio 2025 14:03)  metoprolol tartrate 25 mg oral tablet: 1 tab(s) orally 2 times a day (13 Apr 2024 19:57)  Metoprolol Tartrate 25 mg oral tablet: 1 tab(s) orally 2 times a day (15 Ezio 2025 14:03)  oxyCODONE 5 mg oral tablet: 1 tab(s) orally every 4 hours As needed Severe Pain (7 - 10) (15 Ezio 2025 14:03)  senna leaf extract oral tablet: 2 tab(s) orally once a day (at bedtime) (15 Ezio 2025 14:03)  sucralfate 1 g oral tablet: 1 tab(s) orally 2 times a day (13 Apr 2024 20:00)  sucralfate 1 g oral tablet: 1 tab(s) orally 2 times a day (15 Ezio 2025 14:03)  tamsulosin 0.4 mg oral capsule: 1 cap(s) orally once a day (at bedtime) (15 Ezio 2025 14:03)  tamsulosin 0.4 mg oral capsule: 1 cap(s) orally once a day (at bedtime) (13 Apr 2024 20:00)      MEDICATIONS  (STANDING):  lactobacillus acidophilus 1 Tablet(s) Oral every 12 hours  pantoprazole    Tablet 40 milliGRAM(s) Oral before breakfast  polyethylene glycol 3350 17 Gram(s) Oral daily  sodium chloride 0.9% Bolus 250 milliLiter(s) IV Bolus once    MEDICATIONS  (PRN):  acetaminophen     Tablet .. 650 milliGRAM(s) Oral every 6 hours PRN Temp greater or equal to 38C (100.4F), Mild Pain (1 - 3)  aluminum hydroxide/magnesium hydroxide/simethicone Suspension 30 milliLiter(s) Oral every 4 hours PRN Dyspepsia  guaiFENesin Oral Liquid (Sugar-Free) 200 milliGRAM(s) Oral every 6 hours PRN Cough  hydrALAZINE Injectable 10 milliGRAM(s) IV Push every 6 hours PRN SBP ABOVE 170  melatonin 3 milliGRAM(s) Oral at bedtime PRN Insomnia  ondansetron Injectable 4 milliGRAM(s) IV Push every 8 hours PRN Nausea and/or Vomiting      Diet, DASH/TLC:   Sodium & Cholesterol Restricted  Easy to Chew (EASYTOCHEW) (05-20-25 @ 20:35) [Active]          Vital Signs Last 24 Hrs  T(C): 36.6 (20 May 2025 19:40), Max: 36.6 (20 May 2025 19:40)  T(F): 97.9 (20 May 2025 19:40), Max: 97.9 (20 May 2025 19:40)  HR: 66 (20 May 2025 19:40) (66 - 107)  BP: 163/96 (20 May 2025 19:40) (163/96 - 180/117)  BP(mean): --  RR: 18 (20 May 2025 19:40) (18 - 22)  SpO2: 95% (20 May 2025 19:40) (95% - 96%)    Parameters below as of 20 May 2025 19:40  Patient On (Oxygen Delivery Method): nasal cannula  O2 Flow (L/min): 2                LABS:                        12.6   9.25  )-----------( 353      ( 20 May 2025 15:35 )             37.1     05-20    128[L]  |  91[L]  |  16  ----------------------------<  122[H]  4.0   |  27  |  1.30    Ca    9.6      20 May 2025 15:35  Mg     1.9     05-20    TPro  8.3  /  Alb  4.0  /  TBili  1.0  /  DBili  x   /  AST  18  /  ALT  13  /  AlkPhos  72  05-20    PT/INR - ( 20 May 2025 15:35 )   PT: 14.3 sec;   INR: 1.21 ratio         PTT - ( 20 May 2025 15:35 )  PTT:37.7 sec  Urinalysis Basic - ( 20 May 2025 15:35 )    Color: x / Appearance: x / SG: x / pH: x  Gluc: 122 mg/dL / Ketone: x  / Bili: x / Urobili: x   Blood: x / Protein: x / Nitrite: x   Leuk Esterase: x / RBC: x / WBC x   Sq Epi: x / Non Sq Epi: x / Bacteria: x            WBC:  WBC Count: 9.25 K/uL (05-20 @ 15:35)      MICROBIOLOGY:  RECENT CULTURES:              PT/INR - ( 20 May 2025 15:35 )   PT: 14.3 sec;   INR: 1.21 ratio         PTT - ( 20 May 2025 15:35 )  PTT:37.7 sec    Sodium:  Sodium: 128 mmol/L (05-20 @ 15:35)      1.30 mg/dL 05-20 @ 15:35      Hemoglobin:  Hemoglobin: 12.6 g/dL (05-20 @ 15:35)      Platelets: Platelet Count - Automated: 353 K/uL (05-20 @ 15:35)      LIVER FUNCTIONS - ( 20 May 2025 15:35 )  Alb: 4.0 g/dL / Pro: 8.3 g/dL / ALK PHOS: 72 U/L / ALT: 13 U/L / AST: 18 U/L / GGT: x             Urinalysis Basic - ( 20 May 2025 15:35 )    Color: x / Appearance: x / SG: x / pH: x  Gluc: 122 mg/dL / Ketone: x  / Bili: x / Urobili: x   Blood: x / Protein: x / Nitrite: x   Leuk Esterase: x / RBC: x / WBC x   Sq Epi: x / Non Sq Epi: x / Bacteria: x        RADIOLOGY & ADDITIONAL STUDIES:      MICROBIOLOGY:  RECENT CULTURES:

## 2025-05-20 NOTE — ED PROVIDER NOTE - PROGRESS NOTE DETAILS
patient likely has fluid overload however will still cover with empiric abx, will require admission for diuresis and monitoring, wheezing is improved, no acute distress remains on NC patient's CXR concerning for possible right PNa, will cover with empiric abx, slightly elevated lactate will give very small fluid bolus and also give diuresis

## 2025-05-20 NOTE — CONSULT NOTE ADULT - ASSESSMENT
79M with PMh of CAD s/p stents, HTN, HLD, tobacco use h/o etoh use disorder who presents with shortness of breath and elevated BP. patient reports that his main concern for his BP being elevated and noticed he was wheezing. he admits to smoking but states he usually does not wheeze. denies fevers or chills, no chest pain, or vomiting. also mentions some increased leg swelling recently Admit to monitored unit for cardiac monitoring, obtain echo to evaluate LVEF, intravenous diuresis as per card consult , monitor ins/outs, monitor renal profile and electrolytes closely ,send 3 sets of enzymes, O2 supply, serial chest xrays, monitor weights and oral intake of fluids, nutritionist consult Found to have pneumonia Admitted for septic workup and evaluation ,send blood and urine cx,serial lactate levels ,monitor vitals ,ivfs hydration ,monitor urine output and renal profile ,iv abx initiated  (20 May 2025 20:15)    hyponatremia  will check ua , urine osmolality , urine sodium , urine uric acid , serum sodium , serum osmolality , serum uric acid , f/u with hyponatremia work up , f/u with bmp , monitor i and o      fluid overload   furosemide   Injectable 20 milliGRAM(s) IV Push every 8 hours      dvt apixaban 2.5 milliGRAM(s) Oral every 12 hours      BP monitoring,continue current antihypertensive meds, low salt diet,followup with PMD in 1-2 weeks  metoprolol tartrate 25 milliGRAM(s) Oral every 12 hours

## 2025-05-20 NOTE — CONSULT NOTE ADULT - ASSESSMENT
Initial evaluation/Pulmonary Critical Care Consultation requested by DR FROST on 5/20/2025   from Dr Inocencio Tovar    Patient examined chart reviewed    HOSPITAL ADMISSION   PATIENT CAME  FROM (if information available)      REASON FOR VISIT  .. Management of problems listed below         CC.   . 5/20/2025 c/o SOB. pt does wear O2 as needed. hx COPD, HTN, HLD, ABD hernia, neuropathy, CAD. pt is active smoker. AOX4 from [place]. HTN elevated. pt was given albuterol 1x , O2 via n/c 2L 97%.  shortness of breath, hypertension    OVERALL PRESENTATION.  . 5/20/2025 79M with PMh of CAD s/p stents, HTN, HLD, tobacco use h/o etoh use disorder who presents with shortness of breath and elevated BP. patient reports that his main concern for his BP being elevated and noticed he was wheezing. he admits to smoking but states he usually does not wheeze. denies fevers or chills, no chest pain, or vomiting. also mentions some increased leg swelling recently  PMH.        [id number]  VITALS/GAS EXCHANGE/DRIPS    ABGS.     .  VS/ PO/IO/ VENT/ DRIPS.   5/20/2025 afeb 100 160/90   5/20/2025 2l 95%     [id number]  PROBLEM ASSESSMENT RECOMMENDATIONS.  RESP.   . GAS EXCHANGE .   .... target PO 90-95%     . SOB  .... DD COPD CHF VTE PNEUM  .... 5/20/2025 vte unlikely as pt was on apixaba   .... 5/20/2025 sob is likely sec chf and copd     INFECTION.  . DATA  .... w 5/20/2025 w 9.2   .... cxr 5/20/2025 cxr infiltr r mid lower lung   .... ct ch 5/20/2025 cw 4/13/2025   ........ diffuse interlobular septal thickening ans small bl pl effs favored to be likely due to underlying edema or less likely infection   ....... aneurysmal dilation mid ascending thoracic aorta 4.5 cm   ........ 5.2 x 6.3 x 8.7 cm subxiphoid hernia containing inflamed fat and a portion of non obstructed mid transverse colon   .... rvp 5/20/2025 rvp (-)     . RO PNEUMONIA   .... 5/20/2025 low susp for infection    CARDIAC.  . HOME MEDS .  .... METOPROLOL 25.2  .... PLAVIX 75  .... ATORVASTAT 20  .... APIXA 2.5 X 2     . CAD    .... Trop 5/20/2025 tr 57   .... monitor     . LACTICEMIA   .... la 5/20- 5/20/2025 la 2.1 - 3.6  .... 5/20/2025 lacticemia is likelu sec to chf related flow problem     . CHF  .... pbnp 5/20/2025 pbnp [id number]  .... cardio eval     HEMAT.  . DATA  .... Hb 5/20/2025 Hb 12.6   .... Plt 5/20/2025 plt 353   .... inr 5/20/2025inr 121   .... monitor     GI.   . DIET .   .... 5/20/2025 dash     . LFT MONITORING   .... LFTS    5/20/2025   ........ AP   72     ........ AST  18  ........ ALT   13     RENAL.  . DATA  .... K 5/20/2025 k 4   .... CO2 5/20/2025 co2 27   .... Cr 5/20/2025 Cr 1.3   .... ag 5/20/2025 ag 10   .... alb 5/20/2025 alb 4     . HYPONATREMIA   .... Na 5/20/2025 Na 128  .... checlk sosm uosm jose         [id number]   SUMMARY BASELINE .     . 79M with PMh of CAD s/p stents, HTN, HLD, tobacco use h/o etoh use disorder from [facility]   CC.  . 5/20/2025 SHORTNESS OF BREATH    .  MAIN ISSUES.  . SMOKER   . SHORTNESS OF BREATH   . THORACIC AORTIC ANEURYSM   .... ct ch 5/20/2025 cw 4/13/2025   ....... aneurysmal dilation mid ascending thoracic aorta 4.5 cm   . LACTICEMIA   .... la 5/20- 5/20/2025 la 2.1 - 3.6  . CHF  .... pbnp 5/20/2025 pbnp [id number]  .... ct  5/20/2025 cw 4/13/2025   ........ diffuse interlobular septal thickening ans small bl pl effs favored to be likely due to underlying edema or less likely infectio  . HYPONATREMIA   .... Na 5/20/2025 Na 128  . ETOH USE   . SUBXIPHOID HERNIA ANTERIOR ABDOMINAL WALL   .... ct  5/20/2025 cw 4/13/2025   ........ 5.2 x 6.3 x 8.7 cm subxiphoid hernia containing inflamed fat and a portion of non obstructed mid transverse colon     PMH.   . active smoker  . COPD,   . HTN,   . HLD,   . CAD  . ABD hernia,   . neuropathy    PROCEDURES/DEVICES .      DISCUSSIONS.  .... Discussed with primary care and relevant consultants on an ongoing basis     POSTDISCHARGE INSTRUCTIONS  .    PMH.    TIME SPENT.  . Over 55  minutes aggregate care time spent on encounter; activities included   direct patient care, counseling and/or coordinating care reviewing notes, lab data/ imaging , discussion with multidisciplinary team/ patient  /family and explaining in detail risks, benefits, alternatives  of the recommendations     INNA CHANG 79 m 5/20/2025 1947

## 2025-05-20 NOTE — H&P ADULT - HISTORY OF PRESENT ILLNESS
79M with PMh of CAD s/p stents, HTN, HLD, tobacco use h/o etoh use disorder who presents with shortness of breath and elevated BP. patient reports that his main concern for his BP being elevated and noticed he was wheezing. he admits to smoking but states he usually does not wheeze. denies fevers or chills, no chest pain, or vomiting. also mentions some increased leg swelling recently Admit to monitored unit for cardiac monitoring, obtain echo to evaluate LVEF, intravenous diuresis as per card consult , monitor ins/outs, monitor renal profile and electrolytes closely ,send 3 sets of enzymes, O2 supply, serial chest xrays, monitor weights and oral intake of fluids, nutritionist consult Found to have pneumonia Admitted for septic workup and evaluation ,send blood and urine cx,serial lactate levels ,monitor vitals ,ivfs hydration ,monitor urine output and renal profile ,iv abx initiated

## 2025-05-20 NOTE — H&P ADULT - REASON FOR ADMISSION
Abdomen: Soft, nontender, nondistended , no guarding or rigidity , no masses palpable , normal bowel sounds , Liver and Spleen , no hepatomegaly present , no hepatosplenomegaly , liver nontender , spleen not palpable  sob and leg selling

## 2025-05-21 DIAGNOSIS — J96.01 ACUTE RESPIRATORY FAILURE WITH HYPOXIA: ICD-10-CM

## 2025-05-21 DIAGNOSIS — I50.9 HEART FAILURE, UNSPECIFIED: ICD-10-CM

## 2025-05-21 LAB
ALBUMIN SERPL ELPH-MCNC: 3.4 G/DL — SIGNIFICANT CHANGE UP (ref 3.3–5)
ALP SERPL-CCNC: 63 U/L — SIGNIFICANT CHANGE UP (ref 40–120)
ALT FLD-CCNC: 10 U/L — LOW (ref 12–78)
ANION GAP SERPL CALC-SCNC: 9 MMOL/L — SIGNIFICANT CHANGE UP (ref 5–17)
APPEARANCE UR: CLEAR — SIGNIFICANT CHANGE UP
AST SERPL-CCNC: 11 U/L — LOW (ref 15–37)
BASOPHILS # BLD AUTO: 0.01 K/UL — SIGNIFICANT CHANGE UP (ref 0–0.2)
BASOPHILS NFR BLD AUTO: 0.3 % — SIGNIFICANT CHANGE UP (ref 0–2)
BILIRUB SERPL-MCNC: 0.9 MG/DL — SIGNIFICANT CHANGE UP (ref 0.2–1.2)
BILIRUB UR-MCNC: NEGATIVE — SIGNIFICANT CHANGE UP
BUN SERPL-MCNC: 18 MG/DL — SIGNIFICANT CHANGE UP (ref 7–23)
CALCIUM SERPL-MCNC: 8.9 MG/DL — SIGNIFICANT CHANGE UP (ref 8.5–10.1)
CHLORIDE SERPL-SCNC: 95 MMOL/L — LOW (ref 96–108)
CO2 SERPL-SCNC: 24 MMOL/L — SIGNIFICANT CHANGE UP (ref 22–31)
COLOR SPEC: YELLOW — SIGNIFICANT CHANGE UP
CREAT SERPL-MCNC: 1.3 MG/DL — SIGNIFICANT CHANGE UP (ref 0.5–1.3)
DIFF PNL FLD: NEGATIVE — SIGNIFICANT CHANGE UP
EGFR: 57 ML/MIN/1.73M2 — LOW
EGFR: 57 ML/MIN/1.73M2 — LOW
EOSINOPHIL # BLD AUTO: 0 K/UL — SIGNIFICANT CHANGE UP (ref 0–0.5)
EOSINOPHIL NFR BLD AUTO: 0 % — SIGNIFICANT CHANGE UP (ref 0–6)
ETHANOL SERPL-MCNC: <10 MG/DL — SIGNIFICANT CHANGE UP (ref 0–10)
GLUCOSE SERPL-MCNC: 161 MG/DL — HIGH (ref 70–99)
GLUCOSE UR QL: NEGATIVE MG/DL — SIGNIFICANT CHANGE UP
HCT VFR BLD CALC: 34.4 % — LOW (ref 39–50)
HGB BLD-MCNC: 11.8 G/DL — LOW (ref 13–17)
IMM GRANULOCYTES NFR BLD AUTO: 1.3 % — HIGH (ref 0–0.9)
INR BLD: 1.2 RATIO — HIGH (ref 0.85–1.16)
KETONES UR QL: NEGATIVE MG/DL — SIGNIFICANT CHANGE UP
LACTATE SERPL-SCNC: 1.1 MMOL/L — SIGNIFICANT CHANGE UP (ref 0.7–2)
LEUKOCYTE ESTERASE UR-ACNC: NEGATIVE — SIGNIFICANT CHANGE UP
LYMPHOCYTES # BLD AUTO: 0.35 K/UL — LOW (ref 1–3.3)
LYMPHOCYTES # BLD AUTO: 9.3 % — LOW (ref 13–44)
MAGNESIUM SERPL-MCNC: 1.6 MG/DL — SIGNIFICANT CHANGE UP (ref 1.6–2.6)
MCHC RBC-ENTMCNC: 29.9 PG — SIGNIFICANT CHANGE UP (ref 27–34)
MCHC RBC-ENTMCNC: 34.3 G/DL — SIGNIFICANT CHANGE UP (ref 32–36)
MCV RBC AUTO: 87.1 FL — SIGNIFICANT CHANGE UP (ref 80–100)
MONOCYTES # BLD AUTO: 0.12 K/UL — SIGNIFICANT CHANGE UP (ref 0–0.9)
MONOCYTES NFR BLD AUTO: 3.2 % — SIGNIFICANT CHANGE UP (ref 2–14)
NEUTROPHILS # BLD AUTO: 3.22 K/UL — SIGNIFICANT CHANGE UP (ref 1.8–7.4)
NEUTROPHILS NFR BLD AUTO: 85.9 % — HIGH (ref 43–77)
NITRITE UR-MCNC: NEGATIVE — SIGNIFICANT CHANGE UP
NRBC BLD AUTO-RTO: 0 /100 WBCS — SIGNIFICANT CHANGE UP (ref 0–0)
NT-PROBNP SERPL-SCNC: HIGH PG/ML (ref 0–450)
OSMOLALITY SERPL: 271 MOSMOL/KG — LOW (ref 280–301)
OSMOLALITY UR: 228 MOSM/KG — SIGNIFICANT CHANGE UP (ref 50–1200)
PH UR: 5.5 — SIGNIFICANT CHANGE UP (ref 5–8)
PHOSPHATE SERPL-MCNC: 2.7 MG/DL — SIGNIFICANT CHANGE UP (ref 2.5–4.5)
PLATELET # BLD AUTO: 302 K/UL — SIGNIFICANT CHANGE UP (ref 150–400)
POTASSIUM SERPL-MCNC: 3.7 MMOL/L — SIGNIFICANT CHANGE UP (ref 3.5–5.3)
POTASSIUM SERPL-SCNC: 3.7 MMOL/L — SIGNIFICANT CHANGE UP (ref 3.5–5.3)
PROCALCITONIN SERPL-MCNC: 0.04 NG/ML — SIGNIFICANT CHANGE UP
PROT SERPL-MCNC: 7.4 G/DL — SIGNIFICANT CHANGE UP (ref 6–8.3)
PROT UR-MCNC: NEGATIVE MG/DL — SIGNIFICANT CHANGE UP
PROTHROM AB SERPL-ACNC: 14 SEC — HIGH (ref 9.9–13.4)
RBC # BLD: 3.95 M/UL — LOW (ref 4.2–5.8)
RBC # FLD: 14.7 % — HIGH (ref 10.3–14.5)
SODIUM SERPL-SCNC: 128 MMOL/L — LOW (ref 135–145)
SODIUM UR-SCNC: 62 MMOL/L — SIGNIFICANT CHANGE UP
SP GR SPEC: 1.01 — SIGNIFICANT CHANGE UP (ref 1–1.03)
TROPONIN I, HIGH SENSITIVITY RESULT: 26.3 NG/L — SIGNIFICANT CHANGE UP
TSH SERPL-MCNC: 0.86 UIU/ML — SIGNIFICANT CHANGE UP (ref 0.36–3.74)
UROBILINOGEN FLD QL: 0.2 MG/DL — SIGNIFICANT CHANGE UP (ref 0.2–1)
WBC # BLD: 3.75 K/UL — LOW (ref 3.8–10.5)
WBC # FLD AUTO: 3.75 K/UL — LOW (ref 3.8–10.5)

## 2025-05-21 PROCEDURE — 99232 SBSQ HOSP IP/OBS MODERATE 35: CPT

## 2025-05-21 RX ORDER — FOLIC ACID 1 MG/1
1 TABLET ORAL DAILY
Refills: 0 | Status: DISCONTINUED | OUTPATIENT
Start: 2025-05-21 | End: 2025-05-24

## 2025-05-21 RX ORDER — DICLOFENAC SODIUM 10 MG/G
0 GEL TOPICAL
Refills: 0 | DISCHARGE

## 2025-05-21 RX ORDER — APIXABAN 2.5 MG/1
5 TABLET, FILM COATED ORAL EVERY 12 HOURS
Refills: 0 | Status: DISCONTINUED | OUTPATIENT
Start: 2025-05-21 | End: 2025-05-21

## 2025-05-21 RX ORDER — SENNA 187 MG
2 TABLET ORAL AT BEDTIME
Refills: 0 | Status: DISCONTINUED | OUTPATIENT
Start: 2025-05-21 | End: 2025-05-24

## 2025-05-21 RX ORDER — MELATONIN 5 MG
3 TABLET ORAL AT BEDTIME
Refills: 0 | Status: DISCONTINUED | OUTPATIENT
Start: 2025-05-21 | End: 2025-05-24

## 2025-05-21 RX ORDER — NICOTINE POLACRILEX 4 MG/1
1 GUM, CHEWING ORAL DAILY
Refills: 0 | Status: DISCONTINUED | OUTPATIENT
Start: 2025-05-21 | End: 2025-05-24

## 2025-05-21 RX ORDER — APIXABAN 2.5 MG/1
2.5 TABLET, FILM COATED ORAL EVERY 12 HOURS
Refills: 0 | Status: DISCONTINUED | OUTPATIENT
Start: 2025-05-21 | End: 2025-05-23

## 2025-05-21 RX ORDER — ACETAMINOPHEN 500 MG/5ML
2 LIQUID (ML) ORAL
Refills: 0 | DISCHARGE

## 2025-05-21 RX ADMIN — Medication 1 TABLET(S): at 17:00

## 2025-05-21 RX ADMIN — Medication 2 TABLET(S): at 21:15

## 2025-05-21 RX ADMIN — DULOXETINE 30 MILLIGRAM(S): 20 CAPSULE, DELAYED RELEASE ORAL at 13:31

## 2025-05-21 RX ADMIN — Medication 25 MICROGRAM(S): at 06:52

## 2025-05-21 RX ADMIN — FUROSEMIDE 20 MILLIGRAM(S): 10 INJECTION INTRAMUSCULAR; INTRAVENOUS at 06:52

## 2025-05-21 RX ADMIN — CLOPIDOGREL BISULFATE 75 MILLIGRAM(S): 75 TABLET, FILM COATED ORAL at 13:30

## 2025-05-21 RX ADMIN — METOPROLOL SUCCINATE 25 MILLIGRAM(S): 50 TABLET, EXTENDED RELEASE ORAL at 06:51

## 2025-05-21 RX ADMIN — Medication 40 MILLIGRAM(S): at 06:51

## 2025-05-21 RX ADMIN — TAMSULOSIN HYDROCHLORIDE 0.4 MILLIGRAM(S): 0.4 CAPSULE ORAL at 21:15

## 2025-05-21 RX ADMIN — Medication 3 MILLIGRAM(S): at 21:14

## 2025-05-21 RX ADMIN — Medication 20 MILLIEQUIVALENT(S): at 13:31

## 2025-05-21 RX ADMIN — METOPROLOL SUCCINATE 25 MILLIGRAM(S): 50 TABLET, EXTENDED RELEASE ORAL at 17:00

## 2025-05-21 RX ADMIN — APIXABAN 2.5 MILLIGRAM(S): 2.5 TABLET, FILM COATED ORAL at 17:00

## 2025-05-21 RX ADMIN — Medication 1 GRAM(S): at 06:51

## 2025-05-21 RX ADMIN — ATORVASTATIN CALCIUM 20 MILLIGRAM(S): 80 TABLET, FILM COATED ORAL at 21:15

## 2025-05-21 RX ADMIN — FOLIC ACID 1 MILLIGRAM(S): 1 TABLET ORAL at 17:02

## 2025-05-21 RX ADMIN — Medication 1 GRAM(S): at 17:00

## 2025-05-21 RX ADMIN — Medication 100 MILLIGRAM(S): at 17:02

## 2025-05-21 RX ADMIN — FUROSEMIDE 20 MILLIGRAM(S): 10 INJECTION INTRAMUSCULAR; INTRAVENOUS at 21:14

## 2025-05-21 RX ADMIN — Medication 1 GRAM(S): at 17:02

## 2025-05-21 RX ADMIN — FINASTERIDE 5 MILLIGRAM(S): 1 TABLET, FILM COATED ORAL at 13:30

## 2025-05-21 RX ADMIN — Medication 1 TABLET(S): at 06:52

## 2025-05-21 RX ADMIN — FUROSEMIDE 20 MILLIGRAM(S): 10 INJECTION INTRAMUSCULAR; INTRAVENOUS at 13:31

## 2025-05-21 RX ADMIN — GABAPENTIN 100 MILLIGRAM(S): 400 CAPSULE ORAL at 21:15

## 2025-05-21 NOTE — PHARMACOTHERAPY INTERVENTION NOTE - COMMENTS
78 yo male presenting with shortness of breath and elevated BP. Currently ordered for Eliquis 5 mg BID for the indication of paroxsymal atrial fibrillation Discussed with Dr. ROME Brown - per facility medication list, patient is on Eliquis 2.5 mg BID for "chronic embolism and thrombos unspecified deep veins of r low extremity". Order adjusted to reflect facility dosing/indication per discussion.  80 yo male presenting with shortness of breath and elevated BP. Currently ordered for Eliquis 5 mg BID for the indication of paroxsymal atrial fibrillation. Discussed with Dr. ROME Brown - per facility medication list, patient is on Eliquis 2.5 mg BID for "chronic embolism and thrombos unspecified deep veins of r low extremity". Order adjusted to reflect facility dosing/indication per discussion.  80 yo male presenting with shortness of breath and elevated BP. Currently ordered for Eliquis 5 mg BID for the indication of paroxsymal atrial fibrillation. Discussed with Dr. ROME Brown - per facility medication list, patient is on Eliquis 2.5 mg BID for "chronic embolism and thrombos unspecified deep veins of r low extremity". Order adjusted to reflect facility dosing/indication per discussion. Dr. Brown to follow up if change in dose/indication is needed.  78 yo male presenting with shortness of breath and elevated BP. Currently ordered for Eliquis 5 mg BID for the indication of paroxsymal atrial fibrillation. Discussed with Dr. ROME Brown - per facility medication list, patient is on Eliquis 2.5 mg BID for "chronic embolism and thrombos unspecified deep veins of r low extremity". Order adjusted to reflect facility dosing/indication per discussion. Dr. Brown to clarify if change dose/indication is needed.  80 yo male presenting with shortness of breath and elevated BP. Currently ordered for Eliquis 5 mg BID for the indication of paroxsymal atrial fibrillation. Discussed with Dr. ROME Brown - per facility medication list, patient is on Eliquis 2.5 mg BID for "chronic embolism and thrombos unspecified deep veins of r low extremity". MD would like to adjust the order to reflect facility dosing/indication per discussion. Dr. Brown to clarify if change dose/indication is needed.

## 2025-05-21 NOTE — PROGRESS NOTE ADULT - SUBJECTIVE AND OBJECTIVE BOX
SUBJECTIVE:  Patient seen and examined at bedside this morning, denies any acute complaints, reports he is short of breath because his hernia is getting larger. Denies any CP, abdominal pain, fever or chills.     MEDICATIONS  (STANDING):  apixaban 2.5 milliGRAM(s) Oral every 12 hours  atorvastatin 20 milliGRAM(s) Oral at bedtime  clopidogrel Tablet 75 milliGRAM(s) Oral daily  DULoxetine 30 milliGRAM(s) Oral daily  finasteride 5 milliGRAM(s) Oral daily  furosemide   Injectable 20 milliGRAM(s) IV Push every 8 hours  gabapentin 100 milliGRAM(s) Oral at bedtime  lactobacillus acidophilus 1 Tablet(s) Oral every 12 hours  levothyroxine 25 MICROGram(s) Oral daily  lidocaine   4% Patch 1 Patch Transdermal daily  melatonin 3 milliGRAM(s) Oral at bedtime  metoprolol tartrate 25 milliGRAM(s) Oral every 12 hours  pantoprazole    Tablet 40 milliGRAM(s) Oral before breakfast  polyethylene glycol 3350 17 Gram(s) Oral daily  potassium chloride    Tablet ER 20 milliEquivalent(s) Oral daily  senna 2 Tablet(s) Oral at bedtime  sodium chloride 1 Gram(s) Oral every 12 hours  sucralfate 1 Gram(s) Oral two times a day  tamsulosin 0.4 milliGRAM(s) Oral at bedtime    MEDICATIONS  (PRN):  acetaminophen     Tablet .. 650 milliGRAM(s) Oral every 6 hours PRN Temp greater or equal to 38C (100.4F), Mild Pain (1 - 3)  aluminum hydroxide/magnesium hydroxide/simethicone Suspension 30 milliLiter(s) Oral every 4 hours PRN Dyspepsia  guaiFENesin Oral Liquid (Sugar-Free) 200 milliGRAM(s) Oral every 6 hours PRN Cough  hydrALAZINE Injectable 10 milliGRAM(s) IV Push every 4 hours PRN for systolic BP>170  methocarbamol 500 milliGRAM(s) Oral every 8 hours PRN Muscle Spasm  ondansetron Injectable 4 milliGRAM(s) IV Push every 8 hours PRN Nausea and/or Vomiting  oxyCODONE    IR 5 milliGRAM(s) Oral every 4 hours PRN Severe Pain (7 - 10)      Vital Signs Last 24 Hrs  T(C): 36.8 (21 May 2025 08:33), Max: 36.8 (21 May 2025 08:33)  T(F): 98.3 (21 May 2025 08:33), Max: 98.3 (21 May 2025 08:33)  HR: 59 (21 May 2025 08:33) (59 - 107)  BP: 143/81 (21 May 2025 08:33) (122/92 - 180/117)  BP(mean): --  RR: 18 (21 May 2025 08:33) (17 - 22)  SpO2: 100% (21 May 2025 08:33) (93% - 100%)    Parameters below as of 21 May 2025 06:47  Patient On (Oxygen Delivery Method): nasal cannula  O2 Flow (L/min): 3      PHYSICAL EXAM:  Constitutional: AAOx3, no acute distress  HEENT: NCAT, airway patent  Cardiovascular: RRR, pulses present bilaterally  Respiratory: nonlabored breathing  Gastrointestinal: abdomen rotund, soft, nontender, non distended, no rebound or guarding, 3ish cm defect below xyphoid with reducible hernia   Neuro: no focal deficits  Extremities: non edematous, no calf pain bilaterally     I&O's Detail      LABS:                        11.8   3.75  )-----------( 302      ( 21 May 2025 05:52 )             34.4     05-21    128[L]  |  95[L]  |  18  ----------------------------<  161[H]  3.7   |  24  |  1.30    Ca    8.9      21 May 2025 05:52  Phos  2.7     05-21  Mg     1.6     05-21    TPro  7.4  /  Alb  3.4  /  TBili  0.9  /  DBili  x   /  AST  11[L]  /  ALT  10[L]  /  AlkPhos  63  05-21    PT/INR - ( 21 May 2025 05:52 )   PT: 14.0 sec;   INR: 1.20 ratio         PTT - ( 20 May 2025 15:35 )  PTT:37.7 sec  Urinalysis Basic - ( 21 May 2025 05:52 )    Color: x / Appearance: x / SG: x / pH: x  Gluc: 161 mg/dL / Ketone: x  / Bili: x / Urobili: x   Blood: x / Protein: x / Nitrite: x   Leuk Esterase: x / RBC: x / WBC x   Sq Epi: x / Non Sq Epi: x / Bacteria: x

## 2025-05-21 NOTE — CARE COORDINATION ASSESSMENT. - OTHER PERTINENT REFERRAL INFORMATION
Sw met with Pt at bedside. Pt is A & O x3 and able to make his needs known. Sw introduced self and role and pt expressed verbal understanding. Pt has a past hx of ETOH abuse and currently lives at [facility] and is somewhat indep there. Pts emergency contact is his dtr and the plan is for the patient to return to [facility] upon DC.

## 2025-05-21 NOTE — CARE COORDINATION ASSESSMENT. - NSPASTMEDSURGHISTORY_GEN_ALL_CORE_FT
PAST MEDICAL & SURGICAL HISTORY:  CAD (Coronary Atherosclerotic Disease)      ETOH Abuse      Personal History of Tobacco Use      Coronary Stent      Acute Myocardial Infarction      Hyperlipidemia      Benign Essential Hypertension      No significant past surgical history      Alcohol use disorder, mild, abuse      Hypothyroid      Heart failure      Anemia due to acute blood loss      Pulmonary embolism      Chronic atrial fibrillation      S/P CABG x 1      Alcohol use disorder, mild, abuse      Smokes tobacco daily      BPH (benign prostatic hyperplasia)      Benign essential HTN      Hypothyroidism      Coronary stent patent      S/P CABG (coronary artery bypass graft)

## 2025-05-21 NOTE — PROGRESS NOTE ADULT - SUBJECTIVE AND OBJECTIVE BOX
Patient is a 79y Male whom presented to the hospital with hyponatremia     PAST MEDICAL & SURGICAL HISTORY:  Benign Essential Hypertension      Hyperlipidemia      Acute Myocardial Infarction      Coronary Stent      Personal History of Tobacco Use      ETOH Abuse      CAD (Coronary Atherosclerotic Disease)      Chronic atrial fibrillation      Pulmonary embolism      Anemia due to acute blood loss      Heart failure      Hypothyroid      Alcohol use disorder, mild, abuse      Hypothyroidism      Benign essential HTN      BPH (benign prostatic hyperplasia)      Smokes tobacco daily      Alcohol use disorder, mild, abuse      No significant past surgical history      S/P CABG x 1      S/P CABG (coronary artery bypass graft)      Coronary stent patent          MEDICATIONS  (STANDING):  apixaban 2.5 milliGRAM(s) Oral every 12 hours  atorvastatin 20 milliGRAM(s) Oral at bedtime  clopidogrel Tablet 75 milliGRAM(s) Oral daily  DULoxetine 30 milliGRAM(s) Oral daily  finasteride 5 milliGRAM(s) Oral daily  folic acid 1 milliGRAM(s) Oral daily  furosemide   Injectable 20 milliGRAM(s) IV Push every 8 hours  gabapentin 100 milliGRAM(s) Oral at bedtime  lactobacillus acidophilus 1 Tablet(s) Oral every 12 hours  levothyroxine 25 MICROGram(s) Oral daily  lidocaine   4% Patch 1 Patch Transdermal daily  melatonin 3 milliGRAM(s) Oral at bedtime  metoprolol tartrate 25 milliGRAM(s) Oral every 12 hours  nicotine -   7 mG/24Hr(s) Patch 1 Patch Transdermal daily  pantoprazole    Tablet 40 milliGRAM(s) Oral before breakfast  polyethylene glycol 3350 17 Gram(s) Oral daily  potassium chloride    Tablet ER 20 milliEquivalent(s) Oral daily  senna 2 Tablet(s) Oral at bedtime  sodium chloride 1 Gram(s) Oral every 12 hours  sucralfate 1 Gram(s) Oral two times a day  tamsulosin 0.4 milliGRAM(s) Oral at bedtime  thiamine 100 milliGRAM(s) Oral daily      Allergies    No Known Allergies  Allergy Status Unknown    Intolerances        SOCIAL HISTORY:  Denies ETOh,Smoking,     FAMILY HISTORY:  FH: hypertension        REVIEW OF SYSTEMS:    CONSTITUTIONAL: No weakness, fevers or chills  RESPIRATORY: No cough, wheezing, hemoptysis; No shortness of breath  CARDIOVASCULAR: No chest pain or palpitations  GASTROINTESTINAL: No abdominal or epigastric pain. No nausea, vomiting,     No diarrhea or constipation. No melena   GENITOURINARY: No dysuria, frequency or hematuria  SKIN: dry                            11.8   3.75  )-----------( 302      ( 21 May 2025 05:52 )             34.4       CBC Full  -  ( 21 May 2025 05:52 )  WBC Count : 3.75 K/uL  RBC Count : 3.95 M/uL  Hemoglobin : 11.8 g/dL  Hematocrit : 34.4 %  Platelet Count - Automated : 302 K/uL  Mean Cell Volume : 87.1 fl  Mean Cell Hemoglobin : 29.9 pg  Mean Cell Hemoglobin Concentration : 34.3 g/dL  Auto Neutrophil # : x  Auto Lymphocyte # : x  Auto Monocyte # : x  Auto Eosinophil # : x  Auto Basophil # : x  Auto Neutrophil % : x  Auto Lymphocyte % : x  Auto Monocyte % : x  Auto Eosinophil % : x  Auto Basophil % : x      05-21    128[L]  |  95[L]  |  18  ----------------------------<  161[H]  3.7   |  24  |  1.30    Ca    8.9      21 May 2025 05:52  Phos  2.7     05-21  Mg     1.6     05-21    TPro  7.4  /  Alb  3.4  /  TBili  0.9  /  DBili  x   /  AST  11[L]  /  ALT  10[L]  /  AlkPhos  63  05-21      CAPILLARY BLOOD GLUCOSE          Vital Signs Last 24 Hrs  T(C): 37.1 (21 May 2025 15:50), Max: 37.1 (21 May 2025 15:50)  T(F): 98.7 (21 May 2025 15:50), Max: 98.7 (21 May 2025 15:50)  HR: 67 (21 May 2025 17:04) (59 - 72)  BP: 145/82 (21 May 2025 17:04) (122/92 - 163/96)  BP(mean): --  RR: 18 (21 May 2025 15:50) (17 - 19)  SpO2: 96% (21 May 2025 15:50) (93% - 100%)    Parameters below as of 21 May 2025 15:50  Patient On (Oxygen Delivery Method): nasal cannula  O2 Flow (L/min): 2      Urinalysis Basic - ( 21 May 2025 05:52 )    Color: x / Appearance: x / SG: x / pH: x  Gluc: 161 mg/dL / Ketone: x  / Bili: x / Urobili: x   Blood: x / Protein: x / Nitrite: x   Leuk Esterase: x / RBC: x / WBC x   Sq Epi: x / Non Sq Epi: x / Bacteria: x        PT/INR - ( 21 May 2025 05:52 )   PT: 14.0 sec;   INR: 1.20 ratio         PTT - ( 20 May 2025 15:35 )  PTT:37.7 sec      PHYSICAL EXAM:    Constitutional: NAD  HEENT: conjunctive   clear   Neck:  No JVD  Respiratory: decrease bs b/l   Cardiovascular: S1 and S2  Gastrointestinal: BS+, soft, NT/ND ,   Extremities: No peripheral edema  : No Crisostomo  Skin: No rashes  Access: Not applicable            MEDICATIONS  (STANDING):  apixaban 2.5 milliGRAM(s) Oral every 12 hours  atorvastatin 20 milliGRAM(s) Oral at bedtime  clopidogrel Tablet 75 milliGRAM(s) Oral daily  DULoxetine 30 milliGRAM(s) Oral daily  finasteride 5 milliGRAM(s) Oral daily  folic acid 1 milliGRAM(s) Oral daily  furosemide   Injectable 20 milliGRAM(s) IV Push every 8 hours  gabapentin 100 milliGRAM(s) Oral at bedtime  lactobacillus acidophilus 1 Tablet(s) Oral every 12 hours  levothyroxine 25 MICROGram(s) Oral daily  lidocaine   4% Patch 1 Patch Transdermal daily  melatonin 3 milliGRAM(s) Oral at bedtime  metoprolol tartrate 25 milliGRAM(s) Oral every 12 hours  nicotine -   7 mG/24Hr(s) Patch 1 Patch Transdermal daily  pantoprazole    Tablet 40 milliGRAM(s) Oral before breakfast  polyethylene glycol 3350 17 Gram(s) Oral daily  potassium chloride    Tablet ER 20 milliEquivalent(s) Oral daily  senna 2 Tablet(s) Oral at bedtime  sodium chloride 1 Gram(s) Oral every 12 hours  sucralfate 1 Gram(s) Oral two times a day  tamsulosin 0.4 milliGRAM(s) Oral at bedtime  thiamine 100 milliGRAM(s) Oral daily                                                                      12.6   9.25  )-----------( 353      ( 20 May 2025 15:35 )             37.1       CBC Full  -  ( 20 May 2025 15:35 )  WBC Count : 9.25 K/uL  RBC Count : 4.18 M/uL  Hemoglobin : 12.6 g/dL  Hematocrit : 37.1 %  Platelet Count - Automated : 353 K/uL  Mean Cell Volume : 88.8 fl  Mean Cell Hemoglobin : 30.1 pg  Mean Cell Hemoglobin Concentration : 34.0 g/dL  Auto Neutrophil # : x  Auto Lymphocyte # : x  Auto Monocyte # : x  Auto Eosinophil # : x  Auto Basophil # : x  Auto Neutrophil % : x  Auto Lymphocyte % : x  Auto Monocyte % : x  Auto Eosinophil % : x  Auto Basophil % : x      05-20    128[L]  |  91[L]  |  16  ----------------------------<  122[H]  4.0   |  27  |  1.30    Ca    9.6      20 May 2025 15:35  Mg     1.9     05-20    TPro  8.3  /  Alb  4.0  /  TBili  1.0  /  DBili  x   /  AST  18  /  ALT  13  /  AlkPhos  72  05-20      CAPILLARY BLOOD GLUCOSE          Vital Signs Last 24 Hrs  T(C): 36.2 (20 May 2025 14:20), Max: 36.2 (20 May 2025 14:20)  T(F): 97.2 (20 May 2025 14:20), Max: 97.2 (20 May 2025 14:20)  HR: 107 (20 May 2025 14:20) (107 - 107)  BP: 180/117 (20 May 2025 14:20) (180/117 - 180/117)  BP(mean): --  RR: 22 (20 May 2025 14:20) (22 - 22)  SpO2: 96% (20 May 2025 14:20) (96% - 96%)        Urinalysis Basic - ( 20 May 2025 15:35 )    Color: x / Appearance: x / SG: x / pH: x  Gluc: 122 mg/dL / Ketone: x  / Bili: x / Urobili: x   Blood: x / Protein: x / Nitrite: x   Leuk Esterase: x / RBC: x / WBC x   Sq Epi: x / Non Sq Epi: x / Bacteria: x        PT/INR - ( 20 May 2025 15:35 )   PT: 14.3 sec;   INR: 1.21 ratio         PTT - ( 20 May 2025 15:35 )  PTT:37.7 sec

## 2025-05-21 NOTE — PHARMACOTHERAPY INTERVENTION NOTE - COMMENTS
Medication reconciliation was completed by pharmacy representative and reviewed by clinical pharmacist. Several discrepancies were discussed with Dr. Marcos. MD aware and will adjust medications per clinical discretion.     Home Medications:  apixaban 2.5 mg oral tablet: 1 tab(s) orally 2 times a day (21 May 2025 08:24)  atorvastatin 20 mg oral tablet: 1 tab(s) orally once a day (at bedtime) (21 May 2025 08:24)  diclofenac topical: Apply 2 grams topically to both knees 2 times a day (21 May 2025 08:30)  DULoxetine 30 mg oral delayed release capsule: 1 cap(s) orally once a day (21 May 2025 08:24)  finasteride 5 mg oral tablet: 1 tab(s) orally once a day (21 May 2025 08:24)  gabapentin 100 mg oral capsule: 1 cap(s) orally once a day (at bedtime) (21 May 2025 08:24)  levothyroxine 25 mcg (0.025 mg) oral tablet: 1 tab(s) orally once a day (21 May 2025 08:24)  melatonin 3 mg oral tablet: 1 tab(s) orally once a day (at bedtime) (21 May 2025 08:46)  Metoprolol Tartrate 25 mg oral tablet: 1 tab(s) orally 2 times a day (21 May 2025 08:24)  Plavix 75 mg oral tablet: 1 tab(s) orally once a day (21 May 2025 08:46)  Senna 8.6 mg oral tablet: 2 tab(s) orally once a day (at bedtime) (21 May 2025 08:46)  Sodium Chloride 1000 mg oral tablet, soluble: 1 tab(s) orally every 8 hours for 10 days (21 May 2025 08:46)  sucralfate 1 g oral tablet: 1 tab(s) orally 2 times a day Give 1 hour before meals. (21 May 2025 09:47)  tamsulosin 0.4 mg oral capsule: 1 cap(s) orally once a day (at bedtime) (21 May 2025 08:24)  Tylenol 325 mg oral tablet: 2 tab(s) orally every 12 hours as needed for  mild pain (21 May 2025 08:31)    Aria Cullen, PharmD  Clinical Pharmacy Specialist [phone]  Available on [employer] Teams

## 2025-05-21 NOTE — PROGRESS NOTE ADULT - SUBJECTIVE AND OBJECTIVE BOX
PROGRESS NOTE  Patient is a 79y old  Male who presents with a chief complaint of sob and leg selling (21 May 2025 17:46)  Chart and available morning labs /imaging are reviewed electronically , urgent issues addressed . More information  is being added upon completion of rounds , when more information is collected and management discussed with consultants , medical staff and social service/case management on the floor   OVERNIGHT  No new issues reported by medical staff . All above noted Patient is resting in a bed comfortably .Confused ,poor mentation .No distress noted   HPI:  79M with PMh of CAD s/p stents, HTN, HLD, tobacco use h/o etoh use disorder who presents with shortness of breath and elevated BP. patient reports that his main concern for his BP being elevated and noticed he was wheezing. he admits to smoking but states he usually does not wheeze. denies fevers or chills, no chest pain, or vomiting. also mentions some increased leg swelling recently Admit to monitored unit for cardiac monitoring, obtain echo to evaluate LVEF, intravenous diuresis as per card consult , monitor ins/outs, monitor renal profile and electrolytes closely ,send 3 sets of enzymes, O2 supply, serial chest xrays, monitor weights and oral intake of fluids, nutritionist consult Found to have pneumonia Admitted for septic workup and evaluation ,send blood and urine cx,serial lactate levels ,monitor vitals ,ivfs hydration ,monitor urine output and renal profile ,iv abx initiated  (20 May 2025 20:15)    PAST MEDICAL & SURGICAL HISTORY:  Benign Essential Hypertension      Hyperlipidemia      Acute Myocardial Infarction      Coronary Stent      Personal History of Tobacco Use      ETOH Abuse      CAD (Coronary Atherosclerotic Disease)      Chronic atrial fibrillation      Pulmonary embolism      Anemia due to acute blood loss      Heart failure      Hypothyroid      Alcohol use disorder, mild, abuse      Hypothyroidism      Benign essential HTN      BPH (benign prostatic hyperplasia)      Smokes tobacco daily      Alcohol use disorder, mild, abuse      No significant past surgical history      S/P CABG x 1      S/P CABG (coronary artery bypass graft)      Coronary stent patent          MEDICATIONS  (STANDING):  apixaban 2.5 milliGRAM(s) Oral every 12 hours  atorvastatin 20 milliGRAM(s) Oral at bedtime  clopidogrel Tablet 75 milliGRAM(s) Oral daily  DULoxetine 30 milliGRAM(s) Oral daily  finasteride 5 milliGRAM(s) Oral daily  folic acid 1 milliGRAM(s) Oral daily  furosemide   Injectable 20 milliGRAM(s) IV Push every 8 hours  gabapentin 100 milliGRAM(s) Oral at bedtime  lactobacillus acidophilus 1 Tablet(s) Oral every 12 hours  levothyroxine 25 MICROGram(s) Oral daily  lidocaine   4% Patch 1 Patch Transdermal daily  melatonin 3 milliGRAM(s) Oral at bedtime  metoprolol tartrate 25 milliGRAM(s) Oral every 12 hours  nicotine -   7 mG/24Hr(s) Patch 1 Patch Transdermal daily  pantoprazole    Tablet 40 milliGRAM(s) Oral before breakfast  polyethylene glycol 3350 17 Gram(s) Oral daily  potassium chloride    Tablet ER 20 milliEquivalent(s) Oral daily  senna 2 Tablet(s) Oral at bedtime  sodium chloride 1 Gram(s) Oral every 12 hours  sucralfate 1 Gram(s) Oral two times a day  tamsulosin 0.4 milliGRAM(s) Oral at bedtime  thiamine 100 milliGRAM(s) Oral daily    MEDICATIONS  (PRN):  acetaminophen     Tablet .. 650 milliGRAM(s) Oral every 6 hours PRN Temp greater or equal to 38C (100.4F), Mild Pain (1 - 3)  aluminum hydroxide/magnesium hydroxide/simethicone Suspension 30 milliLiter(s) Oral every 4 hours PRN Dyspepsia  guaiFENesin Oral Liquid (Sugar-Free) 200 milliGRAM(s) Oral every 6 hours PRN Cough  hydrALAZINE Injectable 10 milliGRAM(s) IV Push every 4 hours PRN for systolic BP>170  methocarbamol 500 milliGRAM(s) Oral every 8 hours PRN Muscle Spasm  ondansetron Injectable 4 milliGRAM(s) IV Push every 8 hours PRN Nausea and/or Vomiting  oxyCODONE    IR 5 milliGRAM(s) Oral every 4 hours PRN Severe Pain (7 - 10)      OBJECTIVE    T(C): 36.7 (05-21-25 @ 20:12), Max: 37.1 (05-21-25 @ 15:50)  HR: 60 (05-21-25 @ 20:12) (59 - 72)  BP: 108/67 (05-21-25 @ 20:12) (108/67 - 154/89)  RR: 18 (05-21-25 @ 20:12) (17 - 20)  SpO2: 96% (05-21-25 @ 20:12) (93% - 100%)  Wt(kg): --  I&O's Summary    21 May 2025 07:01  -  21 May 2025 21:12  --------------------------------------------------------  IN: 0 mL / OUT: 150 mL / NET: -150 mL          REVIEW OF SYSTEMS:  Patient is  unable to provide any information/ROS  due to lethargy      PHYSICAL EXAM:  Appearance: NAD. VS past 24 hrs -as above   HEENT:   Moist oral mucosa. Conjunctiva clear b/l.   Neck : supple  Respiratory: Lungs CTAB.  Gastrointestinal:  Soft, nontender. No rebound. No rigidity. BS present	  Cardiovascular: RRR ,S1S2 present  Neurologic: Non-focal. Moving all extremities.  Extremities: No edema. No erythema. No calf tenderness.  Skin: No rashes, No ecchymoses, No cyanosis.	  wounds ,skin lesions-See skin assesment flow sheet   LABS:                        11.8   3.75  )-----------( 302      ( 21 May 2025 05:52 )             34.4     05-21    128[L]  |  95[L]  |  18  ----------------------------<  161[H]  3.7   |  24  |  1.30    Ca    8.9      21 May 2025 05:52  Phos  2.7     05-21  Mg     1.6     05-21    TPro  7.4  /  Alb  3.4  /  TBili  0.9  /  DBili  x   /  AST  11[L]  /  ALT  10[L]  /  AlkPhos  63  05-21    CAPILLARY BLOOD GLUCOSE        PT/INR - ( 21 May 2025 05:52 )   PT: 14.0 sec;   INR: 1.20 ratio         PTT - ( 20 May 2025 15:35 )  PTT:37.7 sec  Urinalysis Basic - ( 21 May 2025 05:52 )    Color: x / Appearance: x / SG: x / pH: x  Gluc: 161 mg/dL / Ketone: x  / Bili: x / Urobili: x   Blood: x / Protein: x / Nitrite: x   Leuk Esterase: x / RBC: x / WBC x   Sq Epi: x / Non Sq Epi: x / Bacteria: x        Urinalysis with Rflx Culture (collected 21 May 2025 04:53)      RADIOLOGY & ADDITIONAL TESTS:   reviewed elctronically  ASSESSMENT/PLAN: 	    45 minutes aggregate time was spent on this visit, 50% visit time spent in care co-ordination with other attendings and counselling patient .I have discussed care plan with patient / HCP/family member ,who expressed understanding of problems treatment and their effect and side effects, alternatives in details. I have asked if they have any questions and concerns and appropriately addressed them to best of my ability.

## 2025-05-21 NOTE — PATIENT PROFILE ADULT - NS PRO AD NO ADVANCE DIRECTIVE
You will take pain medication as prescribed Narcotic pain medicine can cause extreme nausea and constipation. Drink plenty of water and take diuretics (colace, Miralax) as needed. You can get them from your local pharmacy.    You will take Aspirin 325mg twice a day until you follow up with Dr. Castelan    You will remain non weight bearing of the Left lower extremity in a splint until you follow up with Dr. Castelan in the office    Splint precautions:  Keep Splint dry  Elevate extremity, can try and ice through the Splint   Do not stick anything into the Splint     You will follow up with Dr. Castelan in 1-2 weeks No

## 2025-05-21 NOTE — PROGRESS NOTE ADULT - SUBJECTIVE AND OBJECTIVE BOX
CHIEF COMPLAINT/ REASON FOR VISIT  .. Patient was seen to address the  issue listed under PROBLEM LIST which is located toward bottom of this note     BERNARDO DIGJUSTIN    [facility]    Allergies    No Known Allergies  Allergy Status Unknown    Intolerances        PAST MEDICAL & SURGICAL HISTORY:  Benign Essential Hypertension      Hyperlipidemia      Acute Myocardial Infarction      Coronary Stent      Personal History of Tobacco Use      ETOH Abuse      CAD (Coronary Atherosclerotic Disease)      Chronic atrial fibrillation      Pulmonary embolism      Anemia due to acute blood loss      Heart failure      Hypothyroid      Alcohol use disorder, mild, abuse      Hypothyroidism      Benign essential HTN      BPH (benign prostatic hyperplasia)      Smokes tobacco daily      Alcohol use disorder, mild, abuse      No significant past surgical history      S/P CABG x 1      S/P CABG (coronary artery bypass graft)      Coronary stent patent          FAMILY HISTORY:  FH: hypertension        Home Medications:  apixaban 2.5 mg oral tablet: 1 tab(s) orally 2 times a day (21 May 2025 08:24)  atorvastatin 20 mg oral tablet: 1 tab(s) orally once a day (at bedtime) (21 May 2025 08:24)  diclofenac topical: Apply 2 grams topically to both knees 2 times a day (21 May 2025 08:30)  DULoxetine 30 mg oral delayed release capsule: 1 cap(s) orally once a day (21 May 2025 08:24)  finasteride 5 mg oral tablet: 1 tab(s) orally once a day (21 May 2025 08:24)  gabapentin 100 mg oral capsule: 1 cap(s) orally once a day (at bedtime) (21 May 2025 08:24)  levothyroxine 25 mcg (0.025 mg) oral tablet: 1 tab(s) orally once a day (21 May 2025 08:24)  melatonin 3 mg oral tablet: 1 tab(s) orally once a day (at bedtime) (21 May 2025 08:31)  Metoprolol Tartrate 25 mg oral tablet: 1 tab(s) orally 2 times a day (21 May 2025 08:24)  Plavix 75 mg oral tablet: 1 tab(s) orally once a day (21 May 2025 08:31)  Senna 8.6 mg oral tablet: 2 tab(s) orally once a day (at bedtime) (21 May 2025 08:31)  Sodium Chloride 1000 mg oral tablet, soluble: 1 tab(s) orally every 8 hours for 10 days (21 May 2025 08:31)  sucralfate 1 g oral tablet: 1 tab(s) orally 2 times a day (21 May 2025 08:24)  tamsulosin 0.4 mg oral capsule: 1 cap(s) orally once a day (at bedtime) (21 May 2025 08:24)  Tylenol 325 mg oral tablet: 2 tab(s) orally every 12 hours as needed for  mild pain (21 May 2025 08:31)      MEDICATIONS  (STANDING):  apixaban 5 milliGRAM(s) Oral every 12 hours  atorvastatin 20 milliGRAM(s) Oral at bedtime  clopidogrel Tablet 75 milliGRAM(s) Oral daily  DULoxetine 30 milliGRAM(s) Oral daily  finasteride 5 milliGRAM(s) Oral daily  furosemide   Injectable 20 milliGRAM(s) IV Push every 8 hours  gabapentin 100 milliGRAM(s) Oral at bedtime  lactobacillus acidophilus 1 Tablet(s) Oral every 12 hours  levothyroxine 25 MICROGram(s) Oral daily  lidocaine   4% Patch 1 Patch Transdermal daily  metoprolol tartrate 25 milliGRAM(s) Oral every 12 hours  pantoprazole    Tablet 40 milliGRAM(s) Oral before breakfast  polyethylene glycol 3350 17 Gram(s) Oral daily  potassium chloride    Tablet ER 20 milliEquivalent(s) Oral daily  sucralfate 1 Gram(s) Oral two times a day  tamsulosin 0.4 milliGRAM(s) Oral at bedtime    MEDICATIONS  (PRN):  acetaminophen     Tablet .. 650 milliGRAM(s) Oral every 6 hours PRN Temp greater or equal to 38C (100.4F), Mild Pain (1 - 3)  aluminum hydroxide/magnesium hydroxide/simethicone Suspension 30 milliLiter(s) Oral every 4 hours PRN Dyspepsia  guaiFENesin Oral Liquid (Sugar-Free) 200 milliGRAM(s) Oral every 6 hours PRN Cough  hydrALAZINE Injectable 10 milliGRAM(s) IV Push every 4 hours PRN for systolic BP>170  melatonin 3 milliGRAM(s) Oral at bedtime PRN Insomnia  methocarbamol 500 milliGRAM(s) Oral every 8 hours PRN Muscle Spasm  ondansetron Injectable 4 milliGRAM(s) IV Push every 8 hours PRN Nausea and/or Vomiting  oxyCODONE    IR 5 milliGRAM(s) Oral every 4 hours PRN Severe Pain (7 - 10)      Diet, DASH/TLC:   Sodium & Cholesterol Restricted  Easy to Chew (EASYTOCHEW) (05-20-25 @ 20:35) [Active]          Vital Signs Last 24 Hrs  T(C): 36.8 (21 May 2025 08:33), Max: 36.8 (21 May 2025 08:33)  T(F): 98.3 (21 May 2025 08:33), Max: 98.3 (21 May 2025 08:33)  HR: 59 (21 May 2025 08:33) (59 - 107)  BP: 143/81 (21 May 2025 08:33) (122/92 - 180/117)  BP(mean): --  RR: 18 (21 May 2025 08:33) (17 - 22)  SpO2: 100% (21 May 2025 08:33) (93% - 100%)    Parameters below as of 21 May 2025 06:47  Patient On (Oxygen Delivery Method): nasal cannula  O2 Flow (L/min): 3                LABS:                        11.8   3.75  )-----------( 302      ( 21 May 2025 05:52 )             34.4     05-21    128[L]  |  95[L]  |  18  ----------------------------<  161[H]  3.7   |  24  |  1.30    Ca    8.9      21 May 2025 05:52  Phos  2.7     05-21  Mg     1.6     05-21    TPro  7.4  /  Alb  3.4  /  TBili  0.9  /  DBili  x   /  AST  11[L]  /  ALT  10[L]  /  AlkPhos  63  05-21    PT/INR - ( 21 May 2025 05:52 )   PT: 14.0 sec;   INR: 1.20 ratio         PTT - ( 20 May 2025 15:35 )  PTT:37.7 sec  Urinalysis Basic - ( 21 May 2025 05:52 )    Color: x / Appearance: x / SG: x / pH: x  Gluc: 161 mg/dL / Ketone: x  / Bili: x / Urobili: x   Blood: x / Protein: x / Nitrite: x   Leuk Esterase: x / RBC: x / WBC x   Sq Epi: x / Non Sq Epi: x / Bacteria: x            WBC:  WBC Count: 3.75 K/uL (05-21 @ 05:52)  WBC Count: 9.25 K/uL (05-20 @ 15:35)      MICROBIOLOGY:  RECENT CULTURES:              PT/INR - ( 21 May 2025 05:52 )   PT: 14.0 sec;   INR: 1.20 ratio         PTT - ( 20 May 2025 15:35 )  PTT:37.7 sec    Sodium:  Sodium: 128 mmol/L (05-21 @ 05:52)  Sodium: 128 mmol/L (05-20 @ 15:35)      1.30 mg/dL 05-21 @ 05:52  1.30 mg/dL 05-20 @ 15:35      Hemoglobin:  Hemoglobin: 11.8 g/dL (05-21 @ 05:52)  Hemoglobin: 12.6 g/dL (05-20 @ 15:35)      Platelets: Platelet Count - Automated: 302 K/uL (05-21 @ 05:52)  Platelet Count - Automated: 353 K/uL (05-20 @ 15:35)      LIVER FUNCTIONS - ( 21 May 2025 05:52 )  Alb: 3.4 g/dL / Pro: 7.4 g/dL / ALK PHOS: 63 U/L / ALT: 10 U/L / AST: 11 U/L / GGT: x             Urinalysis Basic - ( 21 May 2025 05:52 )    Color: x / Appearance: x / SG: x / pH: x  Gluc: 161 mg/dL / Ketone: x  / Bili: x / Urobili: x   Blood: x / Protein: x / Nitrite: x   Leuk Esterase: x / RBC: x / WBC x   Sq Epi: x / Non Sq Epi: x / Bacteria: x        RADIOLOGY & ADDITIONAL STUDIES:      MICROBIOLOGY:  RECENT CULTURES:

## 2025-05-21 NOTE — CONSULT NOTE ADULT - SUBJECTIVE AND OBJECTIVE BOX
[facility] [place]     Patient is a 79y old  Male who presents with a chief complaint of sob and leg selling (20 May 2025 23:12)      HPI:  79M with PMh of CAD s/p stents, HTN, HLD, tobacco use h/o etoh use disorder who presents with shortness of breath and elevated BP. patient reports that his main concern for his BP being elevated and noticed he was wheezing. he admits to smoking but states he usually does not wheeze. denies fevers or chills, no chest pain, or vomiting. also mentions some increased leg swelling recently Admit to monitored unit for cardiac monitoring, obtain echo to evaluate LVEF, intravenous diuresis as per card consult , monitor ins/outs, monitor renal profile and electrolytes closely ,send 3 sets of enzymes, O2 supply, serial chest xrays, monitor weights and oral intake of fluids, nutritionist consult Found to have pneumonia Admitted for septic workup and evaluation ,send blood and urine cx,serial lactate levels ,monitor vitals ,ivfs hydration ,monitor urine output and renal profile ,iv abx initiated  (20 May 2025 20:15)      HPI:    79y Male for Cardiology Consult    PAST MEDICAL & SURGICAL HISTORY:  Benign Essential Hypertension      Hyperlipidemia      Acute Myocardial Infarction      Coronary Stent      Personal History of Tobacco Use      ETOH Abuse      CAD (Coronary Atherosclerotic Disease)      Chronic atrial fibrillation      Pulmonary embolism      Anemia due to acute blood loss      Heart failure      Hypothyroid      Alcohol use disorder, mild, abuse      Hypothyroidism      Benign essential HTN      BPH (benign prostatic hyperplasia)      Smokes tobacco daily      Alcohol use disorder, mild, abuse      No significant past surgical history      S/P CABG x 1      S/P CABG (coronary artery bypass graft)      Coronary stent patent          FAMILY HISTORY:  FH: hypertension        SOCIAL HISTORY:   Alcohol:  Smoking:    Allergies    No Known Allergies  Allergy Status Unknown    Intolerances        MEDICATIONS  (STANDING):  apixaban 2.5 milliGRAM(s) Oral every 12 hours  atorvastatin 20 milliGRAM(s) Oral at bedtime  clopidogrel Tablet 75 milliGRAM(s) Oral daily  DULoxetine 30 milliGRAM(s) Oral daily  finasteride 5 milliGRAM(s) Oral daily  furosemide   Injectable 20 milliGRAM(s) IV Push every 8 hours  gabapentin 100 milliGRAM(s) Oral at bedtime  lactobacillus acidophilus 1 Tablet(s) Oral every 12 hours  levothyroxine 25 MICROGram(s) Oral daily  lidocaine   4% Patch 1 Patch Transdermal daily  metoprolol tartrate 25 milliGRAM(s) Oral every 12 hours  pantoprazole    Tablet 40 milliGRAM(s) Oral before breakfast  polyethylene glycol 3350 17 Gram(s) Oral daily  sucralfate 1 Gram(s) Oral two times a day  tamsulosin 0.4 milliGRAM(s) Oral at bedtime    MEDICATIONS  (PRN):  acetaminophen     Tablet .. 650 milliGRAM(s) Oral every 6 hours PRN Temp greater or equal to 38C (100.4F), Mild Pain (1 - 3)  aluminum hydroxide/magnesium hydroxide/simethicone Suspension 30 milliLiter(s) Oral every 4 hours PRN Dyspepsia  guaiFENesin Oral Liquid (Sugar-Free) 200 milliGRAM(s) Oral every 6 hours PRN Cough  hydrALAZINE Injectable 10 milliGRAM(s) IV Push every 6 hours PRN SBP ABOVE 170  melatonin 3 milliGRAM(s) Oral at bedtime PRN Insomnia  methocarbamol 500 milliGRAM(s) Oral every 8 hours PRN Muscle Spasm  ondansetron Injectable 4 milliGRAM(s) IV Push every 8 hours PRN Nausea and/or Vomiting  oxyCODONE    IR 5 milliGRAM(s) Oral every 4 hours PRN Severe Pain (7 - 10)      REVIEW OF SYSTEMS:  CONSTITUTIONAL: No fever, weight loss, chills, shakes, or fat  RESPIRATORY: No cough, wheezing, hemoptysis, or shortness of breath  CARDIOVASCULAR: No chest pain, dyspnea, palpitations, dizziness, syncope, paroxysmal nocturnal dyspnea, orthopnea, or arm or leg swelling  GASTROINTESTINAL: No abdominal  or epigastric pain, nausea, vomiting, hematemesis, diarrhea, constipation, melena or bright red bloo  NEUROLOGICAL: No headaches, memory loss, slurred speech, limb weakness, loss of strength, numbness, or tremors  SKIN: No itching, burning, rashes, or lesions  ENDOCRINE: No heat or cold intolerance, or hair loss  MUSCULOSKELETAL: No joint pain or swelling, muscle, back, or extremity pain  HEME/LYMPH: No easy bruising or bleeding gums  ALLERY AND IMMUNOLOGIC: No hives or rash.    Vital Signs Last 24 Hrs  T(C): 36.7 (20 May 2025 23:40), Max: 36.7 (20 May 2025 23:40)  T(F): 98.1 (20 May 2025 23:40), Max: 98.1 (20 May 2025 23:40)  HR: 72 (20 May 2025 23:40) (66 - 107)  BP: 122/92 (20 May 2025 23:40) (122/92 - 180/117)  BP(mean): --  RR: 17 (20 May 2025 23:40) (17 - 22)  SpO2: 93% (20 May 2025 23:40) (93% - 96%)    Parameters below as of 20 May 2025 23:40  Patient On (Oxygen Delivery Method): nasal cannula  O2 Flow (L/min): 3      PHYSICAL EXAM:  HEAD:  Atraumatic, Normocephalic  EYES: EOMI, PERRLA, conjunctiva and sclera clear  NECK: Supple and normal thyroid.  No JVD or carotid bruit.   HEART: S1, S2 regular , 1/6 soft ejection systolic murmur in mitral area , no thrill and no gallops .  PULMONARY: Bilateral vesicular breathing , few scattered ronchi and few scattered rales are present .  ABDOMEN: Soft nontender and positive bowl sounds   EXTREMITIES:  No clubbing, cyanosis, or pedal  edema  NEUROLOGICAL: AAOX3 , no focal deficit .    LABS:                        12.6   9.25  )-----------( 353      ( 20 May 2025 15:35 )             37.1     05-20    128[L]  |  91[L]  |  16  ----------------------------<  122[H]  4.0   |  27  |  1.30    Ca    9.6      20 May 2025 15:35  Mg     1.9     05-20    TPro  8.3  /  Alb  4.0  /  TBili  1.0  /  DBili  x   /  AST  18  /  ALT  13  /  AlkPhos  72  05-20        PT/INR - ( 20 May 2025 15:35 )   PT: 14.3 sec;   INR: 1.21 ratio         PTT - ( 20 May 2025 15:35 )  PTT:37.7 sec  Urinalysis Basic - ( 21 May 2025 04:53 )    Color: Yellow / Appearance: Clear / S.006 / pH: x  Gluc: x / Ketone: x  / Bili: Negative / Urobili: 0.2 mg/dL   Blood: x / Protein: Negative mg/dL / Nitrite: Negative   Leuk Esterase: Negative / RBC: x / WBC x   Sq Epi: x / Non Sq Epi: x / Bacteria: x      BNP      EKG:  ECHO:  IMAGING:    Assessment and Plan :     Will continue to follow during hospital course and give further recommendations as needed . Thanks for your referral . if any questions please contact me at office ([phone] [phone])  ZLUAY ZUNIGA MD [place] [id number]  [address]  OFFICE : [phone]  CELL : [phone]  CARDIOLOGY CONSULT :     Patient is a 79y old  Male who presents with a chief complaint of sob and leg selling (20 May 2025 23:12)      HPI:  79M with PMh of CAD s/p stents, HTN, HLD, tobacco use h/o etoh use disorder who presents with shortness of breath and elevated BP. patient reports that his main concern for his BP being elevated and noticed he was wheezing. he admits to smoking but states he usually does not wheeze. denies fevers or chills, no chest pain, or vomiting. also mentions some increased leg swelling recently Admit to monitored unit for cardiac monitoring, obtain echo to evaluate LVEF, intravenous diuresis as per card consult , monitor ins/outs, monitor renal profile and electrolytes closely ,send 3 sets of enzymes, O2 supply, serial chest xrays, monitor weights and oral intake of fluids, nutritionist consult Found to have pneumonia Admitted for septic workup and evaluation ,send blood and urine cx,serial lactate levels ,monitor vitals ,ivfs hydration ,monitor urine output and renal profile ,iv abx initiated  (20 May 2025 20:15) . Patient feeling better at the time of mu examination and SOB and leg swellings better and even cough and wheezing better . No chest pain . Patient ambulates with cane and can do slowly more than  100 feet . Patient getting tired over last few days . Patient understands all questions very well and answers appropriately . Patient history obtained from patient himself , RN , PA , PMD and other consultants notes .  ·   PAST MEDICAL & SURGICAL HISTORY:  Benign Essential Hypertension      Hyperlipidemia      Acute Myocardial Infarction      Coronary Stent      Personal History of Tobacco Use      ETOH Abuse      CAD (Coronary Atherosclerotic Disease)      Chronic atrial fibrillation      Pulmonary embolism      Anemia due to acute blood loss      Heart failure      Hypothyroid      Alcohol use disorder, mild, abuse      Hypothyroidism      Benign essential HTN      BPH (benign prostatic hyperplasia)      Smokes tobacco daily      Alcohol use disorder, mild, abuse      No significant past surgical history      S/P CABG x 1      S/P CABG (coronary artery bypass graft)      Coronary stent patent          FAMILY HISTORY:  FH: hypertension        SOCIAL HISTORY:   Alcohol: Patient drinks alcohol socially occasionally   Smoking: Patient smokes 6-8 cigarettes daily     Allergies    No Known Allergies  Allergy Status Unknown    Intolerances        MEDICATIONS  (STANDING):  apixaban 2.5 milliGRAM(s) Oral every 12 hours  atorvastatin 20 milliGRAM(s) Oral at bedtime  clopidogrel Tablet 75 milliGRAM(s) Oral daily  DULoxetine 30 milliGRAM(s) Oral daily  finasteride 5 milliGRAM(s) Oral daily  furosemide   Injectable 20 milliGRAM(s) IV Push every 8 hours  gabapentin 100 milliGRAM(s) Oral at bedtime  lactobacillus acidophilus 1 Tablet(s) Oral every 12 hours  levothyroxine 25 MICROGram(s) Oral daily  lidocaine   4% Patch 1 Patch Transdermal daily  metoprolol tartrate 25 milliGRAM(s) Oral every 12 hours  pantoprazole    Tablet 40 milliGRAM(s) Oral before breakfast  polyethylene glycol 3350 17 Gram(s) Oral daily  sucralfate 1 Gram(s) Oral two times a day  tamsulosin 0.4 milliGRAM(s) Oral at bedtime    MEDICATIONS  (PRN):  acetaminophen     Tablet .. 650 milliGRAM(s) Oral every 6 hours PRN Temp greater or equal to 38C (100.4F), Mild Pain (1 - 3)  aluminum hydroxide/magnesium hydroxide/simethicone Suspension 30 milliLiter(s) Oral every 4 hours PRN Dyspepsia  guaiFENesin Oral Liquid (Sugar-Free) 200 milliGRAM(s) Oral every 6 hours PRN Cough  hydrALAZINE Injectable 10 milliGRAM(s) IV Push every 6 hours PRN SBP ABOVE 170  melatonin 3 milliGRAM(s) Oral at bedtime PRN Insomnia  methocarbamol 500 milliGRAM(s) Oral every 8 hours PRN Muscle Spasm  ondansetron Injectable 4 milliGRAM(s) IV Push every 8 hours PRN Nausea and/or Vomiting  oxyCODONE    IR 5 milliGRAM(s) Oral every 4 hours PRN Severe Pain (7 - 10)      REVIEW OF SYSTEMS:  CONSTITUTIONAL: No fever .  RESPIRATORY: Patient has significant  cough, wheezing and  shortness of breath  CARDIOVASCULAR: No chest pain,  palpitations, dizziness, syncope, paroxysmal nocturnal dyspnea, or arm  swelling but has SOB and leg swellings .  GASTROINTESTINAL: No abdominal  or epigastric pain, nausea, vomiting, hematemesis, diarrhea, constipation, melena or bright red blood .  NEUROLOGICAL: No headaches, memory loss, slurred speech, limb weakness, loss of strength, numbness, or tremors  SKIN: No itching, burning, rashes, or lesions  ENDOCRINE: No heat or cold intolerance, or hair loss  MUSCULOSKELETAL: Patient has mild chronic arthritis .  HEME/LYMPH: No easy bruising or bleeding gums  ALLERGY AND IMMUNOLOGIC: No hives or rash.    Vital Signs Last 24 Hrs  T(C): 36.7 (20 May 2025 23:40), Max: 36.7 (20 May 2025 23:40)  T(F): 98.1 (20 May 2025 23:40), Max: 98.1 (20 May 2025 23:40)  HR: 72 (20 May 2025 23:40) (66 - 107)  BP: 122/92 (20 May 2025 23:40) (122/92 - 180/117)  BP(mean): --  RR: 17 (20 May 2025 23:40) (17 - 22)  SpO2: 93% (20 May 2025 23:40) (93% - 96%)    Parameters below as of 20 May 2025 23:40  Patient On (Oxygen Delivery Method): nasal cannula  O2 Flow (L/min): 3      PHYSICAL EXAM:  HEAD:  Atraumatic, Normocephalic  EYES: EOMI, PERRLA, conjunctiva and sclera clear  NECK: Supple and normal thyroid.  No JVD or carotid bruit.   HEART: S1, S2 regular , 1/6 soft ejection systolic murmur in mitral area , no thrill and no gallops .  PULMONARY: Bilateral vesicular breathing , few scattered rhonchi and few scattered rales are present .  ABDOMEN: Soft nontender and positive bowl sounds   EXTREMITIES:  No clubbing, cyanosis, and has bilateral  pedal  edema  NEUROLOGICAL: AAOX3 , no focal deficit .  Skin : No rashes .  Musculoskeletal : No joint swellings .    LABS:                        12.6   9.25  )-----------( 353      ( 20 May 2025 15:35 )             37.1     05-20    128[L]  |  91[L]  |  16  ----------------------------<  122[H]  4.0   |  27  |  1.30    Ca    9.6      20 May 2025 15:35  Mg     1.9     05-20    TPro  8.3  /  Alb  4.0  /  TBili  1.0  /  DBili  x   /  AST  18  /  ALT  13  /  AlkPhos  72  05-20        PT/INR - ( 20 May 2025 15:35 )   PT: 14.3 sec;   INR: 1.21 ratio         PTT - ( 20 May 2025 15:35 )  PTT:37.7 sec  Urinalysis Basic - ( 21 May 2025 04:53 )    Color: Yellow / Appearance: Clear / S.006 / pH: x  Gluc: x / Ketone: x  / Bili: Negative / Urobili: 0.2 mg/dL   Blood: x / Protein: Negative mg/dL / Nitrite: Negative   Leuk Esterase: Negative / RBC: x / WBC x   Sq Epi: x / Non Sq Epi: x / Bacteria: x            EKG: NSR and non specific ST-T changes .  Assessment and Plan :   79M with PMh of CAD s/p stents, HTN, HLD, tobacco use h/o etoh use disorder who presents with shortness of breath and elevated BP. patient reports that his main concern for his BP being elevated and noticed he was wheezing. he admits to smoking but states he usually does not wheeze. denies fevers or chills, no chest pain, or vomiting. also mentions some increased leg swelling recently Admit to monitored unit for cardiac monitoring, obtain echo to evaluate LVEF, intravenous diuresis as per card consult , monitor ins/outs, monitor renal profile and electrolytes closely ,send 3 sets of enzymes, O2 supply, serial chest xrays, monitor weights and oral intake of fluids, nutritionist consult Found to have pneumonia Admitted for septic workup and evaluation ,send blood and urine cx,serial lactate levels ,monitor vitals ,ivfs hydration ,monitor urine output and renal profile ,iv abx initiated  (20 May 2025 20:15) . Patient feeling better at the time of mu examination and SOB and leg swellings better and even cough and wheezing better . No chest pain . Patient ambulates with cane and can do slowly more than  100 feet . Patient getting tired over last few days . Patient understands all questions very well and answers appropriately . Patient history obtained from patient himself , RN , PA , PMD and other consultants notes .  Patient has acute on chronic diastolic heart failure along with uncontrolled hypertension so continue I/V Lasix , metoprolol and losartan and hydralazine . Will send Troponin I at frequent intervals to R/[facility] . Will get echocardiogram . Patient has COPD exacerbation and continue bronchodilators and also possible pneumonia and continue I/V  antibiotics as per PMD . Monitor hemoglobin and electrolytes . Patient has paroxysmal atrial fibrillation and stable heart rate . Continue Eliquis . Rest of medications as such . Patient has H/O CAD and coronary artery stents and CABG .   Will continue to follow during hospital course and give further recommendations as needed . Thanks for your referral . if any questions please contact me at office ([phone] cell [phone])

## 2025-05-21 NOTE — PROGRESS NOTE ADULT - NS ATTEND AMEND GEN_ALL_CORE FT
Patient seen and examined this afternoon.  Still in the ER at the time of my evaluation.  Remains comfortable in no acute distress.  Mild shortness of breath.  Abdomen remains soft, nontender, nondistended with positive bowel sounds in all 4 quadrants.  Epigastric, subxiphoid hernia present likely resulting from his previous median sternotomy and precordial drains given history of CABG.  Defect measures approximately 3 x 4 cm.  Hernia remains soft and easily reducible.  In light of the patient's ongoing CHF he remains a poor surgical candidate to undergo repair at this time.  If clinically optimized the patient may be an appropriate candidate for general anesthesia and surgical repair.  He had previously been seen as an outpatient for hernia with recommendations to undergo repair at a tertiary care hospital given his extensive comorbid conditions and cardiac history.  Unfortunately the patient never pursued repair at that time.  Signs and symptoms of incarceration/strangulation/obstruction have been reviewed with the patient.  Should such symptoms present, urgent medical attention should be sought.  Contact my office immediately and or head to your nearest emergency room for evaluation and treatment.  This may require immediate surgical repair.  Continue medical management and optimization.

## 2025-05-21 NOTE — CARE COORDINATION ASSESSMENT. - NSCAREPROVIDERS_GEN_ALL_CORE_FT
CARE PROVIDERS:  Accepting Physician: Adalgisa Marcos  Administration: Kaleb Elizondo  Administration: Saundra Ching  Admitting: Adalgisa Marcos  Attending: Adalgisa Marcos  Cardiology Technician: Ana Gaspar  Consultant: Dontae Light  Consultant: Abebe Brown  Consultant: Inocencio Tovar  Consultant: Kevin Crystal  Consultant: Khan, Fahrina  Consultant: Clarita Ritter  Consultant: Adam Burris  Consultant: Forrest Vasquez  Consultant: Radha Guillaume  Covering Team: Demetrice Michael ED Attending: Luis Carlos Valles  ED Nurse: Luis Carlos Hill  ED Nurse 2: Elio Mcdonald  Nurse: Carleen Serrano  Nurse: Luis Carlos Hill  Nurse: Behringer, Megan  Oncology: Russ Aguillon  Ordered: Doctor, Unknown  Outpatient Provider: Luis Carlos Cason  Outpatient Provider: Adalgisa Marcos  Outpatient Provider: Roverto Flores  Outpatient Provider: Pahlavan, Mohsen  Outpatient Provider: Gerry Bell  Outpatient Provider: Gerry Bell  Override: Eli Pickett  PCA/Nursing Assistant: Tricia Cortes  Research: Jie Hathaway  : Padmaja Plata  Speech Pathology: Portia Villalba// Supp. Assoc.: Tiffanie Alvarado

## 2025-05-21 NOTE — PATIENT PROFILE ADULT - FALL HARM RISK - HARM RISK INTERVENTIONS
Assistance with ambulation/Assistance OOB with selected safe patient handling equipment/Communicate Risk of Fall with Harm to all staff/Tailored Fall Risk Interventions/Visual Cue: Yellow wristband and red socks/Bed in lowest position, wheels locked, appropriate side rails in place/Call bell, personal items and telephone in reach/Instruct patient to call for assistance before getting out of bed or chair/Non-slip footwear when patient is out of bed/[place] to call system/Physically safe environment - no spills, clutter or unnecessary equipment/Purposeful Proactive Rounding/Room/bathroom lighting operational, light cord in reach

## 2025-05-21 NOTE — PATIENT PROFILE ADULT - ARE SIGNIFICANT INDICATORS COMPLETE.
Name of Patient : CLARISA DAWN  MRN: [id number]  Date of visit: 05-25-22 @ 14:06      Subjective: Patient seen and examined. No new events except as noted.   Patient seen earlier this AM. S/P PEG replacement per GI.       REVIEW OF SYSTEMS:  Unable to obtain ROS    MEDICATIONS:  MEDICATIONS  (STANDING):  ascorbic acid 500 milliGRAM(s) Oral daily  cholecalciferol 400 Unit(s) Oral daily  digoxin     Tablet 125 MICROGram(s) Oral daily  simvastatin 40 milliGRAM(s) Oral at bedtime  sodium chloride 0.9%. 1000 milliLiter(s) (50 mL/Hr) IV Continuous <Continuous>      PHYSICAL EXAM:  T(C): 36.7 (05-25-22 @ 09:16), Max: 36.9 (05-24-22 @ 19:41)  HR: 69 (05-25-22 @ 09:16) (66 - 69)  BP: 111/68 (05-25-22 @ 09:16) (108/67 - 141/67)  RR: 18 (05-25-22 @ 09:16) (18 - 18)  SpO2: 94% (05-25-22 @ 09:16) (94% - 99%)  Wt(kg): --  I&O's Summary    24 May 2022 07:01  -  25 May 2022 07:00  --------------------------------------------------------  IN: 1074 mL / OUT: 0 mL / NET: 1074 mL    25 May 2022 07:01  -  25 May 2022 14:06  --------------------------------------------------------  IN: 237 mL / OUT: 0 mL / NET: 237 mL          Appearance: Awake, Disheveled female  HEENT:  PERRLA   Lymphatic: No lymphadenopathy   Cardiovascular: Normal S1 S2, no JVD  Respiratory: normal effort , clear  Gastrointestinal:  PEG replaced  Skin: No rashes grossly  Psychiatry:  Unable to obtain - Pt non-verbal   Musculoskeletal: RUE in mitten; No edema      05-24-22 @ 07:01  -  05-25-22 @ 07:00  --------------------------------------------------------  IN: 1074 mL / OUT: 0 mL / NET: 1074 mL    05-25-22 @ 07:01  -  05-25-22 @ 14:06  --------------------------------------------------------  IN: 237 mL / OUT: 0 mL / NET: 237 mL                              14.2   7.49  )-----------( 153      ( 25 May 2022 07:15 )             44.0               05-25    144  |  107  |  24<H>  ----------------------------<  77  3.8   |  23  |  0.47<L>    Ca    9.1      25 May 2022 07:12    TPro  7.0  /  Alb  4.2  /  TBili  0.5  /  DBili  x   /  AST  19  /  ALT  13  /  AlkPhos  67  05-25    PT/INR - ( 23 May 2022 22:45 )   PT: 12.0 sec;   INR: 1.03 ratio         PTT - ( 23 May 2022 22:45 )  PTT:29.7 sec                      Name of Patient : CLARISA DAWN  MRN: [id number]  Date of visit: 05-25-22 @ 14:06      Subjective: Patient seen and examined. No new events except as noted.   Patient seen earlier this AM. S/P PEG replacement per GI.       REVIEW OF SYSTEMS:  Unable to obtain ROS    MEDICATIONS:  MEDICATIONS  (STANDING):  ascorbic acid 500 milliGRAM(s) Oral daily  cholecalciferol 400 Unit(s) Oral daily  digoxin     Tablet 125 MICROGram(s) Oral daily  simvastatin 40 milliGRAM(s) Oral at bedtime  sodium chloride 0.9%. 1000 milliLiter(s) (50 mL/Hr) IV Continuous <Continuous>      PHYSICAL EXAM:  T(C): 36.7 (05-25-22 @ 09:16), Max: 36.9 (05-24-22 @ 19:41)  HR: 69 (05-25-22 @ 09:16) (66 - 69)  BP: 111/68 (05-25-22 @ 09:16) (108/67 - 141/67)  RR: 18 (05-25-22 @ 09:16) (18 - 18)  SpO2: 94% (05-25-22 @ 09:16) (94% - 99%)  Wt(kg): --  I&O's Summary    24 May 2022 07:01  -  25 May 2022 07:00  --------------------------------------------------------  IN: 1074 mL / OUT: 0 mL / NET: 1074 mL    25 May 2022 07:01  -  25 May 2022 14:06  --------------------------------------------------------  IN: 237 mL / OUT: 0 mL / NET: 237 mL          Appearance: Awake, Disheveled female  HEENT:  PERRLA   Lymphatic: No lymphadenopathy   Cardiovascular: Normal S1 S2, no JVD  Respiratory: normal effort , clear  Gastrointestinal:  PEG replaced  Skin: No rashes grossly  Psychiatry:  Unable to obtain - Pt non-verbal   Musculoskeletal: RUE in mitten; No edema      05-24-22 @ 07:01  -  05-25-22 @ 07:00  --------------------------------------------------------  IN: 1074 mL / OUT: 0 mL / NET: 1074 mL    05-25-22 @ 07:01  -  05-25-22 @ 14:06  --------------------------------------------------------  IN: 237 mL / OUT: 0 mL / NET: 237 mL                              14.2   7.49  )-----------( 153      ( 25 May 2022 07:15 )             44.0               05-25    144  |  107  |  24<H>  ----------------------------<  77  3.8   |  23  |  0.47<L>    Ca    9.1      25 May 2022 07:12    TPro  7.0  /  Alb  4.2  /  TBili  0.5  /  DBili  x   /  AST  19  /  ALT  13  /  AlkPhos  67  05-25    PT/INR - ( 23 May 2022 22:45 )   PT: 12.0 sec;   INR: 1.03 ratio         PTT - ( 23 May 2022 22:45 )  PTT:29.7 sec                 < from: Xray Feeding Tube Check SI (05.24.22 @ 15:42) >  IMPRESSION:  Gastrostomy tube with tip in the region of the proximal stomach. Contrast   is  visualized in the stomach. No evidence of contrast extravasation.    Nonobstructive bowel gas pattern.      < end of copied text >       Yes

## 2025-05-22 ENCOUNTER — RESULT REVIEW (OUTPATIENT)
Age: 80
End: 2025-05-22

## 2025-05-22 LAB
ANION GAP SERPL CALC-SCNC: 7 MMOL/L — SIGNIFICANT CHANGE UP (ref 5–17)
BUN SERPL-MCNC: 28 MG/DL — HIGH (ref 7–23)
CALCIUM SERPL-MCNC: 8.9 MG/DL — SIGNIFICANT CHANGE UP (ref 8.5–10.1)
CHLORIDE SERPL-SCNC: 96 MMOL/L — SIGNIFICANT CHANGE UP (ref 96–108)
CO2 SERPL-SCNC: 30 MMOL/L — SIGNIFICANT CHANGE UP (ref 22–31)
CREAT SERPL-MCNC: 1.6 MG/DL — HIGH (ref 0.5–1.3)
EGFR: 44 ML/MIN/1.73M2 — LOW
EGFR: 44 ML/MIN/1.73M2 — LOW
GLUCOSE SERPL-MCNC: 116 MG/DL — HIGH (ref 70–99)
HCT VFR BLD CALC: 31.2 % — LOW (ref 39–50)
HGB BLD-MCNC: 10.5 G/DL — LOW (ref 13–17)
MCHC RBC-ENTMCNC: 30.5 PG — SIGNIFICANT CHANGE UP (ref 27–34)
MCHC RBC-ENTMCNC: 33.7 G/DL — SIGNIFICANT CHANGE UP (ref 32–36)
MCV RBC AUTO: 90.7 FL — SIGNIFICANT CHANGE UP (ref 80–100)
NRBC BLD AUTO-RTO: 0 /100 WBCS — SIGNIFICANT CHANGE UP (ref 0–0)
NT-PROBNP SERPL-SCNC: 4149 PG/ML — HIGH (ref 0–450)
PLATELET # BLD AUTO: 298 K/UL — SIGNIFICANT CHANGE UP (ref 150–400)
POTASSIUM SERPL-MCNC: 3.7 MMOL/L — SIGNIFICANT CHANGE UP (ref 3.5–5.3)
POTASSIUM SERPL-SCNC: 3.7 MMOL/L — SIGNIFICANT CHANGE UP (ref 3.5–5.3)
RBC # BLD: 3.44 M/UL — LOW (ref 4.2–5.8)
RBC # FLD: 15.3 % — HIGH (ref 10.3–14.5)
SODIUM SERPL-SCNC: 133 MMOL/L — LOW (ref 135–145)
WBC # BLD: 7.34 K/UL — SIGNIFICANT CHANGE UP (ref 3.8–10.5)
WBC # FLD AUTO: 7.34 K/UL — SIGNIFICANT CHANGE UP (ref 3.8–10.5)

## 2025-05-22 RX ORDER — FUROSEMIDE 10 MG/ML
20 INJECTION INTRAMUSCULAR; INTRAVENOUS DAILY
Refills: 0 | Status: DISCONTINUED | OUTPATIENT
Start: 2025-05-22 | End: 2025-05-23

## 2025-05-22 RX ADMIN — Medication 3 MILLIGRAM(S): at 21:26

## 2025-05-22 RX ADMIN — APIXABAN 2.5 MILLIGRAM(S): 2.5 TABLET, FILM COATED ORAL at 17:10

## 2025-05-22 RX ADMIN — Medication 1 GRAM(S): at 17:10

## 2025-05-22 RX ADMIN — METOPROLOL SUCCINATE 25 MILLIGRAM(S): 50 TABLET, EXTENDED RELEASE ORAL at 17:10

## 2025-05-22 RX ADMIN — ATORVASTATIN CALCIUM 20 MILLIGRAM(S): 80 TABLET, FILM COATED ORAL at 21:26

## 2025-05-22 RX ADMIN — FUROSEMIDE 20 MILLIGRAM(S): 10 INJECTION INTRAMUSCULAR; INTRAVENOUS at 05:08

## 2025-05-22 RX ADMIN — Medication 20 MILLIEQUIVALENT(S): at 11:17

## 2025-05-22 RX ADMIN — FUROSEMIDE 20 MILLIGRAM(S): 10 INJECTION INTRAMUSCULAR; INTRAVENOUS at 13:15

## 2025-05-22 RX ADMIN — Medication 40 MILLIGRAM(S): at 05:09

## 2025-05-22 RX ADMIN — GABAPENTIN 100 MILLIGRAM(S): 400 CAPSULE ORAL at 21:26

## 2025-05-22 RX ADMIN — Medication 100 MILLIGRAM(S): at 11:17

## 2025-05-22 RX ADMIN — Medication 1 GRAM(S): at 05:09

## 2025-05-22 RX ADMIN — FINASTERIDE 5 MILLIGRAM(S): 1 TABLET, FILM COATED ORAL at 11:16

## 2025-05-22 RX ADMIN — Medication 1 TABLET(S): at 17:10

## 2025-05-22 RX ADMIN — CLOPIDOGREL BISULFATE 75 MILLIGRAM(S): 75 TABLET, FILM COATED ORAL at 11:17

## 2025-05-22 RX ADMIN — TAMSULOSIN HYDROCHLORIDE 0.4 MILLIGRAM(S): 0.4 CAPSULE ORAL at 21:26

## 2025-05-22 RX ADMIN — Medication 1 TABLET(S): at 05:08

## 2025-05-22 RX ADMIN — APIXABAN 2.5 MILLIGRAM(S): 2.5 TABLET, FILM COATED ORAL at 05:08

## 2025-05-22 RX ADMIN — Medication 25 MICROGRAM(S): at 05:09

## 2025-05-22 RX ADMIN — METOPROLOL SUCCINATE 25 MILLIGRAM(S): 50 TABLET, EXTENDED RELEASE ORAL at 05:09

## 2025-05-22 RX ADMIN — Medication 2 TABLET(S): at 21:26

## 2025-05-22 RX ADMIN — DULOXETINE 30 MILLIGRAM(S): 20 CAPSULE, DELAYED RELEASE ORAL at 11:17

## 2025-05-22 RX ADMIN — FOLIC ACID 1 MILLIGRAM(S): 1 TABLET ORAL at 11:16

## 2025-05-22 NOTE — DIETITIAN INITIAL EVALUATION ADULT - DIET TYPE
DASH/TLC (sodium and cholesterol restricted diet)/1200ml Diet texture/liquid consistency per SLP/DASH/TLC (sodium and cholesterol restricted diet)/1200ml

## 2025-05-22 NOTE — PROGRESS NOTE ADULT - SUBJECTIVE AND OBJECTIVE BOX
[facility] [place]       HPI:  79M with PMh of CAD s/p stents, HTN, HLD, tobacco use h/o etoh use disorder who presents with shortness of breath and elevated BP. patient reports that his main concern for his BP being elevated and noticed he was wheezing. he admits to smoking but states he usually does not wheeze. denies fevers or chills, no chest pain, or vomiting. also mentions some increased leg swelling recently Admit to monitored unit for cardiac monitoring, obtain echo to evaluate LVEF, intravenous diuresis as per card consult , monitor ins/outs, monitor renal profile and electrolytes closely ,send 3 sets of enzymes, O2 supply, serial chest xrays, monitor weights and oral intake of fluids, nutritionist consult Found to have pneumonia Admitted for septic workup and evaluation ,send blood and urine cx,serial lactate levels ,monitor vitals ,ivfs hydration ,monitor urine output and renal profile ,iv abx initiated  (20 May 2025 20:15)        SUBJECTIVE:      ALLERGIES:  Allergies    No Known Allergies  Allergy Status Unknown    Intolerances          MEDICATIONS  (STANDING):  apixaban 2.5 milliGRAM(s) Oral every 12 hours  atorvastatin 20 milliGRAM(s) Oral at bedtime  clopidogrel Tablet 75 milliGRAM(s) Oral daily  DULoxetine 30 milliGRAM(s) Oral daily  finasteride 5 milliGRAM(s) Oral daily  folic acid 1 milliGRAM(s) Oral daily  furosemide   Injectable 20 milliGRAM(s) IV Push daily  gabapentin 100 milliGRAM(s) Oral at bedtime  lactobacillus acidophilus 1 Tablet(s) Oral every 12 hours  levothyroxine 25 MICROGram(s) Oral daily  lidocaine   4% Patch 1 Patch Transdermal daily  melatonin 3 milliGRAM(s) Oral at bedtime  metoprolol tartrate 25 milliGRAM(s) Oral every 12 hours  nicotine -   7 mG/24Hr(s) Patch 1 Patch Transdermal daily  pantoprazole    Tablet 40 milliGRAM(s) Oral before breakfast  polyethylene glycol 3350 17 Gram(s) Oral daily  potassium chloride    Tablet ER 20 milliEquivalent(s) Oral daily  senna 2 Tablet(s) Oral at bedtime  sodium chloride 1 Gram(s) Oral every 12 hours  sucralfate 1 Gram(s) Oral two times a day  tamsulosin 0.4 milliGRAM(s) Oral at bedtime  thiamine 100 milliGRAM(s) Oral daily    MEDICATIONS  (PRN):  acetaminophen     Tablet .. 650 milliGRAM(s) Oral every 6 hours PRN Temp greater or equal to 38C (100.4F), Mild Pain (1 - 3)  aluminum hydroxide/magnesium hydroxide/simethicone Suspension 30 milliLiter(s) Oral every 4 hours PRN Dyspepsia  guaiFENesin Oral Liquid (Sugar-Free) 200 milliGRAM(s) Oral every 6 hours PRN Cough  hydrALAZINE Injectable 10 milliGRAM(s) IV Push every 4 hours PRN for systolic BP>170  methocarbamol 500 milliGRAM(s) Oral every 8 hours PRN Muscle Spasm  ondansetron Injectable 4 milliGRAM(s) IV Push every 8 hours PRN Nausea and/or Vomiting  oxyCODONE    IR 5 milliGRAM(s) Oral every 4 hours PRN Severe Pain (7 - 10)      REVIEW OF SYSTEMS:  CONSTITUTIONAL: No fever,  RESPIRATORY: No cough, wheezing, shortness of breath  CARDIOVASCULAR: No chest pain, dyspnea, palpitations, dizziness, syncope, paroxysmal nocturnal dyspnea, orthopnea, or arm or leg swelling  GASTROINTESTINAL: No abdominal  or epigastric pain, nausea, vomiting,  diarrhea  NEUROLOGICAL: No headaches,  loss of strength, numbness, or tremors    Vital Signs Last 24 Hrs  T(C): 36.6 (23 May 2025 12:27), Max: 36.6 (22 May 2025 19:56)  T(F): 97.8 (23 May 2025 12:27), Max: 97.9 (22 May 2025 19:56)  HR: 58 (23 May 2025 12:27) (56 - 61)  BP: 111/69 (23 May 2025 12:27) (95/62 - 136/79)  BP(mean): --  RR: 18 (23 May 2025 12:27) (18 - 18)  SpO2: 97% (23 May 2025 12:27) (97% - 98%)    Parameters below as of 23 May 2025 12:27  Patient On (Oxygen Delivery Method): nasal cannula        PHYSICAL EXAM:  HEAD:  Atraumatic, Normocephalic  NECK: Supple and normal thyroid.  No JVD or carotid bruit.   HEART: S1, S2 regular , 1/6 soft ejection systolic murmur in mitral area , no thrill and no gallops .  PULMONARY: Bilateral vesicular breathing , few scattered ronchi and few scattered rales are present .  ABDOMEN: Soft nontender and positive bowl sounds   EXTREMITIES:  No clubbing, cyanosis, or pedal  edema  NEUROLOGICAL: AAOX3 , no focal deficit .    LABS:                        11.1   7.92  )-----------( 312      ( 23 May 2025 06:30 )             33.6     05-23    132[L]  |  94[L]  |  29[H]  ----------------------------<  102[H]  3.8   |  33[H]  |  1.50[H]    Ca    9.4      23 May 2025 06:30  Phos  3.8     05-23    TPro  7.0  /  Alb  3.5  /  TBili  0.6  /  DBili  x   /  AST  10[L]  /  ALT  12  /  AlkPhos  54  05-23        PT/INR - ( 23 May 2025 06:30 )   PT: 13.8 sec;   INR: 1.18 ratio           Urinalysis Basic - ( 23 May 2025 06:30 )    Color: x / Appearance: x / SG: x / pH: x  Gluc: 102 mg/dL / Ketone: x  / Bili: x / Urobili: x   Blood: x / Protein: x / Nitrite: x   Leuk Esterase: x / RBC: x / WBC x   Sq Epi: x / Non Sq Epi: x / Bacteria: x      BNP      EKG:  ECHO:  IMAGING:    Assessment/Plan    Will continue to follow during hospital course and give further recommendations as needed . Thanks for your referral . if any questions please contact me at office ([phone] [phone])

## 2025-05-22 NOTE — DIETITIAN INITIAL EVALUATION ADULT - OTHER INFO
79M with PMh of CAD s/p stents, HTN, HLD, tobacco use h/o etoh use disorder who presents with shortness of breath and elevated BP.    Pt A+Ox4 at visit. Pt report significant improvement in SOB. IV lasix rx. Dash/TLC, easy to chew diet rx. Pt reports tolerated well with relatively good appetite/po intake; % documented on 5/21 per EMR. ~50% breakfast entree consumed 5/22 per plate waste observation. No report difficulty chewing/swallowing. Pta from [facility] on regular diet with 1L po FR. Hx hyponatremia on 1gm NaCl tablet q 12hrs. Recommend continuing per MD discretion. Per pt avoids added salt in diet however does order food out often. HTN on admission. Recommend continuing Dash/TLC diet at this time plus 1.2L po FR. UBW 205lbs. Left ankle/leg 1+edema, right leg 1+edema. Salt tablets per MD order. Declined oral nutritional supplements. Food preferences obtained. Declined diet education; verbal heart healthy & po FR reinforced.  79M with PMh of CAD s/p stents, HTN, HLD, tobacco use h/o etoh use disorder who presents with shortness of breath and elevated BP.    Pt A+Ox4 at visit. Pt report significant improvement in SOB. IV lasix rx. Dash/TLC, easy to chew diet rx. Pt reports tolerated well with relatively good appetite/po intake; % documented on 5/21 per EMR. ~50% breakfast entree consumed 5/22 per plate waste observation. No report difficulty chewing/swallowing. SLP evaluation pending r/o asp pna.  Pta from [facility] on regular diet with 1L po FR. Hx hyponatremia on 1gm NaCl tablet q 12hrs. Recommend continuing per MD discretion. Per pt avoids added salt in diet however does order food out often. HTN on admission. Recommend continuing Dash/TLC diet (diet texture per SLP) plus 1.2L po FR. UBW 205lbs. Left ankle/leg 1+edema, right leg 1+edema. Salt tablets per MD order. Declined oral nutritional supplements. Food preferences obtained. Declined diet education; verbal heart healthy & po FR reinforced.

## 2025-05-22 NOTE — PROGRESS NOTE ADULT - SUBJECTIVE AND OBJECTIVE BOX
Patient is a 79y Male whom presented to the hospital with hyponatremia     PAST MEDICAL & SURGICAL HISTORY:  Benign Essential Hypertension      Hyperlipidemia      Acute Myocardial Infarction      Coronary Stent      Personal History of Tobacco Use      ETOH Abuse      CAD (Coronary Atherosclerotic Disease)      Chronic atrial fibrillation      Pulmonary embolism      Anemia due to acute blood loss      Heart failure      Hypothyroid      Alcohol use disorder, mild, abuse      Hypothyroidism      Benign essential HTN      BPH (benign prostatic hyperplasia)      Smokes tobacco daily      Alcohol use disorder, mild, abuse      No significant past surgical history      S/P CABG x 1      S/P CABG (coronary artery bypass graft)      Coronary stent patent          MEDICATIONS  (STANDING):  apixaban 2.5 milliGRAM(s) Oral every 12 hours  atorvastatin 20 milliGRAM(s) Oral at bedtime  clopidogrel Tablet 75 milliGRAM(s) Oral daily  DULoxetine 30 milliGRAM(s) Oral daily  finasteride 5 milliGRAM(s) Oral daily  folic acid 1 milliGRAM(s) Oral daily  furosemide   Injectable 20 milliGRAM(s) IV Push every 8 hours  gabapentin 100 milliGRAM(s) Oral at bedtime  lactobacillus acidophilus 1 Tablet(s) Oral every 12 hours  levothyroxine 25 MICROGram(s) Oral daily  lidocaine   4% Patch 1 Patch Transdermal daily  melatonin 3 milliGRAM(s) Oral at bedtime  metoprolol tartrate 25 milliGRAM(s) Oral every 12 hours  nicotine -   7 mG/24Hr(s) Patch 1 Patch Transdermal daily  pantoprazole    Tablet 40 milliGRAM(s) Oral before breakfast  polyethylene glycol 3350 17 Gram(s) Oral daily  potassium chloride    Tablet ER 20 milliEquivalent(s) Oral daily  senna 2 Tablet(s) Oral at bedtime  sodium chloride 1 Gram(s) Oral every 12 hours  sucralfate 1 Gram(s) Oral two times a day  tamsulosin 0.4 milliGRAM(s) Oral at bedtime  thiamine 100 milliGRAM(s) Oral daily      Allergies    No Known Allergies  Allergy Status Unknown    Intolerances        SOCIAL HISTORY:  Denies ETOh,Smoking,     FAMILY HISTORY:  FH: hypertension        REVIEW OF SYSTEMS:    CONSTITUTIONAL: No weakness, fevers or chills  RESPIRATORY: No cough, wheezing, hemoptysis; No shortness of breath  CARDIOVASCULAR: No chest pain or palpitations  GASTROINTESTINAL: No abdominal or epigastric pain. No nausea, vomiting,     No diarrhea or constipation. No melena   GENITOURINARY: No dysuria, frequency or hematuria  SKIN: dry                                     10.5   7.34  )-----------( 298      ( 22 May 2025 08:26 )             31.2       CBC Full  -  ( 22 May 2025 08:26 )  WBC Count : 7.34 K/uL  RBC Count : 3.44 M/uL  Hemoglobin : 10.5 g/dL  Hematocrit : 31.2 %  Platelet Count - Automated : 298 K/uL  Mean Cell Volume : 90.7 fl  Mean Cell Hemoglobin : 30.5 pg  Mean Cell Hemoglobin Concentration : 33.7 g/dL  Auto Neutrophil # : x  Auto Lymphocyte # : x  Auto Monocyte # : x  Auto Eosinophil # : x  Auto Basophil # : x  Auto Neutrophil % : x  Auto Lymphocyte % : x  Auto Monocyte % : x  Auto Eosinophil % : x  Auto Basophil % : x      05-22    133[L]  |  96  |  28[H]  ----------------------------<  116[H]  3.7   |  30  |  1.60[H]    Ca    8.9      22 May 2025 08:26  Phos  2.7     05-21  Mg     1.6     05-21    TPro  7.4  /  Alb  3.4  /  TBili  0.9  /  DBili  x   /  AST  11[L]  /  ALT  10[L]  /  AlkPhos  63  05-21      CAPILLARY BLOOD GLUCOSE          Vital Signs Last 24 Hrs  T(C): 36.5 (22 May 2025 11:40), Max: 36.7 (21 May 2025 20:12)  T(F): 97.7 (22 May 2025 11:40), Max: 98.1 (21 May 2025 20:12)  HR: 61 (22 May 2025 17:10) (49 - 61)  BP: 136/79 (22 May 2025 17:10) (108/67 - 136/79)  BP(mean): --  RR: 18 (22 May 2025 11:40) (18 - 18)  SpO2: 93% (22 May 2025 11:40) (93% - 96%)    Parameters below as of 22 May 2025 11:40  Patient On (Oxygen Delivery Method): nasal cannula  O2 Flow (L/min): 2      Urinalysis Basic - ( 22 May 2025 08:26 )    Color: x / Appearance: x / SG: x / pH: x  Gluc: 116 mg/dL / Ketone: x  / Bili: x / Urobili: x   Blood: x / Protein: x / Nitrite: x   Leuk Esterase: x / RBC: x / WBC x   Sq Epi: x / Non Sq Epi: x / Bacteria: x        PT/INR - ( 21 May 2025 05:52 )   PT: 14.0 sec;   INR: 1.20 ratio                                PHYSICAL EXAM:    Constitutional: NAD  HEENT: conjunctive   clear   Neck:  No JVD  Respiratory: decrease bs b/l   Cardiovascular: S1 and S2  Gastrointestinal: BS+, soft, NT/ND ,   Extremities: No peripheral edema  : No Crisostomo  Skin: No rashes  Access: Not applicable            MEDICATIONS  (STANDING):  apixaban 2.5 milliGRAM(s) Oral every 12 hours  atorvastatin 20 milliGRAM(s) Oral at bedtime  clopidogrel Tablet 75 milliGRAM(s) Oral daily  DULoxetine 30 milliGRAM(s) Oral daily  finasteride 5 milliGRAM(s) Oral daily  folic acid 1 milliGRAM(s) Oral daily  furosemide   Injectable 20 milliGRAM(s) IV Push every 8 hours  gabapentin 100 milliGRAM(s) Oral at bedtime  lactobacillus acidophilus 1 Tablet(s) Oral every 12 hours  levothyroxine 25 MICROGram(s) Oral daily  lidocaine   4% Patch 1 Patch Transdermal daily  melatonin 3 milliGRAM(s) Oral at bedtime  metoprolol tartrate 25 milliGRAM(s) Oral every 12 hours  nicotine -   7 mG/24Hr(s) Patch 1 Patch Transdermal daily  pantoprazole    Tablet 40 milliGRAM(s) Oral before breakfast  polyethylene glycol 3350 17 Gram(s) Oral daily  potassium chloride    Tablet ER 20 milliEquivalent(s) Oral daily  senna 2 Tablet(s) Oral at bedtime  sodium chloride 1 Gram(s) Oral every 12 hours  sucralfate 1 Gram(s) Oral two times a day  tamsulosin 0.4 milliGRAM(s) Oral at bedtime  thiamine 100 milliGRAM(s) Oral daily                                                                      12.6   9.25  )-----------( 353      ( 20 May 2025 15:35 )             37.1       CBC Full  -  ( 20 May 2025 15:35 )  WBC Count : 9.25 K/uL  RBC Count : 4.18 M/uL  Hemoglobin : 12.6 g/dL  Hematocrit : 37.1 %  Platelet Count - Automated : 353 K/uL  Mean Cell Volume : 88.8 fl  Mean Cell Hemoglobin : 30.1 pg  Mean Cell Hemoglobin Concentration : 34.0 g/dL  Auto Neutrophil # : x  Auto Lymphocyte # : x  Auto Monocyte # : x  Auto Eosinophil # : x  Auto Basophil # : x  Auto Neutrophil % : x  Auto Lymphocyte % : x  Auto Monocyte % : x  Auto Eosinophil % : x  Auto Basophil % : x      05-20    128[L]  |  91[L]  |  16  ----------------------------<  122[H]  4.0   |  27  |  1.30    Ca    9.6      20 May 2025 15:35  Mg     1.9     05-20    TPro  8.3  /  Alb  4.0  /  TBili  1.0  /  DBili  x   /  AST  18  /  ALT  13  /  AlkPhos  72  05-20      CAPILLARY BLOOD GLUCOSE          Vital Signs Last 24 Hrs  T(C): 36.2 (20 May 2025 14:20), Max: 36.2 (20 May 2025 14:20)  T(F): 97.2 (20 May 2025 14:20), Max: 97.2 (20 May 2025 14:20)  HR: 107 (20 May 2025 14:20) (107 - 107)  BP: 180/117 (20 May 2025 14:20) (180/117 - 180/117)  BP(mean): --  RR: 22 (20 May 2025 14:20) (22 - 22)  SpO2: 96% (20 May 2025 14:20) (96% - 96%)        Urinalysis Basic - ( 20 May 2025 15:35 )    Color: x / Appearance: x / SG: x / pH: x  Gluc: 122 mg/dL / Ketone: x  / Bili: x / Urobili: x   Blood: x / Protein: x / Nitrite: x   Leuk Esterase: x / RBC: x / WBC x   Sq Epi: x / Non Sq Epi: x / Bacteria: x        PT/INR - ( 20 May 2025 15:35 )   PT: 14.3 sec;   INR: 1.21 ratio         PTT - ( 20 May 2025 15:35 )  PTT:37.7 sec

## 2025-05-22 NOTE — PHYSICAL THERAPY INITIAL EVALUATION ADULT - ADDITIONAL COMMENTS
Patient lives in LTC facility.  Patient was independent in ADLs and ambulated independently without device, has wheelchair for long distances as needed.

## 2025-05-22 NOTE — CASE MANAGEMENT PROGRESS NOTE - NSCMPROGRESSNOTE_GEN_ALL_CORE
Patient discussed during interdisciplinary rounds. STAR CHF and PNA status noted. Patient remains acute on IV lasix and 3L oxygen. SW following for return to [place] ([facility]).

## 2025-05-22 NOTE — DIETITIAN INITIAL EVALUATION ADULT - NS FNS DIET ORDER
Diet, DASH/TLC:   Sodium & Cholesterol Restricted  Easy to Chew (EASYTOCHEW) (05-20-25 @ 20:35) [Active]

## 2025-05-22 NOTE — DIETITIAN INITIAL EVALUATION ADULT - PROBLEM SELECTOR PROBLEM 3
Fluid overload
32 y/o F with PMHx of fibromyalgia presents to the ED c/o epigastric pain, shoulder pain and neck pain for 3 weeks. Pt states she got the Pfizer vaccine 3.5 weeks ago and started to have shoulder and neck pain but now resolved. But 3 weeks of abdominal pain and had a cholecystectomy. Three days ago received the 2nd COVID vaccine dose and now having b/l shoulder pain and neck pain. Also with nausea, vomiting and acid reflux symptoms after eating. Two days ago started to have nonbloody watery diarrhea. Had 3 episodes between yesterday and today.

## 2025-05-22 NOTE — PHYSICAL THERAPY INITIAL EVALUATION ADULT - GENERAL OBSERVATIONS, REHAB EVAL
Patient received seated in bed, cooperative with physical therapy, +O2 via nasal canula, heart monitor

## 2025-05-22 NOTE — PROGRESS NOTE ADULT - SUBJECTIVE AND OBJECTIVE BOX
PROGRESS NOTE  Patient is a 79y old  Male who presents with a chief complaint of sob and leg selling (22 May 2025 10:27)    Chart and available morning labs /imaging are reviewed electronically , urgent issues addressed . More information  is being added upon completion of rounds , when more information is collected and management discussed with consultants , medical staff and social service/case management on the floor   OVERNIGHT  No new issues reported by medical staff . All above noted Patient is resting in a bed comfortably .Confused ,poor mentation .No distress noted     HPI:  79M with PMh of CAD s/p stents, HTN, HLD, tobacco use h/o etoh use disorder who presents with shortness of breath and elevated BP. patient reports that his main concern for his BP being elevated and noticed he was wheezing. he admits to smoking but states he usually does not wheeze. denies fevers or chills, no chest pain, or vomiting. also mentions some increased leg swelling recently Admit to monitored unit for cardiac monitoring, obtain echo to evaluate LVEF, intravenous diuresis as per card consult , monitor ins/outs, monitor renal profile and electrolytes closely ,send 3 sets of enzymes, O2 supply, serial chest xrays, monitor weights and oral intake of fluids, nutritionist consult Found to have pneumonia Admitted for septic workup and evaluation ,send blood and urine cx,serial lactate levels ,monitor vitals ,ivfs hydration ,monitor urine output and renal profile ,iv abx initiated  (20 May 2025 20:15)    PAST MEDICAL & SURGICAL HISTORY:  Benign Essential Hypertension      Hyperlipidemia      Acute Myocardial Infarction      Coronary Stent      Personal History of Tobacco Use      ETOH Abuse      CAD (Coronary Atherosclerotic Disease)      Chronic atrial fibrillation      Pulmonary embolism      Anemia due to acute blood loss      Heart failure      Hypothyroid      Alcohol use disorder, mild, abuse      Hypothyroidism      Benign essential HTN      BPH (benign prostatic hyperplasia)      Smokes tobacco daily      Alcohol use disorder, mild, abuse      No significant past surgical history      S/P CABG x 1      S/P CABG (coronary artery bypass graft)      Coronary stent patent          MEDICATIONS  (STANDING):  apixaban 2.5 milliGRAM(s) Oral every 12 hours  atorvastatin 20 milliGRAM(s) Oral at bedtime  clopidogrel Tablet 75 milliGRAM(s) Oral daily  DULoxetine 30 milliGRAM(s) Oral daily  finasteride 5 milliGRAM(s) Oral daily  folic acid 1 milliGRAM(s) Oral daily  furosemide   Injectable 20 milliGRAM(s) IV Push every 8 hours  gabapentin 100 milliGRAM(s) Oral at bedtime  lactobacillus acidophilus 1 Tablet(s) Oral every 12 hours  levothyroxine 25 MICROGram(s) Oral daily  lidocaine   4% Patch 1 Patch Transdermal daily  melatonin 3 milliGRAM(s) Oral at bedtime  metoprolol tartrate 25 milliGRAM(s) Oral every 12 hours  nicotine -   7 mG/24Hr(s) Patch 1 Patch Transdermal daily  pantoprazole    Tablet 40 milliGRAM(s) Oral before breakfast  polyethylene glycol 3350 17 Gram(s) Oral daily  potassium chloride    Tablet ER 20 milliEquivalent(s) Oral daily  senna 2 Tablet(s) Oral at bedtime  sodium chloride 1 Gram(s) Oral every 12 hours  sucralfate 1 Gram(s) Oral two times a day  tamsulosin 0.4 milliGRAM(s) Oral at bedtime  thiamine 100 milliGRAM(s) Oral daily    MEDICATIONS  (PRN):  acetaminophen     Tablet .. 650 milliGRAM(s) Oral every 6 hours PRN Temp greater or equal to 38C (100.4F), Mild Pain (1 - 3)  aluminum hydroxide/magnesium hydroxide/simethicone Suspension 30 milliLiter(s) Oral every 4 hours PRN Dyspepsia  guaiFENesin Oral Liquid (Sugar-Free) 200 milliGRAM(s) Oral every 6 hours PRN Cough  hydrALAZINE Injectable 10 milliGRAM(s) IV Push every 4 hours PRN for systolic BP>170  methocarbamol 500 milliGRAM(s) Oral every 8 hours PRN Muscle Spasm  ondansetron Injectable 4 milliGRAM(s) IV Push every 8 hours PRN Nausea and/or Vomiting  oxyCODONE    IR 5 milliGRAM(s) Oral every 4 hours PRN Severe Pain (7 - 10)      OBJECTIVE    T(C): 36.5 (05-22-25 @ 11:40), Max: 37.1 (05-21-25 @ 15:50)  HR: 57 (05-22-25 @ 13:15) (49 - 67)  BP: 125/79 (05-22-25 @ 13:15) (108/67 - 154/89)  RR: 18 (05-22-25 @ 11:40) (18 - 20)  SpO2: 93% (05-22-25 @ 11:40) (93% - 96%)  Wt(kg): --  I&O's Summary    21 May 2025 07:01  -  22 May 2025 07:00  --------------------------------------------------------  IN: 0 mL / OUT: 850 mL / NET: -850 mL    22 May 2025 07:01  -  22 May 2025 15:13  --------------------------------------------------------  IN: 450 mL / OUT: 300 mL / NET: 150 mL          REVIEW OF SYSTEMS:  CONSTITUTIONAL: No fever, weight loss, or fatigue  EYES: No eye pain, visual disturbances, or discharge  ENMT:   No sinus or throat pain  NECK: No pain or stiffness  RESPIRATORY: No cough, wheezing, chills or hemoptysis; No shortness of breath  CARDIOVASCULAR: No chest pain, palpitations, dizziness, or leg swelling  GASTROINTESTINAL: No abdominal pain. No nausea, vomiting; No diarrhea or constipation. No melena or hematochezia.  GENITOURINARY: No dysuria, frequency, hematuria, or incontinence  NEUROLOGICAL: No headaches, memory loss, loss of strength, numbness, or tremors  SKIN: No itching, burning, rashes, or lesions   MUSCULOSKELETAL: No joint pain or swelling; No muscle, back, or extremity pain    PHYSICAL EXAM:  Appearance: NAD. VS past 24 hrs -as above   HEENT:   Moist oral mucosa. Conjunctiva clear b/l.   Neck : supple  Respiratory: Lungs CTAB.  Gastrointestinal:  Soft, nontender. No rebound. No rigidity. BS present	  Cardiovascular: RRR ,S1S2 present  Neurologic: Non-focal. Moving all extremities.  Extremities: No edema. No erythema. No calf tenderness.  Skin: No rashes, No ecchymoses, No cyanosis.	  wounds ,skin lesions-See skin assesment flow sheet   LABS:                        10.5   7.34  )-----------( 298      ( 22 May 2025 08:26 )             31.2     05-22    133[L]  |  96  |  28[H]  ----------------------------<  116[H]  3.7   |  30  |  1.60[H]    Ca    8.9      22 May 2025 08:26  Phos  2.7     05-21  Mg     1.6     05-21    TPro  7.4  /  Alb  3.4  /  TBili  0.9  /  DBili  x   /  AST  11[L]  /  ALT  10[L]  /  AlkPhos  63  05-21    CAPILLARY BLOOD GLUCOSE        PT/INR - ( 21 May 2025 05:52 )   PT: 14.0 sec;   INR: 1.20 ratio         PTT - ( 20 May 2025 15:35 )  PTT:37.7 sec  Urinalysis Basic - ( 22 May 2025 08:26 )    Color: x / Appearance: x / SG: x / pH: x  Gluc: 116 mg/dL / Ketone: x  / Bili: x / Urobili: x   Blood: x / Protein: x / Nitrite: x   Leuk Esterase: x / RBC: x / WBC x   Sq Epi: x / Non Sq Epi: x / Bacteria: x        Urinalysis with Rflx Culture (collected 21 May 2025 04:53)    Culture - Blood (collected 20 May 2025 15:35)  Source: Blood Blood-Peripheral  Preliminary Report (22 May 2025 02:02):    No growth at 24 hours    Culture - Blood (collected 20 May 2025 15:25)  Source: Blood Blood-Peripheral  Preliminary Report (22 May 2025 02:02):    No growth at 24 hours      RADIOLOGY & ADDITIONAL TESTS:   reviewed elctronically  ASSESSMENT/PLAN: 	    45 minutes aggregate time was spent on this visit, 50% visit time spent in care co-ordination with other attendings and counselling patient .I have discussed care plan with patient / HCP/family member ,who expressed understanding of problems treatment and their effect and side effects, alternatives in details. I have asked if they have any questions and concerns and appropriately addressed them to best of my ability.

## 2025-05-22 NOTE — SWALLOW BEDSIDE ASSESSMENT ADULT - H & P REVIEW
Per charting, "79M with PMh of CAD s/p stents, HTN, HLD, tobacco use h/o etoh use disorder who presents with shortness of breath and elevated BP. patient reports that his main concern for his BP being elevated and noticed he was wheezing. he admits to smoking but states he usually does not wheeze. denies fevers or chills, no chest pain, or vomiting. also mentions some increased leg swelling recently Admit to monitored unit for cardiac monitoring, obtain echo to evaluate LVEF, intravenous diuresis as per card consult , monitor ins/outs, monitor renal profile and electrolytes closely ,send 3 sets of enzymes, O2 supply, serial chest xrays, monitor weights and oral intake of fluids, nutritionist consult Found to have pneumonia Admitted for septic workup and evaluation ,send blood and urine cx,serial lactate levels ,monitor vitals ,ivfs hydration ,monitor urine output and renal profile ,iv abx initiated."/yes

## 2025-05-22 NOTE — DIETITIAN INITIAL EVALUATION ADULT - PERTINENT MEDS FT
MEDICATIONS  (STANDING):  apixaban 2.5 milliGRAM(s) Oral every 12 hours  atorvastatin 20 milliGRAM(s) Oral at bedtime  clopidogrel Tablet 75 milliGRAM(s) Oral daily  DULoxetine 30 milliGRAM(s) Oral daily  finasteride 5 milliGRAM(s) Oral daily  folic acid 1 milliGRAM(s) Oral daily  furosemide   Injectable 20 milliGRAM(s) IV Push every 8 hours  gabapentin 100 milliGRAM(s) Oral at bedtime  lactobacillus acidophilus 1 Tablet(s) Oral every 12 hours  levothyroxine 25 MICROGram(s) Oral daily  lidocaine   4% Patch 1 Patch Transdermal daily  melatonin 3 milliGRAM(s) Oral at bedtime  metoprolol tartrate 25 milliGRAM(s) Oral every 12 hours  nicotine -   7 mG/24Hr(s) Patch 1 Patch Transdermal daily  pantoprazole    Tablet 40 milliGRAM(s) Oral before breakfast  polyethylene glycol 3350 17 Gram(s) Oral daily  potassium chloride    Tablet ER 20 milliEquivalent(s) Oral daily  senna 2 Tablet(s) Oral at bedtime  sodium chloride 1 Gram(s) Oral every 12 hours  sucralfate 1 Gram(s) Oral two times a day  tamsulosin 0.4 milliGRAM(s) Oral at bedtime  thiamine 100 milliGRAM(s) Oral daily    MEDICATIONS  (PRN):  acetaminophen     Tablet .. 650 milliGRAM(s) Oral every 6 hours PRN Temp greater or equal to 38C (100.4F), Mild Pain (1 - 3)  aluminum hydroxide/magnesium hydroxide/simethicone Suspension 30 milliLiter(s) Oral every 4 hours PRN Dyspepsia  guaiFENesin Oral Liquid (Sugar-Free) 200 milliGRAM(s) Oral every 6 hours PRN Cough  hydrALAZINE Injectable 10 milliGRAM(s) IV Push every 4 hours PRN for systolic BP>170  methocarbamol 500 milliGRAM(s) Oral every 8 hours PRN Muscle Spasm  ondansetron Injectable 4 milliGRAM(s) IV Push every 8 hours PRN Nausea and/or Vomiting  oxyCODONE    IR 5 milliGRAM(s) Oral every 4 hours PRN Severe Pain (7 - 10)

## 2025-05-22 NOTE — GOALS OF CARE CONVERSATION - ADVANCED CARE PLANNING - CONVERSATION DETAILS
[facility]-Palliative care SW met with patient at bedside. Reviewed patient's medical and social history as well as events leading to patient's hospitalization. Writer discussed patient's current diagnosis (hypervolemia, SOB, CAD s/p stents, HTN, HLD, tobacco use h/o etoh use disorder), medical condition and management. Patient reports he does not have a HCP and did not wish to complete one however identified his daughter Nicole and Jailene as the people who would help make medical decisions for him. Patient with a prior MOLST on chart with DNR/Trial of intubation orders. Discussed prior MOLST with patient and he states his wishes remain the same and also wanted to include No feeding tube, no dialysis, and determine use of IVF and antibiotics. MOLST form updated, and renewed. All questions answered.

## 2025-05-22 NOTE — DIETITIAN INITIAL EVALUATION ADULT - ADD RECOMMEND
Encourage compliance to rx therapeutic diet with emphasis on lean protein sources at all meals. Recommend MVI with minerals daily.

## 2025-05-22 NOTE — PROGRESS NOTE ADULT - SUBJECTIVE AND OBJECTIVE BOX
CHIEF COMPLAINT/ REASON FOR VISIT  .. Patient was seen to address the  issue listed under PROBLEM LIST which is located toward bottom of this note     BERNARDO KEITH    [facility] [address]    Allergies    No Known Allergies  Allergy Status Unknown    Intolerances        PAST MEDICAL & SURGICAL HISTORY:  Benign Essential Hypertension      Hyperlipidemia      Acute Myocardial Infarction      Coronary Stent      Personal History of Tobacco Use      ETOH Abuse      CAD (Coronary Atherosclerotic Disease)      Chronic atrial fibrillation      Pulmonary embolism      Anemia due to acute blood loss      Heart failure      Hypothyroid      Alcohol use disorder, mild, abuse      Hypothyroidism      Benign essential HTN      BPH (benign prostatic hyperplasia)      Smokes tobacco daily      Alcohol use disorder, mild, abuse      No significant past surgical history      S/P CABG x 1      S/P CABG (coronary artery bypass graft)      Coronary stent patent          FAMILY HISTORY:  FH: hypertension        Home Medications:  apixaban 2.5 mg oral tablet: 1 tab(s) orally 2 times a day (21 May 2025 08:24)  atorvastatin 20 mg oral tablet: 1 tab(s) orally once a day (at bedtime) (21 May 2025 08:24)  diclofenac topical: Apply 2 grams topically to both knees 2 times a day (21 May 2025 08:30)  DULoxetine 30 mg oral delayed release capsule: 1 cap(s) orally once a day (21 May 2025 08:24)  finasteride 5 mg oral tablet: 1 tab(s) orally once a day (21 May 2025 08:24)  gabapentin 100 mg oral capsule: 1 cap(s) orally once a day (at bedtime) (21 May 2025 08:24)  levothyroxine 25 mcg (0.025 mg) oral tablet: 1 tab(s) orally once a day (21 May 2025 08:24)  melatonin 3 mg oral tablet: 1 tab(s) orally once a day (at bedtime) (21 May 2025 08:46)  Metoprolol Tartrate 25 mg oral tablet: 1 tab(s) orally 2 times a day (21 May 2025 08:24)  Plavix 75 mg oral tablet: 1 tab(s) orally once a day (21 May 2025 08:46)  Senna 8.6 mg oral tablet: 2 tab(s) orally once a day (at bedtime) (21 May 2025 08:46)  Sodium Chloride 1000 mg oral tablet, soluble: 1 tab(s) orally every 8 hours for 10 days (21 May 2025 08:46)  sucralfate 1 g oral tablet: 1 tab(s) orally 2 times a day Give 1 hour before meals. (21 May 2025 09:47)  tamsulosin 0.4 mg oral capsule: 1 cap(s) orally once a day (at bedtime) (21 May 2025 08:24)  Tylenol 325 mg oral tablet: 2 tab(s) orally every 12 hours as needed for  mild pain (21 May 2025 08:31)      MEDICATIONS  (STANDING):  apixaban 2.5 milliGRAM(s) Oral every 12 hours  atorvastatin 20 milliGRAM(s) Oral at bedtime  clopidogrel Tablet 75 milliGRAM(s) Oral daily  DULoxetine 30 milliGRAM(s) Oral daily  finasteride 5 milliGRAM(s) Oral daily  folic acid 1 milliGRAM(s) Oral daily  furosemide   Injectable 20 milliGRAM(s) IV Push every 8 hours  gabapentin 100 milliGRAM(s) Oral at bedtime  lactobacillus acidophilus 1 Tablet(s) Oral every 12 hours  levothyroxine 25 MICROGram(s) Oral daily  lidocaine   4% Patch 1 Patch Transdermal daily  melatonin 3 milliGRAM(s) Oral at bedtime  metoprolol tartrate 25 milliGRAM(s) Oral every 12 hours  nicotine -   7 mG/24Hr(s) Patch 1 Patch Transdermal daily  pantoprazole    Tablet 40 milliGRAM(s) Oral before breakfast  polyethylene glycol 3350 17 Gram(s) Oral daily  potassium chloride    Tablet ER 20 milliEquivalent(s) Oral daily  senna 2 Tablet(s) Oral at bedtime  sodium chloride 1 Gram(s) Oral every 12 hours  sucralfate 1 Gram(s) Oral two times a day  tamsulosin 0.4 milliGRAM(s) Oral at bedtime  thiamine 100 milliGRAM(s) Oral daily    MEDICATIONS  (PRN):  acetaminophen     Tablet .. 650 milliGRAM(s) Oral every 6 hours PRN Temp greater or equal to 38C (100.4F), Mild Pain (1 - 3)  aluminum hydroxide/magnesium hydroxide/simethicone Suspension 30 milliLiter(s) Oral every 4 hours PRN Dyspepsia  guaiFENesin Oral Liquid (Sugar-Free) 200 milliGRAM(s) Oral every 6 hours PRN Cough  hydrALAZINE Injectable 10 milliGRAM(s) IV Push every 4 hours PRN for systolic BP>170  methocarbamol 500 milliGRAM(s) Oral every 8 hours PRN Muscle Spasm  ondansetron Injectable 4 milliGRAM(s) IV Push every 8 hours PRN Nausea and/or Vomiting  oxyCODONE    IR 5 milliGRAM(s) Oral every 4 hours PRN Severe Pain (7 - 10)      Diet, DASH/TLC:   Sodium & Cholesterol Restricted  Easy to Chew (EASYTOCHEW) (05-20-25 @ 20:35) [Active]          Vital Signs Last 24 Hrs  T(C): 36.6 (22 May 2025 04:50), Max: 37.1 (21 May 2025 15:50)  T(F): 97.8 (22 May 2025 04:50), Max: 98.7 (21 May 2025 15:50)  HR: 55 (22 May 2025 04:50) (55 - 68)  BP: 119/66 (22 May 2025 04:50) (108/67 - 154/89)  BP(mean): --  RR: 18 (22 May 2025 04:50) (17 - 20)  SpO2: 96% (22 May 2025 04:50) (95% - 99%)    Parameters below as of 22 May 2025 04:50  Patient On (Oxygen Delivery Method): nasal cannula  O2 Flow (L/min): 2        05-21-25 @ 07:01  -  05-22-25 @ 07:00  --------------------------------------------------------  IN: 0 mL / OUT: 850 mL / NET: -850 mL              LABS:                        10.5   7.34  )-----------( 298      ( 22 May 2025 08:26 )             31.2     05-21    128[L]  |  95[L]  |  18  ----------------------------<  161[H]  3.7   |  24  |  1.30    Ca    8.9      21 May 2025 05:52  Phos  2.7     05-21  Mg     1.6     05-21    TPro  7.4  /  Alb  3.4  /  TBili  0.9  /  DBili  x   /  AST  11[L]  /  ALT  10[L]  /  AlkPhos  63  05-21    PT/INR - ( 21 May 2025 05:52 )   PT: 14.0 sec;   INR: 1.20 ratio         PTT - ( 20 May 2025 15:35 )  PTT:37.7 sec  Urinalysis Basic - ( 21 May 2025 05:52 )    Color: x / Appearance: x / SG: x / pH: x  Gluc: 161 mg/dL / Ketone: x  / Bili: x / Urobili: x   Blood: x / Protein: x / Nitrite: x   Leuk Esterase: x / RBC: x / WBC x   Sq Epi: x / Non Sq Epi: x / Bacteria: x            WBC:  WBC Count: 7.34 K/uL (05-22 @ 08:26)  WBC Count: 3.75 K/uL (05-21 @ 05:52)  WBC Count: 9.25 K/uL (05-20 @ 15:35)      MICROBIOLOGY:  RECENT CULTURES:  05-20 Blood Blood-Peripheral XXXX XXXX   No growth at 24 hours    05-20 Blood Blood-Peripheral XXXX XXXX   No growth at 24 hours                PT/INR - ( 21 May 2025 05:52 )   PT: 14.0 sec;   INR: 1.20 ratio         PTT - ( 20 May 2025 15:35 )  PTT:37.7 sec    Sodium:  Sodium: 128 mmol/L (05-21 @ 05:52)  Sodium: 128 mmol/L (05-20 @ 15:35)      1.30 mg/dL 05-21 @ 05:52  1.30 mg/dL 05-20 @ 15:35      Hemoglobin:  Hemoglobin: 10.5 g/dL (05-22 @ 08:26)  Hemoglobin: 11.8 g/dL (05-21 @ 05:52)  Hemoglobin: 12.6 g/dL (05-20 @ 15:35)      Platelets: Platelet Count - Automated: 298 K/uL (05-22 @ 08:26)  Platelet Count - Automated: 302 K/uL (05-21 @ 05:52)  Platelet Count - Automated: 353 K/uL (05-20 @ 15:35)      LIVER FUNCTIONS - ( 21 May 2025 05:52 )  Alb: 3.4 g/dL / Pro: 7.4 g/dL / ALK PHOS: 63 U/L / ALT: 10 U/L / AST: 11 U/L / GGT: x             Urinalysis Basic - ( 21 May 2025 05:52 )    Color: x / Appearance: x / SG: x / pH: x  Gluc: 161 mg/dL / Ketone: x  / Bili: x / Urobili: x   Blood: x / Protein: x / Nitrite: x   Leuk Esterase: x / RBC: x / WBC x   Sq Epi: x / Non Sq Epi: x / Bacteria: x        RADIOLOGY & ADDITIONAL STUDIES:      MICROBIOLOGY:  RECENT CULTURES:  05-20 Blood Blood-Peripheral XXXX XXXX   No growth at 24 hours    05-20 Blood Blood-Peripheral XXXX XXXX   No growth at 24 hours

## 2025-05-22 NOTE — SWALLOW BEDSIDE ASSESSMENT ADULT - SWALLOW EVAL: DIAGNOSIS
Pt presenting with a grossly functional oropharyngeal swallow within bedside parameters. Pt self-fed trials of regular solids and thin liquids. Adequate oral containment, manipulation, and clearance. Pharyngeal motor response observed with digital palpation of laryngeal movement. Single swallows per bolus. No overt s/s penetration/aspiration. Recommend advancement to regular and thin liquids. This service to s/o. Re-consult as appropriate.

## 2025-05-22 NOTE — SWALLOW BEDSIDE ASSESSMENT ADULT - COMMENTS
Imaging:  CT Chest 5/20/25: "1. Diffuse interlobular septal thickening and small bilateral pleural   effusions, favored be due to underlying edema or less likely an   infectious or inflammatory process.  2. 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  3. 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 Correlation for   reducibility is recommended."    Clinical swallow evaluation order received and appreciated. Chart reviewed, events noted. Pt received upright in bed, awake and alert. Oriented x 3. Adequate command following. +NC. Baseline wheeze. Clinical swallow evaluation completed. See report for details. Pt left as received in NAD. LUCÍA Soria attempted to contact (ext. [phone]) re: results/recommendations, will continue to attempt. Dr. Marcos and pt notified re: results/recommendations. This service to s/o. Please re-consult if change in status or concerns for aspiration and/or related complications.

## 2025-05-22 NOTE — DIETITIAN INITIAL EVALUATION ADULT - PERTINENT LABORATORY DATA
05-22    133[L]  |  96  |  28[H]  ----------------------------<  116[H]  3.7   |  30  |  1.60[H]    Ca    8.9      22 May 2025 08:26  Phos  2.7     05-21  Mg     1.6     05-21    TPro  7.4  /  Alb  3.4  /  TBili  0.9  /  DBili  x   /  AST  11[L]  /  ALT  10[L]  /  AlkPhos  63  05-21

## 2025-05-23 ENCOUNTER — TRANSCRIPTION ENCOUNTER (OUTPATIENT)
Age: 80
End: 2025-05-23

## 2025-05-23 LAB
ALBUMIN SERPL ELPH-MCNC: 3.5 G/DL — SIGNIFICANT CHANGE UP (ref 3.3–5)
ALP SERPL-CCNC: 54 U/L — SIGNIFICANT CHANGE UP (ref 40–120)
ALT FLD-CCNC: 12 U/L — SIGNIFICANT CHANGE UP (ref 12–78)
ANION GAP SERPL CALC-SCNC: 5 MMOL/L — SIGNIFICANT CHANGE UP (ref 5–17)
AST SERPL-CCNC: 10 U/L — LOW (ref 15–37)
BILIRUB SERPL-MCNC: 0.6 MG/DL — SIGNIFICANT CHANGE UP (ref 0.2–1.2)
BUN SERPL-MCNC: 29 MG/DL — HIGH (ref 7–23)
CALCIUM SERPL-MCNC: 9.4 MG/DL — SIGNIFICANT CHANGE UP (ref 8.5–10.1)
CHLORIDE SERPL-SCNC: 94 MMOL/L — LOW (ref 96–108)
CO2 SERPL-SCNC: 33 MMOL/L — HIGH (ref 22–31)
CREAT SERPL-MCNC: 1.5 MG/DL — HIGH (ref 0.5–1.3)
EGFR: 48 ML/MIN/1.73M2 — LOW
EGFR: 48 ML/MIN/1.73M2 — LOW
GLUCOSE SERPL-MCNC: 102 MG/DL — HIGH (ref 70–99)
HCT VFR BLD CALC: 33.6 % — LOW (ref 39–50)
HGB BLD-MCNC: 11.1 G/DL — LOW (ref 13–17)
INR BLD: 1.18 RATIO — HIGH (ref 0.85–1.16)
MCHC RBC-ENTMCNC: 30.2 PG — SIGNIFICANT CHANGE UP (ref 27–34)
MCHC RBC-ENTMCNC: 33 G/DL — SIGNIFICANT CHANGE UP (ref 32–36)
MCV RBC AUTO: 91.3 FL — SIGNIFICANT CHANGE UP (ref 80–100)
NRBC BLD AUTO-RTO: 0 /100 WBCS — SIGNIFICANT CHANGE UP (ref 0–0)
PHOSPHATE SERPL-MCNC: 3.8 MG/DL — SIGNIFICANT CHANGE UP (ref 2.5–4.5)
PLATELET # BLD AUTO: 312 K/UL — SIGNIFICANT CHANGE UP (ref 150–400)
POTASSIUM SERPL-MCNC: 3.8 MMOL/L — SIGNIFICANT CHANGE UP (ref 3.5–5.3)
POTASSIUM SERPL-SCNC: 3.8 MMOL/L — SIGNIFICANT CHANGE UP (ref 3.5–5.3)
PROT SERPL-MCNC: 7 G/DL — SIGNIFICANT CHANGE UP (ref 6–8.3)
PROTHROM AB SERPL-ACNC: 13.8 SEC — HIGH (ref 9.9–13.4)
RBC # BLD: 3.68 M/UL — LOW (ref 4.2–5.8)
RBC # FLD: 15.1 % — HIGH (ref 10.3–14.5)
SODIUM SERPL-SCNC: 132 MMOL/L — LOW (ref 135–145)
WBC # BLD: 7.92 K/UL — SIGNIFICANT CHANGE UP (ref 3.8–10.5)
WBC # FLD AUTO: 7.92 K/UL — SIGNIFICANT CHANGE UP (ref 3.8–10.5)

## 2025-05-23 PROCEDURE — 71045 X-RAY EXAM CHEST 1 VIEW: CPT | Mod: 26

## 2025-05-23 RX ORDER — DEXTROMETHORPHAN HBR, GUAIFENESIN 200 MG/10ML
10 LIQUID ORAL
Qty: 0 | Refills: 0 | DISCHARGE
Start: 2025-05-23

## 2025-05-23 RX ORDER — FUROSEMIDE 10 MG/ML
1 INJECTION INTRAMUSCULAR; INTRAVENOUS
Qty: 0 | Refills: 0 | DISCHARGE
Start: 2025-05-23

## 2025-05-23 RX ORDER — LEVOTHYROXINE SODIUM 300 MCG
1 TABLET ORAL
Qty: 0 | Refills: 0 | DISCHARGE
Start: 2025-05-23

## 2025-05-23 RX ORDER — NICOTINE POLACRILEX 4 MG/1
1 GUM, CHEWING ORAL
Qty: 0 | Refills: 0 | DISCHARGE
Start: 2025-05-23

## 2025-05-23 RX ORDER — APIXABAN 2.5 MG/1
5 TABLET, FILM COATED ORAL EVERY 12 HOURS
Refills: 0 | Status: DISCONTINUED | OUTPATIENT
Start: 2025-05-23 | End: 2025-05-24

## 2025-05-23 RX ORDER — CLOPIDOGREL BISULFATE 75 MG/1
1 TABLET, FILM COATED ORAL
Refills: 0 | DISCHARGE

## 2025-05-23 RX ORDER — APIXABAN 2.5 MG/1
1 TABLET, FILM COATED ORAL
Qty: 0 | Refills: 0 | DISCHARGE
Start: 2025-05-23

## 2025-05-23 RX ORDER — FOLIC ACID 1 MG/1
1 TABLET ORAL
Qty: 0 | Refills: 0 | DISCHARGE
Start: 2025-05-23

## 2025-05-23 RX ORDER — METOPROLOL SUCCINATE 50 MG/1
1 TABLET, EXTENDED RELEASE ORAL
Qty: 0 | Refills: 0 | DISCHARGE
Start: 2025-05-23

## 2025-05-23 RX ORDER — METHOCARBAMOL 500 MG/1
1 TABLET, FILM COATED ORAL
Qty: 0 | Refills: 0 | DISCHARGE
Start: 2025-05-23

## 2025-05-23 RX ORDER — MELATONIN 5 MG
1 TABLET ORAL
Refills: 0 | DISCHARGE

## 2025-05-23 RX ORDER — SENNA 187 MG
2 TABLET ORAL
Qty: 0 | Refills: 0 | DISCHARGE
Start: 2025-05-23

## 2025-05-23 RX ORDER — POLYETHYLENE GLYCOL 3350 17 G/17G
17 POWDER, FOR SOLUTION ORAL
Qty: 0 | Refills: 0 | DISCHARGE
Start: 2025-05-23

## 2025-05-23 RX ORDER — MELATONIN 5 MG
1 TABLET ORAL
Qty: 0 | Refills: 0 | DISCHARGE
Start: 2025-05-23

## 2025-05-23 RX ORDER — GABAPENTIN 400 MG/1
1 CAPSULE ORAL
Qty: 0 | Refills: 0 | DISCHARGE
Start: 2025-05-23

## 2025-05-23 RX ORDER — FUROSEMIDE 10 MG/ML
40 INJECTION INTRAMUSCULAR; INTRAVENOUS DAILY
Refills: 0 | Status: DISCONTINUED | OUTPATIENT
Start: 2025-05-23 | End: 2025-05-24

## 2025-05-23 RX ORDER — ASPIRIN 325 MG
1 TABLET ORAL
Qty: 0 | Refills: 0 | DISCHARGE
Start: 2025-05-23

## 2025-05-23 RX ORDER — ASPIRIN 325 MG
81 TABLET ORAL DAILY
Refills: 0 | Status: DISCONTINUED | OUTPATIENT
Start: 2025-05-24 | End: 2025-05-24

## 2025-05-23 RX ORDER — SENNA 187 MG
2 TABLET ORAL
Refills: 0 | DISCHARGE

## 2025-05-23 RX ADMIN — ATORVASTATIN CALCIUM 20 MILLIGRAM(S): 80 TABLET, FILM COATED ORAL at 21:36

## 2025-05-23 RX ADMIN — NICOTINE POLACRILEX 1 PATCH: 4 GUM, CHEWING ORAL at 19:53

## 2025-05-23 RX ADMIN — Medication 1 GRAM(S): at 05:24

## 2025-05-23 RX ADMIN — METOPROLOL SUCCINATE 25 MILLIGRAM(S): 50 TABLET, EXTENDED RELEASE ORAL at 17:19

## 2025-05-23 RX ADMIN — METOPROLOL SUCCINATE 25 MILLIGRAM(S): 50 TABLET, EXTENDED RELEASE ORAL at 05:24

## 2025-05-23 RX ADMIN — DULOXETINE 30 MILLIGRAM(S): 20 CAPSULE, DELAYED RELEASE ORAL at 11:19

## 2025-05-23 RX ADMIN — Medication 2 TABLET(S): at 21:37

## 2025-05-23 RX ADMIN — Medication 20 MILLIEQUIVALENT(S): at 11:19

## 2025-05-23 RX ADMIN — Medication 1 GRAM(S): at 17:19

## 2025-05-23 RX ADMIN — FOLIC ACID 1 MILLIGRAM(S): 1 TABLET ORAL at 11:19

## 2025-05-23 RX ADMIN — Medication 40 MILLIGRAM(S): at 05:24

## 2025-05-23 RX ADMIN — FINASTERIDE 5 MILLIGRAM(S): 1 TABLET, FILM COATED ORAL at 11:19

## 2025-05-23 RX ADMIN — NICOTINE POLACRILEX 1 PATCH: 4 GUM, CHEWING ORAL at 11:22

## 2025-05-23 RX ADMIN — Medication 1 TABLET(S): at 05:24

## 2025-05-23 RX ADMIN — APIXABAN 5 MILLIGRAM(S): 2.5 TABLET, FILM COATED ORAL at 17:19

## 2025-05-23 RX ADMIN — Medication 100 MILLIGRAM(S): at 11:19

## 2025-05-23 RX ADMIN — FUROSEMIDE 20 MILLIGRAM(S): 10 INJECTION INTRAMUSCULAR; INTRAVENOUS at 05:24

## 2025-05-23 RX ADMIN — TAMSULOSIN HYDROCHLORIDE 0.4 MILLIGRAM(S): 0.4 CAPSULE ORAL at 21:36

## 2025-05-23 RX ADMIN — POLYETHYLENE GLYCOL 3350 17 GRAM(S): 17 POWDER, FOR SOLUTION ORAL at 11:18

## 2025-05-23 RX ADMIN — FUROSEMIDE 40 MILLIGRAM(S): 10 INJECTION INTRAMUSCULAR; INTRAVENOUS at 18:32

## 2025-05-23 RX ADMIN — Medication 1 TABLET(S): at 17:19

## 2025-05-23 RX ADMIN — CLOPIDOGREL BISULFATE 75 MILLIGRAM(S): 75 TABLET, FILM COATED ORAL at 11:19

## 2025-05-23 RX ADMIN — Medication 25 MICROGRAM(S): at 05:24

## 2025-05-23 RX ADMIN — Medication 3 MILLIGRAM(S): at 21:37

## 2025-05-23 RX ADMIN — APIXABAN 2.5 MILLIGRAM(S): 2.5 TABLET, FILM COATED ORAL at 05:24

## 2025-05-23 RX ADMIN — GABAPENTIN 100 MILLIGRAM(S): 400 CAPSULE ORAL at 21:36

## 2025-05-23 NOTE — DISCHARGE NOTE NURSING/CASE MANAGEMENT/SOCIAL WORK - NSDCVIVACCINE_GEN_ALL_CORE_FT
Tdap; 16-Jul-2020 00:21; Tawny Turner (RN); [employer]; [id number] (Exp. Date: 04-Apr-2022); IntraMuscular; Deltoid Left.; 0.5 milliLiter(s); VIS (VIS Published: 09-May-2013, VIS Presented: 16-Jul-2020);

## 2025-05-23 NOTE — PROGRESS NOTE ADULT - SUBJECTIVE AND OBJECTIVE BOX
Interval History:    CENTRAL LINE:   [  ] YES       [  ] NO  CHARLES:                 [  ] YES       [  ] NO         REVIEW OF SYSTEMS:  All Systems below were reviewed and are negative [  ]  HEENT:  ID:  Pulmonary:  Cardiac:  GI:  Renal:  Musculoskeletal:  All other systems above were reviewed and are negative   [  ]      MEDICATIONS  (STANDING):  apixaban 5 milliGRAM(s) Oral every 12 hours  atorvastatin 20 milliGRAM(s) Oral at bedtime  DULoxetine 30 milliGRAM(s) Oral daily  finasteride 5 milliGRAM(s) Oral daily  folic acid 1 milliGRAM(s) Oral daily  furosemide    Tablet 40 milliGRAM(s) Oral daily  gabapentin 100 milliGRAM(s) Oral at bedtime  lactobacillus acidophilus 1 Tablet(s) Oral every 12 hours  levothyroxine 25 MICROGram(s) Oral daily  lidocaine   4% Patch 1 Patch Transdermal daily  melatonin 3 milliGRAM(s) Oral at bedtime  metoprolol tartrate 25 milliGRAM(s) Oral every 12 hours  nicotine -   7 mG/24Hr(s) Patch 1 Patch Transdermal daily  pantoprazole    Tablet 40 milliGRAM(s) Oral before breakfast  polyethylene glycol 3350 17 Gram(s) Oral daily  potassium chloride    Tablet ER 20 milliEquivalent(s) Oral daily  senna 2 Tablet(s) Oral at bedtime  sodium chloride 1 Gram(s) Oral every 12 hours  sucralfate 1 Gram(s) Oral two times a day  tamsulosin 0.4 milliGRAM(s) Oral at bedtime  thiamine 100 milliGRAM(s) Oral daily    MEDICATIONS  (PRN):  acetaminophen     Tablet .. 650 milliGRAM(s) Oral every 6 hours PRN Temp greater or equal to 38C (100.4F), Mild Pain (1 - 3)  aluminum hydroxide/magnesium hydroxide/simethicone Suspension 30 milliLiter(s) Oral every 4 hours PRN Dyspepsia  guaiFENesin Oral Liquid (Sugar-Free) 200 milliGRAM(s) Oral every 6 hours PRN Cough  hydrALAZINE Injectable 10 milliGRAM(s) IV Push every 4 hours PRN for systolic BP>170  methocarbamol 500 milliGRAM(s) Oral every 8 hours PRN Muscle Spasm  ondansetron Injectable 4 milliGRAM(s) IV Push every 8 hours PRN Nausea and/or Vomiting  oxyCODONE    IR 5 milliGRAM(s) Oral every 4 hours PRN Severe Pain (7 - 10)      Vital Signs Last 24 Hrs  T(C): 36.6 (23 May 2025 12:27), Max: 36.6 (23 May 2025 04:46)  T(F): 97.8 (23 May 2025 12:27), Max: 97.8 (23 May 2025 04:46)  HR: 96 (23 May 2025 18:29) (58 - 96)  BP: 120/78 (23 May 2025 18:29) (111/69 - 120/78)  BP(mean): --  RR: 18 (23 May 2025 12:27) (18 - 18)  SpO2: 97% (23 May 2025 12:27) (97% - 97%)    Parameters below as of 23 May 2025 12:27  Patient On (Oxygen Delivery Method): nasal cannula        I&O's Summary    22 May 2025 07:01  -  23 May 2025 07:00  --------------------------------------------------------  IN: 900 mL / OUT: 1200 mL / NET: -300 mL        PHYSICAL EXAM:  HEENT: NC/AT; PERRLA  Neck: Soft; no tenderness  Lungs: CTA bilaterally; no wheezing.   Heart:  Abdomen:  Genital/ Rectal:  Extremities:  Neurologic:  Vascular:      LABORATORY:    CBC Full  -  ( 23 May 2025 06:30 )  WBC Count : 7.92 K/uL  RBC Count : 3.68 M/uL  Hemoglobin : 11.1 g/dL  Hematocrit : 33.6 %  Platelet Count - Automated : 312 K/uL  Mean Cell Volume : 91.3 fl  Mean Cell Hemoglobin : 30.2 pg  Mean Cell Hemoglobin Concentration : 33.0 g/dL  Auto Neutrophil # : x  Auto Lymphocyte # : x  Auto Monocyte # : x  Auto Eosinophil # : x  Auto Basophil # : x  Auto Neutrophil % : x  Auto Lymphocyte % : x  Auto Monocyte % : x  Auto Eosinophil % : x  Auto Basophil % : x      ESR:                   05-21 @ 05:52  --    C-Reactive Protein:     05-21 @ 05:52  --    Procalcitonin:           05-21 @ 05:52   0.04      05-23    132[L]  |  94[L]  |  29[H]  ----------------------------<  102[H]  3.8   |  33[H]  |  1.50[H]    Ca    9.4      23 May 2025 06:30  Phos  3.8     05-23    TPro  7.0  /  Alb  3.5  /  TBili  0.6  /  DBili  x   /  AST  10[L]  /  ALT  12  /  AlkPhos  54  05-23      Rapid Respiratory Viral Panel Result        05-20 @ 17:16  Rapid RVP [facility]  Coronovirus --  Adenovirus --  Bordetella Pertussis --  Chlamydia Pneumonia --  Entero/Rhinovirus--  HKU1 Coronovirus --  HMPV Coronovirus --  Influenza A --  Influenza AH1 --  Influenza AH1 2009 --  Influenza AH3 --  Influenza B --  Mycoplasma Pneumoniae --  NL63 Coronovirus --  OC43 Coronovirus --  Parainfluenza 1 --  Parainfluenza 2 --  Parainfluenza 3 --  Parainfluenza 4 --  Resp Syncytial Virus --      Assessment and Plan:          Kevin Crystal MD   [phone].  He is feeling better  SOB has improved  No fevers    MEDICATIONS  (STANDING):  apixaban 5 milliGRAM(s) Oral every 12 hours  atorvastatin 20 milliGRAM(s) Oral at bedtime  DULoxetine 30 milliGRAM(s) Oral daily  finasteride 5 milliGRAM(s) Oral daily  folic acid 1 milliGRAM(s) Oral daily  furosemide    Tablet 40 milliGRAM(s) Oral daily  gabapentin 100 milliGRAM(s) Oral at bedtime  lactobacillus acidophilus 1 Tablet(s) Oral every 12 hours  levothyroxine 25 MICROGram(s) Oral daily  lidocaine   4% Patch 1 Patch Transdermal daily  melatonin 3 milliGRAM(s) Oral at bedtime  metoprolol tartrate 25 milliGRAM(s) Oral every 12 hours  nicotine -   7 mG/24Hr(s) Patch 1 Patch Transdermal daily  pantoprazole    Tablet 40 milliGRAM(s) Oral before breakfast  polyethylene glycol 3350 17 Gram(s) Oral daily  potassium chloride    Tablet ER 20 milliEquivalent(s) Oral daily  senna 2 Tablet(s) Oral at bedtime  sodium chloride 1 Gram(s) Oral every 12 hours  sucralfate 1 Gram(s) Oral two times a day  tamsulosin 0.4 milliGRAM(s) Oral at bedtime  thiamine 100 milliGRAM(s) Oral daily    MEDICATIONS  (PRN):  acetaminophen     Tablet .. 650 milliGRAM(s) Oral every 6 hours PRN Temp greater or equal to 38C (100.4F), Mild Pain (1 - 3)  aluminum hydroxide/magnesium hydroxide/simethicone Suspension 30 milliLiter(s) Oral every 4 hours PRN Dyspepsia  guaiFENesin Oral Liquid (Sugar-Free) 200 milliGRAM(s) Oral every 6 hours PRN Cough  hydrALAZINE Injectable 10 milliGRAM(s) IV Push every 4 hours PRN for systolic BP>170  methocarbamol 500 milliGRAM(s) Oral every 8 hours PRN Muscle Spasm  ondansetron Injectable 4 milliGRAM(s) IV Push every 8 hours PRN Nausea and/or Vomiting  oxyCODONE    IR 5 milliGRAM(s) Oral every 4 hours PRN Severe Pain (7 - 10)      Vital Signs Last 24 Hrs  T(C): 36.6 (23 May 2025 12:27), Max: 36.6 (23 May 2025 04:46)  T(F): 97.8 (23 May 2025 12:27), Max: 97.8 (23 May 2025 04:46)  HR: 96 (23 May 2025 18:29) (58 - 96)  BP: 120/78 (23 May 2025 18:29) (111/69 - 120/78)  BP(mean): --  RR: 18 (23 May 2025 12:27) (18 - 18)  SpO2: 97% (23 May 2025 12:27) (97% - 97%)    Parameters below as of 23 May 2025 12:27  Patient On (Oxygen Delivery Method): nasal cannula        I&O's Summary    22 May 2025 07:01  -  23 May 2025 07:00  --------------------------------------------------------  IN: 900 mL / OUT: 1200 mL / NET: -300 mL        PHYSICAL EXAM:  HEENT: NC/AT; PERRLA  Neck: Soft; no tenderness  Lungs: Decreased BS bilaterally; no wheezing.   Heart: RRR, no murmurs.  Abdomen: Soft, no tenderness.   Genital/ Rectal: No glasgow catheter  Extremities: No ulcers.   Neurologic: Awake.      LABORATORY:    CBC Full  -  ( 23 May 2025 06:30 )  WBC Count : 7.92 K/uL  RBC Count : 3.68 M/uL  Hemoglobin : 11.1 g/dL  Hematocrit : 33.6 %  Platelet Count - Automated : 312 K/uL  Mean Cell Volume : 91.3 fl  Mean Cell Hemoglobin : 30.2 pg  Mean Cell Hemoglobin Concentration : 33.0 g/dL  Auto Neutrophil # : x  Auto Lymphocyte # : x  Auto Monocyte # : x  Auto Eosinophil # : x  Auto Basophil # : x  Auto Neutrophil % : x  Auto Lymphocyte % : x  Auto Monocyte % : x  Auto Eosinophil % : x  Auto Basophil % : x      ESR:                   05-21 @ 05:52  --    C-Reactive Protein:     05-21 @ 05:52  --    Procalcitonin:           05-21 @ 05:52   0.04      05-23    132[L]  |  94[L]  |  29[H]  ----------------------------<  102[H]  3.8   |  33[H]  |  1.50[H]    Ca    9.4      23 May 2025 06:30  Phos  3.8     05-23    TPro  7.0  /  Alb  3.5  /  TBili  0.6  /  DBili  x   /  AST  10[L]  /  ALT  12  /  AlkPhos  54  05-23      Rapid Respiratory Viral Panel Result        05-20 @ 17:16  Rapid RVP [facility]  Coronovirus --  Adenovirus --  Bordetella Pertussis --  Chlamydia Pneumonia --  Entero/Rhinovirus--  HKU1 Coronovirus --  HMPV Coronovirus --  Influenza A --  Influenza AH1 --  Influenza AH1 2009 --  Influenza AH3 --  Influenza B --  Mycoplasma Pneumoniae --  NL63 Coronovirus --  OC43 Coronovirus --  Parainfluenza 1 --  Parainfluenza 2 --  Parainfluenza 3 --  Parainfluenza 4 --  Resp Syncytial Virus --      Assessment and Plan:    1. CHF exacerbation.  2. Dyspnea and hypoxia  3. Chronic ventral abdominal wall hernia.    . Low suspicion for pneumonia. The patient more likely has CHF exacerbation with elevated ProBNP.  . Monitor off antibiotics.  . Continue diuresis with IV Lasix.  . Cardiology evaluation note  . Supportive care.        Kevin Crystal MD   [phone].

## 2025-05-23 NOTE — DISCHARGE NOTE PROVIDER - HOSPITAL COURSE
79M with PMh of CAD s/p stents, HTN, HLD, tobacco use h/o etoh use disorder who presents with shortness of breath and elevated BP. patient reports that his main concern for his BP being elevated and noticed he was wheezing. he admits to smoking but states he usually does not wheeze. denies fevers or chills, no chest pain, or vomiting. also mentions some increased leg swelling recently Admit to monitored unit for cardiac monitoring, obtain echo to evaluate LVEF, intravenous diuresis as per card consult , monitor ins/outs, monitor renal profile and electrolytes closely ,send 3 sets of enzymes, O2 supply, serial chest xrays, monitor weights and oral intake of fluids, nutritionist consult Found to have pneumonia Admitted for septic workup and evaluation ,send blood and urine cx,serial lactate levels ,monitor vitals ,ivfs hydration ,monitor urine output and renal profile ,iv abx initiated       Problem/Plan - 1:  ·  Problem: Acute respiratory failure with hypoxia.   ·  Plan: 2/2 to CHF EXACERBATION , POA.    Problem/Plan - 2:  ·  Problem: CHF exacerbation.   ·  Plan: Admit to monitored unit for cardiac monitoring, obtain echo to evaluate LVEF, intravenous diuresis as per card consult , monitor ins/outs, monitor renal profile and electrolytes closely ,send 3 sets of enzymes, O2 supply, serial chest xrays, monitor weights and oral intake of fluids, nutritionist consult.    Problem/Plan - 3:  ·  Problem: Edema leg.   ·  Plan: elevate LE.    Problem/Plan - 4:  ·  Problem: Hyponatremia.   ·  Plan: serial bmp , seen by nephrologist.    Problem/Plan - 5:  ·  Problem: Pneumonia, aspiration.   ·  Plan: ruled out.    Problem/Plan - 6:  ·  Problem: Emphysema lung.   ·  Plan: continue home medications , seen by pulm.    Problem/Plan - 7:  ·  Problem: Afib.   ·  Plan: Admitted  to telemetry unit for monitoring , send 3 sets of cardiac enzymes to rule out acute coronary event, obtain ECHO to evaluate LVEF, cardiology consult  ,continue current management, O2 supply, anticoagulation plan as per cardiology consult.    Problem/Plan - 8:  ·  Problem: HTN (hypertension).   ·  Plan: Admitted  to telemetry unit for monitoring , send 3 sets of cardiac enzymes to rule out acute coronary event, obtain ECHO to evaluate LVEF, cardiology consult  ,continue current management, O2 supply, anticoagulation plan as per cardiology consult.    Problem/Plan - 9:  ·  Problem: CAD (coronary artery disease).   ·  Plan: Admitted  to telemetry unit for monitoring , send 3 sets of cardiac enzymes to rule out acute coronary event, obtain ECHO to evaluate LVEF, cardiology consult  ,continue current management, O2 supply, anticoagulation plan as per cardiology consult.    Problem/Plan - 10:  ·  Problem: Hypothyroidism.   ·  Plan; continue home medications.    Problem/Plan - 11:  ·  Problem: Prophylactic measure.   ·  Plan: Gastrointestinal stress ulcer prophylaxis and DVT prophylaxis administered.

## 2025-05-23 NOTE — DISCHARGE NOTE PROVIDER - NSDCCAREPROVSEEN_GEN_ALL_CORE_FT
Christina, Forrest Cullen, Fahrina Kumar, Abebe Crystal, Kevin Marcos, Adalgisa Tovar, Inocencio Ritter, Adam Trammell

## 2025-05-23 NOTE — PROGRESS NOTE ADULT - PROBLEM SELECTOR PLAN 2
Admit to monitored unit for cardiac monitoring, obtain echo to evaluate LVEF, intravenous diuresis as per card consult , monitor ins/outs, monitor renal profile and electrolytes closely ,send 3 sets of enzymes, O2 supply, serial chest xrays, monitor weights and oral intake of fluids, nutritionist consult

## 2025-05-23 NOTE — PROGRESS NOTE ADULT - SUBJECTIVE AND OBJECTIVE BOX
CHIEF COMPLAINT/ REASON FOR VISIT  .. Patient was seen to address the  issue listed under PROBLEM LIST which is located toward bottom of this note     BERNARDO KEITH    [facility] [address]    Allergies    No Known Allergies  Allergy Status Unknown    Intolerances        PAST MEDICAL & SURGICAL HISTORY:  Benign Essential Hypertension      Hyperlipidemia      Acute Myocardial Infarction      Coronary Stent      Personal History of Tobacco Use      ETOH Abuse      CAD (Coronary Atherosclerotic Disease)      Chronic atrial fibrillation      Pulmonary embolism      Anemia due to acute blood loss      Heart failure      Hypothyroid      Alcohol use disorder, mild, abuse      Hypothyroidism      Benign essential HTN      BPH (benign prostatic hyperplasia)      Smokes tobacco daily      Alcohol use disorder, mild, abuse      No significant past surgical history      S/P CABG x 1      S/P CABG (coronary artery bypass graft)      Coronary stent patent          FAMILY HISTORY:  FH: hypertension        Home Medications:  apixaban 2.5 mg oral tablet: 1 tab(s) orally 2 times a day (21 May 2025 08:24)  atorvastatin 20 mg oral tablet: 1 tab(s) orally once a day (at bedtime) (21 May 2025 08:24)  diclofenac topical: Apply 2 grams topically to both knees 2 times a day (21 May 2025 08:30)  DULoxetine 30 mg oral delayed release capsule: 1 cap(s) orally once a day (21 May 2025 08:24)  finasteride 5 mg oral tablet: 1 tab(s) orally once a day (21 May 2025 08:24)  gabapentin 100 mg oral capsule: 1 cap(s) orally once a day (at bedtime) (21 May 2025 08:24)  levothyroxine 25 mcg (0.025 mg) oral tablet: 1 tab(s) orally once a day (21 May 2025 08:24)  melatonin 3 mg oral tablet: 1 tab(s) orally once a day (at bedtime) (21 May 2025 08:46)  Metoprolol Tartrate 25 mg oral tablet: 1 tab(s) orally 2 times a day (21 May 2025 08:24)  Plavix 75 mg oral tablet: 1 tab(s) orally once a day (21 May 2025 08:46)  Senna 8.6 mg oral tablet: 2 tab(s) orally once a day (at bedtime) (21 May 2025 08:46)  Sodium Chloride 1000 mg oral tablet, soluble: 1 tab(s) orally every 8 hours for 10 days (21 May 2025 08:46)  sucralfate 1 g oral tablet: 1 tab(s) orally 2 times a day Give 1 hour before meals. (21 May 2025 09:47)  tamsulosin 0.4 mg oral capsule: 1 cap(s) orally once a day (at bedtime) (21 May 2025 08:24)  Tylenol 325 mg oral tablet: 2 tab(s) orally every 12 hours as needed for  mild pain (21 May 2025 08:31)      MEDICATIONS  (STANDING):  apixaban 2.5 milliGRAM(s) Oral every 12 hours  atorvastatin 20 milliGRAM(s) Oral at bedtime  clopidogrel Tablet 75 milliGRAM(s) Oral daily  DULoxetine 30 milliGRAM(s) Oral daily  finasteride 5 milliGRAM(s) Oral daily  folic acid 1 milliGRAM(s) Oral daily  furosemide   Injectable 20 milliGRAM(s) IV Push daily  gabapentin 100 milliGRAM(s) Oral at bedtime  lactobacillus acidophilus 1 Tablet(s) Oral every 12 hours  levothyroxine 25 MICROGram(s) Oral daily  lidocaine   4% Patch 1 Patch Transdermal daily  melatonin 3 milliGRAM(s) Oral at bedtime  metoprolol tartrate 25 milliGRAM(s) Oral every 12 hours  nicotine -   7 mG/24Hr(s) Patch 1 Patch Transdermal daily  pantoprazole    Tablet 40 milliGRAM(s) Oral before breakfast  polyethylene glycol 3350 17 Gram(s) Oral daily  potassium chloride    Tablet ER 20 milliEquivalent(s) Oral daily  senna 2 Tablet(s) Oral at bedtime  sodium chloride 1 Gram(s) Oral every 12 hours  sucralfate 1 Gram(s) Oral two times a day  tamsulosin 0.4 milliGRAM(s) Oral at bedtime  thiamine 100 milliGRAM(s) Oral daily    MEDICATIONS  (PRN):  acetaminophen     Tablet .. 650 milliGRAM(s) Oral every 6 hours PRN Temp greater or equal to 38C (100.4F), Mild Pain (1 - 3)  aluminum hydroxide/magnesium hydroxide/simethicone Suspension 30 milliLiter(s) Oral every 4 hours PRN Dyspepsia  guaiFENesin Oral Liquid (Sugar-Free) 200 milliGRAM(s) Oral every 6 hours PRN Cough  hydrALAZINE Injectable 10 milliGRAM(s) IV Push every 4 hours PRN for systolic BP>170  methocarbamol 500 milliGRAM(s) Oral every 8 hours PRN Muscle Spasm  ondansetron Injectable 4 milliGRAM(s) IV Push every 8 hours PRN Nausea and/or Vomiting  oxyCODONE    IR 5 milliGRAM(s) Oral every 4 hours PRN Severe Pain (7 - 10)      Diet, DASH/TLC:   Sodium & Cholesterol Restricted  1200mL Fluid Restriction ([id number]) (05-22-25 @ 14:46) [Active]          Vital Signs Last 24 Hrs  T(C): 36.6 (23 May 2025 04:46), Max: 36.6 (22 May 2025 19:56)  T(F): 97.8 (23 May 2025 04:46), Max: 97.9 (22 May 2025 19:56)  HR: 61 (23 May 2025 04:46) (49 - 61)  BP: 119/77 (23 May 2025 04:46) (95/62 - 136/79)  BP(mean): --  RR: 18 (23 May 2025 04:46) (18 - 18)  SpO2: 97% (23 May 2025 04:46) (93% - 98%)    Parameters below as of 23 May 2025 04:46  Patient On (Oxygen Delivery Method): nasal cannula  O2 Flow (L/min): 2        05-22-25 @ 07:01  -  05-23-25 @ 07:00  --------------------------------------------------------  IN: 900 mL / OUT: 1200 mL / NET: -300 mL              LABS:                        11.1   7.92  )-----------( 312      ( 23 May 2025 06:30 )             33.6     05-23    132[L]  |  94[L]  |  29[H]  ----------------------------<  102[H]  3.8   |  33[H]  |  1.50[H]    Ca    9.4      23 May 2025 06:30  Phos  3.8     05-23    TPro  7.0  /  Alb  3.5  /  TBili  0.6  /  DBili  x   /  AST  10[L]  /  ALT  12  /  AlkPhos  54  05-23    PT/INR - ( 23 May 2025 06:30 )   PT: 13.8 sec;   INR: 1.18 ratio           Urinalysis Basic - ( 23 May 2025 06:30 )    Color: x / Appearance: x / SG: x / pH: x  Gluc: 102 mg/dL / Ketone: x  / Bili: x / Urobili: x   Blood: x / Protein: x / Nitrite: x   Leuk Esterase: x / RBC: x / WBC x   Sq Epi: x / Non Sq Epi: x / Bacteria: x            WBC:  WBC Count: 7.92 K/uL (05-23 @ 06:30)  WBC Count: 7.34 K/uL (05-22 @ 08:26)  WBC Count: 3.75 K/uL (05-21 @ 05:52)  WBC Count: 9.25 K/uL (05-20 @ 15:35)      MICROBIOLOGY:  RECENT CULTURES:  05-20 Blood Blood-Peripheral XXXX XXXX   No growth at 48 Hours    05-20 Blood Blood-Peripheral XXXX XXXX   No growth at 48 Hours                PT/INR - ( 23 May 2025 06:30 )   PT: 13.8 sec;   INR: 1.18 ratio             Sodium:  Sodium: 132 mmol/L (05-23 @ 06:30)  Sodium: 133 mmol/L (05-22 @ 08:26)  Sodium: 128 mmol/L (05-21 @ 05:52)  Sodium: 128 mmol/L (05-20 @ 15:35)      1.50 mg/dL 05-23 @ 06:30  1.60 mg/dL 05-22 @ 08:26  1.30 mg/dL 05-21 @ 05:52  1.30 mg/dL 05-20 @ 15:35      Hemoglobin:  Hemoglobin: 11.1 g/dL (05-23 @ 06:30)  Hemoglobin: 10.5 g/dL (05-22 @ 08:26)  Hemoglobin: 11.8 g/dL (05-21 @ 05:52)  Hemoglobin: 12.6 g/dL (05-20 @ 15:35)      Platelets: Platelet Count - Automated: 312 K/uL (05-23 @ 06:30)  Platelet Count - Automated: 298 K/uL (05-22 @ 08:26)  Platelet Count - Automated: 302 K/uL (05-21 @ 05:52)  Platelet Count - Automated: 353 K/uL (05-20 @ 15:35)      LIVER FUNCTIONS - ( 23 May 2025 06:30 )  Alb: 3.5 g/dL / Pro: 7.0 g/dL / ALK PHOS: 54 U/L / ALT: 12 U/L / AST: 10 U/L / GGT: x             Urinalysis Basic - ( 23 May 2025 06:30 )    Color: x / Appearance: x / SG: x / pH: x  Gluc: 102 mg/dL / Ketone: x  / Bili: x / Urobili: x   Blood: x / Protein: x / Nitrite: x   Leuk Esterase: x / RBC: x / WBC x   Sq Epi: x / Non Sq Epi: x / Bacteria: x        RADIOLOGY & ADDITIONAL STUDIES:      MICROBIOLOGY:  RECENT CULTURES:  05-20 Blood Blood-Peripheral XXXX XXXX   No growth at 48 Hours    05-20 Blood Blood-Peripheral XXXX XXXX   No growth at 48 Hours

## 2025-05-23 NOTE — DISCHARGE NOTE NURSING/CASE MANAGEMENT/SOCIAL WORK - NSDCPEFALRISK_GEN_ALL_CORE
For information on Fall & Injury Prevention, visit: [email or web address] OR  [email or web address] OR  https://www.cdc.gov/steadi/patient.html

## 2025-05-23 NOTE — DISCHARGE NOTE NURSING/CASE MANAGEMENT/SOCIAL WORK - NSDCFUADDAPPT_GEN_ALL_CORE_FT
Please follow with inhouse cardiologist Dr Jg Lovelace upon readmission to [facility] facility

## 2025-05-23 NOTE — PROGRESS NOTE ADULT - SUBJECTIVE AND OBJECTIVE BOX
PROGRESS NOTE  Patient is a 79y old  Male who presents with a chief complaint of sob and leg selling (23 May 2025 14:11)    Chart and available morning labs /imaging are reviewed electronically , urgent issues addressed . More information  is being added upon completion of rounds , when more information is collected and management discussed with consultants , medical staff and social service/case management on the floor   OVERNIGHT  No new issues reported by medical staff . All above noted Patient is resting in a bed comfortably .Confused ,poor mentation .No distress noted     HPI:  79M with PMh of CAD s/p stents, HTN, HLD, tobacco use h/o etoh use disorder who presents with shortness of breath and elevated BP. patient reports that his main concern for his BP being elevated and noticed he was wheezing. he admits to smoking but states he usually does not wheeze. denies fevers or chills, no chest pain, or vomiting. also mentions some increased leg swelling recently Admit to monitored unit for cardiac monitoring, obtain echo to evaluate LVEF, intravenous diuresis as per card consult , monitor ins/outs, monitor renal profile and electrolytes closely ,send 3 sets of enzymes, O2 supply, serial chest xrays, monitor weights and oral intake of fluids, nutritionist consult Found to have pneumonia Admitted for septic workup and evaluation ,send blood and urine cx,serial lactate levels ,monitor vitals ,ivfs hydration ,monitor urine output and renal profile ,iv abx initiated  (20 May 2025 20:15)    PAST MEDICAL & SURGICAL HISTORY:  Benign Essential Hypertension      Hyperlipidemia      Acute Myocardial Infarction      Coronary Stent      Personal History of Tobacco Use      ETOH Abuse      CAD (Coronary Atherosclerotic Disease)      Chronic atrial fibrillation      Pulmonary embolism      Anemia due to acute blood loss      Heart failure      Hypothyroid      Alcohol use disorder, mild, abuse      Hypothyroidism      Benign essential HTN      BPH (benign prostatic hyperplasia)      Smokes tobacco daily      Alcohol use disorder, mild, abuse      No significant past surgical history      S/P CABG x 1      S/P CABG (coronary artery bypass graft)      Coronary stent patent          MEDICATIONS  (STANDING):  apixaban 5 milliGRAM(s) Oral every 12 hours  atorvastatin 20 milliGRAM(s) Oral at bedtime  DULoxetine 30 milliGRAM(s) Oral daily  finasteride 5 milliGRAM(s) Oral daily  folic acid 1 milliGRAM(s) Oral daily  furosemide    Tablet 40 milliGRAM(s) Oral daily  gabapentin 100 milliGRAM(s) Oral at bedtime  lactobacillus acidophilus 1 Tablet(s) Oral every 12 hours  levothyroxine 25 MICROGram(s) Oral daily  lidocaine   4% Patch 1 Patch Transdermal daily  melatonin 3 milliGRAM(s) Oral at bedtime  metoprolol tartrate 25 milliGRAM(s) Oral every 12 hours  nicotine -   7 mG/24Hr(s) Patch 1 Patch Transdermal daily  pantoprazole    Tablet 40 milliGRAM(s) Oral before breakfast  polyethylene glycol 3350 17 Gram(s) Oral daily  potassium chloride    Tablet ER 20 milliEquivalent(s) Oral daily  senna 2 Tablet(s) Oral at bedtime  sodium chloride 1 Gram(s) Oral every 12 hours  sucralfate 1 Gram(s) Oral two times a day  tamsulosin 0.4 milliGRAM(s) Oral at bedtime  thiamine 100 milliGRAM(s) Oral daily    MEDICATIONS  (PRN):  acetaminophen     Tablet .. 650 milliGRAM(s) Oral every 6 hours PRN Temp greater or equal to 38C (100.4F), Mild Pain (1 - 3)  aluminum hydroxide/magnesium hydroxide/simethicone Suspension 30 milliLiter(s) Oral every 4 hours PRN Dyspepsia  guaiFENesin Oral Liquid (Sugar-Free) 200 milliGRAM(s) Oral every 6 hours PRN Cough  hydrALAZINE Injectable 10 milliGRAM(s) IV Push every 4 hours PRN for systolic BP>170  methocarbamol 500 milliGRAM(s) Oral every 8 hours PRN Muscle Spasm  ondansetron Injectable 4 milliGRAM(s) IV Push every 8 hours PRN Nausea and/or Vomiting  oxyCODONE    IR 5 milliGRAM(s) Oral every 4 hours PRN Severe Pain (7 - 10)      OBJECTIVE    T(C): 36.6 (05-23-25 @ 12:27), Max: 36.6 (05-22-25 @ 19:56)  HR: 58 (05-23-25 @ 12:27) (56 - 61)  BP: 111/69 (05-23-25 @ 12:27) (95/62 - 136/79)  RR: 18 (05-23-25 @ 12:27) (18 - 18)  SpO2: 97% (05-23-25 @ 12:27) (97% - 98%)  Wt(kg): --  I&O's Summary    22 May 2025 07:01  -  23 May 2025 07:00  --------------------------------------------------------  IN: 900 mL / OUT: 1200 mL / NET: -300 mL          REVIEW OF SYSTEMS:  CONSTITUTIONAL: No fever, weight loss, or fatigue  EYES: No eye pain, visual disturbances, or discharge  ENMT:   No sinus or throat pain  NECK: No pain or stiffness  RESPIRATORY: No cough, wheezing, chills or hemoptysis; No shortness of breath  CARDIOVASCULAR: No chest pain, palpitations, dizziness, or leg swelling  GASTROINTESTINAL: No abdominal pain. No nausea, vomiting; No diarrhea or constipation. No melena or hematochezia.  GENITOURINARY: No dysuria, frequency, hematuria, or incontinence  NEUROLOGICAL: No headaches, memory loss, loss of strength, numbness, or tremors  SKIN: No itching, burning, rashes, or lesions   MUSCULOSKELETAL: No joint pain or swelling; No muscle, back, or extremity pain    PHYSICAL EXAM:  Appearance: NAD. VS past 24 hrs -as above   HEENT:   Moist oral mucosa. Conjunctiva clear b/l.   Neck : supple  Respiratory: Lungs CTAB.  Gastrointestinal:  Soft, nontender. No rebound. No rigidity. BS present	  Cardiovascular: RRR ,S1S2 present  Neurologic: Non-focal. Moving all extremities.  Extremities: No edema. No erythema. No calf tenderness.  Skin: No rashes, No ecchymoses, No cyanosis.	  wounds ,skin lesions-See skin assesment flow sheet   LABS:                        11.1   7.92  )-----------( 312      ( 23 May 2025 06:30 )             33.6     05-23    132[L]  |  94[L]  |  29[H]  ----------------------------<  102[H]  3.8   |  33[H]  |  1.50[H]    Ca    9.4      23 May 2025 06:30  Phos  3.8     05-23    TPro  7.0  /  Alb  3.5  /  TBili  0.6  /  DBili  x   /  AST  10[L]  /  ALT  12  /  AlkPhos  54  05-23    CAPILLARY BLOOD GLUCOSE        PT/INR - ( 23 May 2025 06:30 )   PT: 13.8 sec;   INR: 1.18 ratio           Urinalysis Basic - ( 23 May 2025 06:30 )    Color: x / Appearance: x / SG: x / pH: x  Gluc: 102 mg/dL / Ketone: x  / Bili: x / Urobili: x   Blood: x / Protein: x / Nitrite: x   Leuk Esterase: x / RBC: x / WBC x   Sq Epi: x / Non Sq Epi: x / Bacteria: x        Urinalysis with Rflx Culture (collected 21 May 2025 04:53)    Culture - Blood (collected 20 May 2025 15:35)  Source: Blood Blood-Peripheral  Preliminary Report (23 May 2025 02:02):    No growth at 48 Hours    Culture - Blood (collected 20 May 2025 15:25)  Source: Blood Blood-Peripheral  Preliminary Report (23 May 2025 02:02):    No growth at 48 Hours      RADIOLOGY & ADDITIONAL TESTS:   reviewed elctronically  ASSESSMENT/PLAN: 	    25 minutes aggregate time was spent on this visit, 50% visit time spent in care co-ordination with other attendings and counselling patient .I have discussed care plan with patient / HCP/family member ,who expressed understanding of problems treatment and their effect and side effects, alternatives in details. I have asked if they have any questions and concerns and appropriately addressed them to best of my ability.

## 2025-05-23 NOTE — DISCHARGE NOTE NURSING/CASE MANAGEMENT/SOCIAL WORK - PATIENT PORTAL LINK FT
You can access the FollowMyHealth Patient Portal offered by [facility] by registering at the following website: [email or web address]. By joining [facility]’s FollowMyHealth portal, you will also be able to view your health information using other applications (apps) compatible with our system.

## 2025-05-23 NOTE — DISCHARGE NOTE PROVIDER - CARE PROVIDER_API CALL
VIKTOR MCGRAW  [address] [place] [phone]  Phone: [phone]  Fax: [phone])-  Follow Up Time: 1 week

## 2025-05-23 NOTE — PROGRESS NOTE ADULT - PROBLEM SELECTOR PLAN 7
Admitted  to telemetry unit for monitoring , send 3 sets of cardiac enzymes to rule out acute coronary event, obtain ECHO to evaluate LVEF, cardiology consult  ,continue current management, O2 supply, anticoagulation plan as per cardiology consult

## 2025-05-23 NOTE — DISCHARGE NOTE PROVIDER - NSDCFUADDAPPT_GEN_ALL_CORE_FT
Please follow with inhouse cardiologist Dr Jg Lovelace upon readmission to [facility] facility

## 2025-05-23 NOTE — DISCHARGE NOTE PROVIDER - NSDCCPCAREPLAN_GEN_ALL_CORE_FT
PRINCIPAL DISCHARGE DIAGNOSIS  Diagnosis: Acute respiratory failure with hypoxia  Assessment and Plan of Treatment:       SECONDARY DISCHARGE DIAGNOSES  Diagnosis: Acute on chronic combined systolic and diastolic congestive heart failure  Assessment and Plan of Treatment: EF 45 %POA    Diagnosis: Afib  Assessment and Plan of Treatment:     Diagnosis: COPD exacerbation  Assessment and Plan of Treatment:     Diagnosis: Emphysema lung  Assessment and Plan of Treatment:     Diagnosis: HTN (hypertension)  Assessment and Plan of Treatment:     Diagnosis: Hyponatremia  Assessment and Plan of Treatment:     Diagnosis: CAD (coronary artery disease)  Assessment and Plan of Treatment:     Diagnosis: Hypothyroidism  Assessment and Plan of Treatment:     Diagnosis: PNA (pneumonia)  Assessment and Plan of Treatment: ruled out by negtaive study    Diagnosis: Edema leg  Assessment and Plan of Treatment:     Diagnosis: Wheezing  Assessment and Plan of Treatment:

## 2025-05-23 NOTE — PROGRESS NOTE ADULT - PROBLEM SELECTOR PLAN 6
continue home medications , seen by pulm

## 2025-05-23 NOTE — SOCIAL WORK PROGRESS NOTE - NSSWPROGRESSNOTE_GEN_ALL_CORE
As per MD pt is for dc in the am back to [facility]. Ziggy has been in contact with pts family today who asked questions and requested that MD contact her. Lesvia requested today and faxed with all clinicals to [facility] admissions dept today. facility willing to accept pt back in the am. Ziggy has arranged for ambulance for 11:00 am. Pts family aware and agreeable to plan copy of chart in office

## 2025-05-23 NOTE — DISCHARGE NOTE NURSING/CASE MANAGEMENT/SOCIAL WORK - FINANCIAL ASSISTANCE
[facility] provides services at a reduced cost to those who are determined to be eligible through [facility]’s financial assistance program. Information regarding [facility]’s financial assistance program can be found by going to [email or web address] or by calling [phone].

## 2025-05-23 NOTE — DISCHARGE NOTE PROVIDER - NSDCMRMEDTOKEN_GEN_ALL_CORE_FT
apixaban 5 mg oral tablet: 1 tab(s) orally every 12 hours  aspirin 81 mg oral delayed release tablet: 1 tab(s) orally once a day  atorvastatin 20 mg oral tablet: 1 tab(s) orally once a day (at bedtime)  diclofenac topical: Apply 2 grams topically to both knees 2 times a day  DULoxetine 30 mg oral delayed release capsule: 1 cap(s) orally once a day  finasteride 5 mg oral tablet: 1 tab(s) orally once a day  folic acid 1 mg oral tablet: 1 tab(s) orally once a day  furosemide 40 mg oral tablet: 1 tab(s) orally once a day  gabapentin 100 mg oral capsule: 1 cap(s) orally once a day (at bedtime)  guaiFENesin 100 mg/5 mL oral liquid: 10 milliliter(s) orally every 6 hours As needed Cough  levothyroxine 25 mcg (0.025 mg) oral tablet: 1 tab(s) orally once a day  melatonin 3 mg oral tablet: 1 tab(s) orally once a day (at bedtime)  methocarbamol 500 mg oral tablet: 1 tab(s) orally every 8 hours As needed Muscle Spasm  metoprolol tartrate 25 mg oral tablet: 1 tab(s) orally every 12 hours  nicotine 7 mg/24 hr transdermal film, extended release: 1 patch transdermal once a day  polyethylene glycol 3350 oral powder for reconstitution: 17 gram(s) orally once a day  potassium chloride 20 mEq oral tablet, extended release: 1 tab(s) orally once a day  senna leaf extract oral tablet: 2 tab(s) orally once a day (at bedtime)  sodium chloride 1 g oral tablet: 1 tab(s) orally every 12 hours  tamsulosin 0.4 mg oral capsule: 1 cap(s) orally once a day (at bedtime)  thiamine 100 mg oral tablet: 1 tab(s) orally once a day  Tylenol 325 mg oral tablet: 2 tab(s) orally every 12 hours as needed for  mild pain

## 2025-05-23 NOTE — PROGRESS NOTE ADULT - SUBJECTIVE AND OBJECTIVE BOX
Patient is a 79y Male whom presented to the hospital with hyponatremia     PAST MEDICAL & SURGICAL HISTORY:  Benign Essential Hypertension      Hyperlipidemia      Acute Myocardial Infarction      Coronary Stent      Personal History of Tobacco Use      ETOH Abuse      CAD (Coronary Atherosclerotic Disease)      Chronic atrial fibrillation      Pulmonary embolism      Anemia due to acute blood loss      Heart failure      Hypothyroid      Alcohol use disorder, mild, abuse      Hypothyroidism      Benign essential HTN      BPH (benign prostatic hyperplasia)      Smokes tobacco daily      Alcohol use disorder, mild, abuse      No significant past surgical history      S/P CABG x 1      S/P CABG (coronary artery bypass graft)      Coronary stent patent          MEDICATIONS  (STANDING):  apixaban 2.5 milliGRAM(s) Oral every 12 hours  atorvastatin 20 milliGRAM(s) Oral at bedtime  clopidogrel Tablet 75 milliGRAM(s) Oral daily  DULoxetine 30 milliGRAM(s) Oral daily  finasteride 5 milliGRAM(s) Oral daily  folic acid 1 milliGRAM(s) Oral daily  furosemide   Injectable 20 milliGRAM(s) IV Push every 8 hours  gabapentin 100 milliGRAM(s) Oral at bedtime  lactobacillus acidophilus 1 Tablet(s) Oral every 12 hours  levothyroxine 25 MICROGram(s) Oral daily  lidocaine   4% Patch 1 Patch Transdermal daily  melatonin 3 milliGRAM(s) Oral at bedtime  metoprolol tartrate 25 milliGRAM(s) Oral every 12 hours  nicotine -   7 mG/24Hr(s) Patch 1 Patch Transdermal daily  pantoprazole    Tablet 40 milliGRAM(s) Oral before breakfast  polyethylene glycol 3350 17 Gram(s) Oral daily  potassium chloride    Tablet ER 20 milliEquivalent(s) Oral daily  senna 2 Tablet(s) Oral at bedtime  sodium chloride 1 Gram(s) Oral every 12 hours  sucralfate 1 Gram(s) Oral two times a day  tamsulosin 0.4 milliGRAM(s) Oral at bedtime  thiamine 100 milliGRAM(s) Oral daily      Allergies    No Known Allergies  Allergy Status Unknown    Intolerances        SOCIAL HISTORY:  Denies ETOh,Smoking,     FAMILY HISTORY:  FH: hypertension        REVIEW OF SYSTEMS:    CONSTITUTIONAL: No weakness, fevers or chills  RESPIRATORY: No cough, wheezing, hemoptysis; No shortness of breath  CARDIOVASCULAR: No chest pain or palpitations  GASTROINTESTINAL: No abdominal or epigastric pain. No nausea, vomiting,     No diarrhea or constipation. No melena   GENITOURINARY: No dysuria, frequency or hematuria  SKIN: dry                                                           11.1   7.92  )-----------( 312      ( 23 May 2025 06:30 )             33.6       CBC Full  -  ( 23 May 2025 06:30 )  WBC Count : 7.92 K/uL  RBC Count : 3.68 M/uL  Hemoglobin : 11.1 g/dL  Hematocrit : 33.6 %  Platelet Count - Automated : 312 K/uL  Mean Cell Volume : 91.3 fl  Mean Cell Hemoglobin : 30.2 pg  Mean Cell Hemoglobin Concentration : 33.0 g/dL  Auto Neutrophil # : x  Auto Lymphocyte # : x  Auto Monocyte # : x  Auto Eosinophil # : x  Auto Basophil # : x  Auto Neutrophil % : x  Auto Lymphocyte % : x  Auto Monocyte % : x  Auto Eosinophil % : x  Auto Basophil % : x      05-23    132[L]  |  94[L]  |  29[H]  ----------------------------<  102[H]  3.8   |  33[H]  |  1.50[H]    Ca    9.4      23 May 2025 06:30  Phos  3.8     05-23    TPro  7.0  /  Alb  3.5  /  TBili  0.6  /  DBili  x   /  AST  10[L]  /  ALT  12  /  AlkPhos  54  05-23      CAPILLARY BLOOD GLUCOSE          Vital Signs Last 24 Hrs  T(C): 36.6 (23 May 2025 12:27), Max: 36.6 (22 May 2025 19:56)  T(F): 97.8 (23 May 2025 12:27), Max: 97.9 (22 May 2025 19:56)  HR: 58 (23 May 2025 12:27) (56 - 61)  BP: 111/69 (23 May 2025 12:27) (95/62 - 136/79)  BP(mean): --  RR: 18 (23 May 2025 12:27) (18 - 18)  SpO2: 97% (23 May 2025 12:27) (97% - 98%)    Parameters below as of 23 May 2025 12:27  Patient On (Oxygen Delivery Method): nasal cannula        Urinalysis Basic - ( 23 May 2025 06:30 )    Color: x / Appearance: x / SG: x / pH: x  Gluc: 102 mg/dL / Ketone: x  / Bili: x / Urobili: x   Blood: x / Protein: x / Nitrite: x   Leuk Esterase: x / RBC: x / WBC x   Sq Epi: x / Non Sq Epi: x / Bacteria: x        PT/INR - ( 23 May 2025 06:30 )   PT: 13.8 sec;   INR: 1.18 ratio                              PHYSICAL EXAM:    Constitutional: NAD  HEENT: conjunctive   clear   Neck:  No JVD  Respiratory: decrease bs b/l   Cardiovascular: S1 and S2  Gastrointestinal: BS+, soft, NT/ND ,   Extremities: No peripheral edema        MEDICATIONS  (STANDING):  apixaban 2.5 milliGRAM(s) Oral every 12 hours  atorvastatin 20 milliGRAM(s) Oral at bedtime  clopidogrel Tablet 75 milliGRAM(s) Oral daily  DULoxetine 30 milliGRAM(s) Oral daily  finasteride 5 milliGRAM(s) Oral daily  folic acid 1 milliGRAM(s) Oral daily  furosemide   Injectable 20 milliGRAM(s) IV Push every 8 hours  gabapentin 100 milliGRAM(s) Oral at bedtime  lactobacillus acidophilus 1 Tablet(s) Oral every 12 hours  levothyroxine 25 MICROGram(s) Oral daily  lidocaine   4% Patch 1 Patch Transdermal daily  melatonin 3 milliGRAM(s) Oral at bedtime  metoprolol tartrate 25 milliGRAM(s) Oral every 12 hours  nicotine -   7 mG/24Hr(s) Patch 1 Patch Transdermal daily  pantoprazole    Tablet 40 milliGRAM(s) Oral before breakfast  polyethylene glycol 3350 17 Gram(s) Oral daily  potassium chloride    Tablet ER 20 milliEquivalent(s) Oral daily  senna 2 Tablet(s) Oral at bedtime  sodium chloride 1 Gram(s) Oral every 12 hours  sucralfate 1 Gram(s) Oral two times a day  tamsulosin 0.4 milliGRAM(s) Oral at bedtime  thiamine 100 milliGRAM(s) Oral daily                                                                      12.6   9.25  )-----------( 353      ( 20 May 2025 15:35 )             37.1       CBC Full  -  ( 20 May 2025 15:35 )  WBC Count : 9.25 K/uL  RBC Count : 4.18 M/uL  Hemoglobin : 12.6 g/dL  Hematocrit : 37.1 %  Platelet Count - Automated : 353 K/uL  Mean Cell Volume : 88.8 fl  Mean Cell Hemoglobin : 30.1 pg  Mean Cell Hemoglobin Concentration : 34.0 g/dL  Auto Neutrophil # : x  Auto Lymphocyte # : x  Auto Monocyte # : x  Auto Eosinophil # : x  Auto Basophil # : x  Auto Neutrophil % : x  Auto Lymphocyte % : x  Auto Monocyte % : x  Auto Eosinophil % : x  Auto Basophil % : x      05-20    128[L]  |  91[L]  |  16  ----------------------------<  122[H]  4.0   |  27  |  1.30    Ca    9.6      20 May 2025 15:35  Mg     1.9     05-20    TPro  8.3  /  Alb  4.0  /  TBili  1.0  /  DBili  x   /  AST  18  /  ALT  13  /  AlkPhos  72  05-20      CAPILLARY BLOOD GLUCOSE          Vital Signs Last 24 Hrs  T(C): 36.2 (20 May 2025 14:20), Max: 36.2 (20 May 2025 14:20)  T(F): 97.2 (20 May 2025 14:20), Max: 97.2 (20 May 2025 14:20)  HR: 107 (20 May 2025 14:20) (107 - 107)  BP: 180/117 (20 May 2025 14:20) (180/117 - 180/117)  BP(mean): --  RR: 22 (20 May 2025 14:20) (22 - 22)  SpO2: 96% (20 May 2025 14:20) (96% - 96%)        Urinalysis Basic - ( 20 May 2025 15:35 )    Color: x / Appearance: x / SG: x / pH: x  Gluc: 122 mg/dL / Ketone: x  / Bili: x / Urobili: x   Blood: x / Protein: x / Nitrite: x   Leuk Esterase: x / RBC: x / WBC x   Sq Epi: x / Non Sq Epi: x / Bacteria: x        PT/INR - ( 20 May 2025 15:35 )   PT: 14.3 sec;   INR: 1.21 ratio         PTT - ( 20 May 2025 15:35 )  PTT:37.7 sec

## 2025-05-23 NOTE — PROGRESS NOTE ADULT - PROBLEM SELECTOR PLAN 4
serial bmp , seen by nephrologist

## 2025-05-24 VITALS
DIASTOLIC BLOOD PRESSURE: 74 MMHG | HEART RATE: 80 BPM | RESPIRATION RATE: 18 BRPM | TEMPERATURE: 98 F | SYSTOLIC BLOOD PRESSURE: 112 MMHG | OXYGEN SATURATION: 97 %

## 2025-05-24 RX ADMIN — Medication 40 MILLIGRAM(S): at 05:04

## 2025-05-24 RX ADMIN — FINASTERIDE 5 MILLIGRAM(S): 1 TABLET, FILM COATED ORAL at 11:14

## 2025-05-24 RX ADMIN — NICOTINE POLACRILEX 1 PATCH: 4 GUM, CHEWING ORAL at 11:18

## 2025-05-24 RX ADMIN — Medication 25 MICROGRAM(S): at 05:03

## 2025-05-24 RX ADMIN — Medication 100 MILLIGRAM(S): at 11:14

## 2025-05-24 RX ADMIN — Medication 1 GRAM(S): at 05:04

## 2025-05-24 RX ADMIN — Medication 81 MILLIGRAM(S): at 11:14

## 2025-05-24 RX ADMIN — DULOXETINE 30 MILLIGRAM(S): 20 CAPSULE, DELAYED RELEASE ORAL at 11:14

## 2025-05-24 RX ADMIN — APIXABAN 5 MILLIGRAM(S): 2.5 TABLET, FILM COATED ORAL at 05:03

## 2025-05-24 RX ADMIN — Medication 1 GRAM(S): at 05:03

## 2025-05-24 RX ADMIN — Medication 1 TABLET(S): at 05:03

## 2025-05-24 RX ADMIN — FUROSEMIDE 40 MILLIGRAM(S): 10 INJECTION INTRAMUSCULAR; INTRAVENOUS at 05:04

## 2025-05-24 RX ADMIN — Medication 20 MILLIEQUIVALENT(S): at 11:14

## 2025-05-24 RX ADMIN — METOPROLOL SUCCINATE 25 MILLIGRAM(S): 50 TABLET, EXTENDED RELEASE ORAL at 05:04

## 2025-05-24 RX ADMIN — FOLIC ACID 1 MILLIGRAM(S): 1 TABLET ORAL at 11:14

## 2025-05-24 NOTE — PROGRESS NOTE ADULT - SUBJECTIVE AND OBJECTIVE BOX
Patient is a 79y Male whom presented to the hospital with hyponatremia     PAST MEDICAL & SURGICAL HISTORY:  Benign Essential Hypertension      Hyperlipidemia      Acute Myocardial Infarction      Coronary Stent      Personal History of Tobacco Use      ETOH Abuse      CAD (Coronary Atherosclerotic Disease)      Chronic atrial fibrillation      Pulmonary embolism      Anemia due to acute blood loss      Heart failure      Hypothyroid      Alcohol use disorder, mild, abuse      Hypothyroidism      Benign essential HTN      BPH (benign prostatic hyperplasia)      Smokes tobacco daily      Alcohol use disorder, mild, abuse      No significant past surgical history      S/P CABG x 1      S/P CABG (coronary artery bypass graft)      Coronary stent patent          MEDICATIONS  (STANDING):  apixaban 2.5 milliGRAM(s) Oral every 12 hours  atorvastatin 20 milliGRAM(s) Oral at bedtime  clopidogrel Tablet 75 milliGRAM(s) Oral daily  DULoxetine 30 milliGRAM(s) Oral daily  finasteride 5 milliGRAM(s) Oral daily  folic acid 1 milliGRAM(s) Oral daily  furosemide   Injectable 20 milliGRAM(s) IV Push every 8 hours  gabapentin 100 milliGRAM(s) Oral at bedtime  lactobacillus acidophilus 1 Tablet(s) Oral every 12 hours  levothyroxine 25 MICROGram(s) Oral daily  lidocaine   4% Patch 1 Patch Transdermal daily  melatonin 3 milliGRAM(s) Oral at bedtime  metoprolol tartrate 25 milliGRAM(s) Oral every 12 hours  nicotine -   7 mG/24Hr(s) Patch 1 Patch Transdermal daily  pantoprazole    Tablet 40 milliGRAM(s) Oral before breakfast  polyethylene glycol 3350 17 Gram(s) Oral daily  potassium chloride    Tablet ER 20 milliEquivalent(s) Oral daily  senna 2 Tablet(s) Oral at bedtime  sodium chloride 1 Gram(s) Oral every 12 hours  sucralfate 1 Gram(s) Oral two times a day  tamsulosin 0.4 milliGRAM(s) Oral at bedtime  thiamine 100 milliGRAM(s) Oral daily      Allergies    No Known Allergies  Allergy Status Unknown    Intolerances        SOCIAL HISTORY:  Denies ETOh,Smoking,     FAMILY HISTORY:  FH: hypertension        REVIEW OF SYSTEMS:    CONSTITUTIONAL: No weakness, fevers or chills  RESPIRATORY: No cough, wheezing, hemoptysis; No shortness of breath  CARDIOVASCULAR: No chest pain or palpitations  GASTROINTESTINAL: No abdominal or epigastric pain. No nausea, vomiting,     No diarrhea or constipation. No melena                               11.1   7.92  )-----------( 312      ( 23 May 2025 06:30 )             33.6       CBC Full  -  ( 23 May 2025 06:30 )  WBC Count : 7.92 K/uL  RBC Count : 3.68 M/uL  Hemoglobin : 11.1 g/dL  Hematocrit : 33.6 %  Platelet Count - Automated : 312 K/uL  Mean Cell Volume : 91.3 fl  Mean Cell Hemoglobin : 30.2 pg  Mean Cell Hemoglobin Concentration : 33.0 g/dL  Auto Neutrophil # : x  Auto Lymphocyte # : x  Auto Monocyte # : x  Auto Eosinophil # : x  Auto Basophil # : x  Auto Neutrophil % : x  Auto Lymphocyte % : x  Auto Monocyte % : x  Auto Eosinophil % : x  Auto Basophil % : x      05-23    132[L]  |  94[L]  |  29[H]  ----------------------------<  102[H]  3.8   |  33[H]  |  1.50[H]    Ca    9.4      23 May 2025 06:30  Phos  3.8     05-23    TPro  7.0  /  Alb  3.5  /  TBili  0.6  /  DBili  x   /  AST  10[L]  /  ALT  12  /  AlkPhos  54  05-23      CAPILLARY BLOOD GLUCOSE          Vital Signs Last 24 Hrs  T(C): 36.4 (24 May 2025 11:29), Max: 36.7 (23 May 2025 21:30)  T(F): 97.5 (24 May 2025 11:29), Max: 98 (23 May 2025 21:30)  HR: 80 (24 May 2025 11:29) (60 - 96)  BP: 112/74 (24 May 2025 11:29) (108/64 - 120/78)  BP(mean): --  RR: 18 (24 May 2025 11:29) (18 - 18)  SpO2: 97% (24 May 2025 11:29) (97% - 98%)    Parameters below as of 24 May 2025 11:29  Patient On (Oxygen Delivery Method): nasal cannula  O2 Flow (L/min): 2      Urinalysis Basic - ( 23 May 2025 06:30 )    Color: x / Appearance: x / SG: x / pH: x  Gluc: 102 mg/dL / Ketone: x  / Bili: x / Urobili: x   Blood: x / Protein: x / Nitrite: x   Leuk Esterase: x / RBC: x / WBC x   Sq Epi: x / Non Sq Epi: x / Bacteria: x        PT/INR - ( 23 May 2025 06:30 )   PT: 13.8 sec;   INR: 1.18 ratio                   PHYSICAL EXAM:    Constitutional: NAD  HEENT: conjunctive   clear   Neck:  No JVD  Respiratory: decrease bs b/l   Cardiovascular: S1 and S2  Gastrointestinal: BS+, soft, NT/ND ,   Extremities: No peripheral edema        MEDICATIONS  (STANDING):  apixaban 2.5 milliGRAM(s) Oral every 12 hours  atorvastatin 20 milliGRAM(s) Oral at bedtime  clopidogrel Tablet 75 milliGRAM(s) Oral daily  DULoxetine 30 milliGRAM(s) Oral daily  finasteride 5 milliGRAM(s) Oral daily  folic acid 1 milliGRAM(s) Oral daily  furosemide   Injectable 20 milliGRAM(s) IV Push every 8 hours  gabapentin 100 milliGRAM(s) Oral at bedtime  lactobacillus acidophilus 1 Tablet(s) Oral every 12 hours  levothyroxine 25 MICROGram(s) Oral daily  lidocaine   4% Patch 1 Patch Transdermal daily  melatonin 3 milliGRAM(s) Oral at bedtime  metoprolol tartrate 25 milliGRAM(s) Oral every 12 hours  nicotine -   7 mG/24Hr(s) Patch 1 Patch Transdermal daily  pantoprazole    Tablet 40 milliGRAM(s) Oral before breakfast  polyethylene glycol 3350 17 Gram(s) Oral daily  potassium chloride    Tablet ER 20 milliEquivalent(s) Oral daily  senna 2 Tablet(s) Oral at bedtime  sodium chloride 1 Gram(s) Oral every 12 hours  sucralfate 1 Gram(s) Oral two times a day  tamsulosin 0.4 milliGRAM(s) Oral at bedtime  thiamine 100 milliGRAM(s) Oral daily                                                                      12.6   9.25  )-----------( 353      ( 20 May 2025 15:35 )             37.1       CBC Full  -  ( 20 May 2025 15:35 )  WBC Count : 9.25 K/uL  RBC Count : 4.18 M/uL  Hemoglobin : 12.6 g/dL  Hematocrit : 37.1 %  Platelet Count - Automated : 353 K/uL  Mean Cell Volume : 88.8 fl  Mean Cell Hemoglobin : 30.1 pg  Mean Cell Hemoglobin Concentration : 34.0 g/dL  Auto Neutrophil # : x  Auto Lymphocyte # : x  Auto Monocyte # : x  Auto Eosinophil # : x  Auto Basophil # : x  Auto Neutrophil % : x  Auto Lymphocyte % : x  Auto Monocyte % : x  Auto Eosinophil % : x  Auto Basophil % : x      05-20    128[L]  |  91[L]  |  16  ----------------------------<  122[H]  4.0   |  27  |  1.30    Ca    9.6      20 May 2025 15:35  Mg     1.9     05-20    TPro  8.3  /  Alb  4.0  /  TBili  1.0  /  DBili  x   /  AST  18  /  ALT  13  /  AlkPhos  72  05-20      CAPILLARY BLOOD GLUCOSE          Vital Signs Last 24 Hrs  T(C): 36.2 (20 May 2025 14:20), Max: 36.2 (20 May 2025 14:20)  T(F): 97.2 (20 May 2025 14:20), Max: 97.2 (20 May 2025 14:20)  HR: 107 (20 May 2025 14:20) (107 - 107)  BP: 180/117 (20 May 2025 14:20) (180/117 - 180/117)  BP(mean): --  RR: 22 (20 May 2025 14:20) (22 - 22)  SpO2: 96% (20 May 2025 14:20) (96% - 96%)        Urinalysis Basic - ( 20 May 2025 15:35 )    Color: x / Appearance: x / SG: x / pH: x  Gluc: 122 mg/dL / Ketone: x  / Bili: x / Urobili: x   Blood: x / Protein: x / Nitrite: x   Leuk Esterase: x / RBC: x / WBC x   Sq Epi: x / Non Sq Epi: x / Bacteria: x        PT/INR - ( 20 May 2025 15:35 )   PT: 14.3 sec;   INR: 1.21 ratio         PTT - ( 20 May 2025 15:35 )  PTT:37.7 sec

## 2025-05-24 NOTE — PROGRESS NOTE ADULT - SUBJECTIVE AND OBJECTIVE BOX
CHIEF COMPLAINT/ REASON FOR VISIT  .. Patient was seen to address the  issue listed under PROBLEM LIST which is located toward bottom of this note     BERNARDO KEITH    [facility] [address]    Allergies    No Known Allergies  Allergy Status Unknown    Intolerances        PAST MEDICAL & SURGICAL HISTORY:  Benign Essential Hypertension      Hyperlipidemia      Acute Myocardial Infarction      Coronary Stent      Personal History of Tobacco Use      ETOH Abuse      CAD (Coronary Atherosclerotic Disease)      Chronic atrial fibrillation      Pulmonary embolism      Anemia due to acute blood loss      Heart failure      Hypothyroid      Alcohol use disorder, mild, abuse      Hypothyroidism      Benign essential HTN      BPH (benign prostatic hyperplasia)      Smokes tobacco daily      Alcohol use disorder, mild, abuse      No significant past surgical history      S/P CABG x 1      S/P CABG (coronary artery bypass graft)      Coronary stent patent          FAMILY HISTORY:  FH: hypertension        Home Medications:  apixaban 5 mg oral tablet: 1 tab(s) orally every 12 hours (23 May 2025 14:10)  aspirin 81 mg oral delayed release tablet: 1 tab(s) orally once a day (23 May 2025 14:10)  atorvastatin 20 mg oral tablet: 1 tab(s) orally once a day (at bedtime) (21 May 2025 08:24)  diclofenac topical: Apply 2 grams topically to both knees 2 times a day (21 May 2025 08:30)  DULoxetine 30 mg oral delayed release capsule: 1 cap(s) orally once a day (21 May 2025 08:24)  finasteride 5 mg oral tablet: 1 tab(s) orally once a day (21 May 2025 08:24)  folic acid 1 mg oral tablet: 1 tab(s) orally once a day (23 May 2025 14:10)  furosemide 40 mg oral tablet: 1 tab(s) orally once a day (23 May 2025 14:10)  gabapentin 100 mg oral capsule: 1 cap(s) orally once a day (at bedtime) (23 May 2025 14:10)  guaiFENesin 100 mg/5 mL oral liquid: 10 milliliter(s) orally every 6 hours As needed Cough (23 May 2025 14:10)  levothyroxine 25 mcg (0.025 mg) oral tablet: 1 tab(s) orally once a day (23 May 2025 14:10)  melatonin 3 mg oral tablet: 1 tab(s) orally once a day (at bedtime) (23 May 2025 14:10)  methocarbamol 500 mg oral tablet: 1 tab(s) orally every 8 hours As needed Muscle Spasm (23 May 2025 14:10)  metoprolol tartrate 25 mg oral tablet: 1 tab(s) orally every 12 hours (23 May 2025 14:10)  nicotine 7 mg/24 hr transdermal film, extended release: 1 patch transdermal once a day (23 May 2025 14:10)  polyethylene glycol 3350 oral powder for reconstitution: 17 gram(s) orally once a day (23 May 2025 14:10)  potassium chloride 20 mEq oral tablet, extended release: 1 tab(s) orally once a day (23 May 2025 14:10)  senna leaf extract oral tablet: 2 tab(s) orally once a day (at bedtime) (23 May 2025 14:10)  sodium chloride 1 g oral tablet: 1 tab(s) orally every 12 hours (23 May 2025 14:10)  tamsulosin 0.4 mg oral capsule: 1 cap(s) orally once a day (at bedtime) (21 May 2025 08:24)  thiamine 100 mg oral tablet: 1 tab(s) orally once a day (23 May 2025 14:10)  Tylenol 325 mg oral tablet: 2 tab(s) orally every 12 hours as needed for  mild pain (21 May 2025 08:31)      MEDICATIONS  (STANDING):  apixaban 5 milliGRAM(s) Oral every 12 hours  aspirin enteric coated 81 milliGRAM(s) Oral daily  atorvastatin 20 milliGRAM(s) Oral at bedtime  DULoxetine 30 milliGRAM(s) Oral daily  finasteride 5 milliGRAM(s) Oral daily  folic acid 1 milliGRAM(s) Oral daily  furosemide    Tablet 40 milliGRAM(s) Oral daily  gabapentin 100 milliGRAM(s) Oral at bedtime  lactobacillus acidophilus 1 Tablet(s) Oral every 12 hours  levothyroxine 25 MICROGram(s) Oral daily  lidocaine   4% Patch 1 Patch Transdermal daily  melatonin 3 milliGRAM(s) Oral at bedtime  metoprolol tartrate 25 milliGRAM(s) Oral every 12 hours  nicotine -   7 mG/24Hr(s) Patch 1 Patch Transdermal daily  pantoprazole    Tablet 40 milliGRAM(s) Oral before breakfast  polyethylene glycol 3350 17 Gram(s) Oral daily  potassium chloride    Tablet ER 20 milliEquivalent(s) Oral daily  senna 2 Tablet(s) Oral at bedtime  sucralfate 1 Gram(s) Oral two times a day  tamsulosin 0.4 milliGRAM(s) Oral at bedtime  thiamine 100 milliGRAM(s) Oral daily    MEDICATIONS  (PRN):  acetaminophen     Tablet .. 650 milliGRAM(s) Oral every 6 hours PRN Temp greater or equal to 38C (100.4F), Mild Pain (1 - 3)  aluminum hydroxide/magnesium hydroxide/simethicone Suspension 30 milliLiter(s) Oral every 4 hours PRN Dyspepsia  guaiFENesin Oral Liquid (Sugar-Free) 200 milliGRAM(s) Oral every 6 hours PRN Cough  hydrALAZINE Injectable 10 milliGRAM(s) IV Push every 4 hours PRN for systolic BP>170  methocarbamol 500 milliGRAM(s) Oral every 8 hours PRN Muscle Spasm  ondansetron Injectable 4 milliGRAM(s) IV Push every 8 hours PRN Nausea and/or Vomiting  oxyCODONE    IR 5 milliGRAM(s) Oral every 4 hours PRN Severe Pain (7 - 10)      Diet, DASH/TLC:   Sodium & Cholesterol Restricted  1200mL Fluid Restriction ([id number]) (05-22-25 @ 14:46) [Active]          Vital Signs Last 24 Hrs  T(C): 36.3 (24 May 2025 04:48), Max: 36.7 (23 May 2025 21:30)  T(F): 97.4 (24 May 2025 04:48), Max: 98 (23 May 2025 21:30)  HR: 60 (24 May 2025 04:48) (58 - 96)  BP: 108/64 (24 May 2025 04:48) (108/64 - 120/78)  BP(mean): --  RR: 18 (24 May 2025 04:48) (18 - 18)  SpO2: 97% (24 May 2025 04:48) (97% - 98%)    Parameters below as of 24 May 2025 04:48  Patient On (Oxygen Delivery Method): nasal cannula  O2 Flow (L/min): 2        05-22-25 @ 07:01  -  05-23-25 @ 07:00  --------------------------------------------------------  IN: 900 mL / OUT: 1200 mL / NET: -300 mL    05-23-25 @ 07:01  -  05-24-25 @ 05:34  --------------------------------------------------------  IN: 0 mL / OUT: 900 mL / NET: -900 mL              LABS:                        11.1   7.92  )-----------( 312      ( 23 May 2025 06:30 )             33.6     05-23    132[L]  |  94[L]  |  29[H]  ----------------------------<  102[H]  3.8   |  33[H]  |  1.50[H]    Ca    9.4      23 May 2025 06:30  Phos  3.8     05-23    TPro  7.0  /  Alb  3.5  /  TBili  0.6  /  DBili  x   /  AST  10[L]  /  ALT  12  /  AlkPhos  54  05-23    PT/INR - ( 23 May 2025 06:30 )   PT: 13.8 sec;   INR: 1.18 ratio           Urinalysis Basic - ( 23 May 2025 06:30 )    Color: x / Appearance: x / SG: x / pH: x  Gluc: 102 mg/dL / Ketone: x  / Bili: x / Urobili: x   Blood: x / Protein: x / Nitrite: x   Leuk Esterase: x / RBC: x / WBC x   Sq Epi: x / Non Sq Epi: x / Bacteria: x            WBC:  WBC Count: 7.92 K/uL (05-23 @ 06:30)  WBC Count: 7.34 K/uL (05-22 @ 08:26)  WBC Count: 3.75 K/uL (05-21 @ 05:52)  WBC Count: 9.25 K/uL (05-20 @ 15:35)      MICROBIOLOGY:  RECENT CULTURES:  05-20 Blood Blood-Peripheral XXXX XXXX   No growth at 72 Hours    05-20 Blood Blood-Peripheral XXXX XXXX   No growth at 72 Hours                PT/INR - ( 23 May 2025 06:30 )   PT: 13.8 sec;   INR: 1.18 ratio             Sodium:  Sodium: 132 mmol/L (05-23 @ 06:30)  Sodium: 133 mmol/L (05-22 @ 08:26)  Sodium: 128 mmol/L (05-21 @ 05:52)  Sodium: 128 mmol/L (05-20 @ 15:35)      1.50 mg/dL 05-23 @ 06:30  1.60 mg/dL 05-22 @ 08:26  1.30 mg/dL 05-21 @ 05:52  1.30 mg/dL 05-20 @ 15:35      Hemoglobin:  Hemoglobin: 11.1 g/dL (05-23 @ 06:30)  Hemoglobin: 10.5 g/dL (05-22 @ 08:26)  Hemoglobin: 11.8 g/dL (05-21 @ 05:52)  Hemoglobin: 12.6 g/dL (05-20 @ 15:35)      Platelets: Platelet Count - Automated: 312 K/uL (05-23 @ 06:30)  Platelet Count - Automated: 298 K/uL (05-22 @ 08:26)  Platelet Count - Automated: 302 K/uL (05-21 @ 05:52)  Platelet Count - Automated: 353 K/uL (05-20 @ 15:35)      LIVER FUNCTIONS - ( 23 May 2025 06:30 )  Alb: 3.5 g/dL / Pro: 7.0 g/dL / ALK PHOS: 54 U/L / ALT: 12 U/L / AST: 10 U/L / GGT: x             Urinalysis Basic - ( 23 May 2025 06:30 )    Color: x / Appearance: x / SG: x / pH: x  Gluc: 102 mg/dL / Ketone: x  / Bili: x / Urobili: x   Blood: x / Protein: x / Nitrite: x   Leuk Esterase: x / RBC: x / WBC x   Sq Epi: x / Non Sq Epi: x / Bacteria: x        RADIOLOGY & ADDITIONAL STUDIES:      MICROBIOLOGY:  RECENT CULTURES:  05-20 Blood Blood-Peripheral XXXX XXXX   No growth at 72 Hours    05-20 Blood Blood-Peripheral XXXX XXXX   No growth at 72 Hours

## 2025-05-24 NOTE — PROGRESS NOTE ADULT - ASSESSMENT
79M with PMh of CAD s/p stents, HTN, HLD, tobacco use h/o etoh use disorder who presents with shortness of breath and elevated BP. patient reports that his main concern for his BP being elevated and noticed he was wheezing. he admits to smoking but states he usually does not wheeze. denies fevers or chills, no chest pain, or vomiting. also mentions some increased leg swelling recently Admit to monitored unit for cardiac monitoring, obtain echo to evaluate LVEF, intravenous diuresis as per card consult , monitor ins/outs, monitor renal profile and electrolytes closely ,send 3 sets of enzymes, O2 supply, serial chest xrays, monitor weights and oral intake of fluids, nutritionist consult Found to have pneumonia Admitted for septic workup and evaluation ,send blood and urine cx,serial lactate levels ,monitor vitals ,ivfs hydration ,monitor urine output and renal profile ,iv abx initiated  (20 May 2025 20:15)    hyponatremia  will check ua , urine osmolality , urine sodium , urine uric acid , serum sodium , serum osmolality , serum uric acid , f/u with hyponatremia work up , f/u with bmp , monitor i and o      fluid overload   furosemide   Injectable 20 milliGRAM(s) IV Push every 8 hours      dvt apixaban 2.5 milliGRAM(s) Oral every 12 hours      BP monitoring,continue current antihypertensive meds, low salt diet,followup with PMD in 1-2 weeks  metoprolol tartrate 25 milliGRAM(s) Oral every 12 hours  
79M with PMh of CAD s/p stents, HTN, HLD, tobacco use h/o etoh use disorder who presents with shortness of breath and elevated BP. patient reports that his main concern for his BP being elevated and noticed he was wheezing. he admits to smoking but states he usually does not wheeze. denies fevers or chills, no chest pain, or vomiting. also mentions some increased leg swelling recently Admit to monitored unit for cardiac monitoring, obtain echo to evaluate LVEF, intravenous diuresis as per card consult , monitor ins/outs, monitor renal profile and electrolytes closely ,send 3 sets of enzymes, O2 supply, serial chest xrays, monitor weights and oral intake of fluids, nutritionist consult Found to have pneumonia Admitted for septic workup and evaluation ,send blood and urine cx,serial lactate levels ,monitor vitals ,ivfs hydration ,monitor urine output and renal profile ,iv abx initiated  (20 May 2025 20:15)      ACUTE RENAL FAILURE: due to diuresis   Serum creatinine is  at 1.5     , approximating GFR at   ml/min.   There is no progression . No uremic symptoms  No evidence of anemia .  Fluid status stable.  Will continue to avoid nephrotoxic drugs.  Patient remains asymptomatic.   Continue current therapy.  hold  diuretic.  hold   ACE inhibitor.  hold   ARB.  Additional evaluation:   ECG,    echocardiogram,     CXR,  will obtained recent   renal ultrasound to evalaute kidney size and possible stones ,          hyponatremia  will check ua , urine osmolality , urine sodium , urine uric acid , serum sodium , serum osmolality , serum uric acid , f/u with hyponatremia work up , f/u with bmp , monitor i and o      fluid overload   lasix prn       dvt apixaban 2.5 milliGRAM(s) Oral every 12 hours      BP monitoring,continue current antihypertensive meds, low salt diet,followup with PMD in 1-2 weeks  metoprolol tartrate 25 milliGRAM(s) Oral every 12 hours  
79M with PMh of CAD s/p stents, HTN, HLD, tobacco use h/o etoh use disorder who presents with shortness of breath and elevated BP. patient reports that his main concern for his BP being elevated and noticed he was wheezing. he admits to smoking but states he usually does not wheeze. denies fevers or chills, no chest pain, or vomiting. also mentions some increased leg swelling recently Admit to monitored unit for cardiac monitoring, obtain echo to evaluate LVEF, intravenous diuresis as per card consult , monitor ins/outs, monitor renal profile and electrolytes closely ,send 3 sets of enzymes, O2 supply, serial chest xrays, monitor weights and oral intake of fluids, nutritionist consult Found to have pneumonia Admitted for septic workup and evaluation ,send blood and urine cx,serial lactate levels ,monitor vitals ,ivfs hydration ,monitor urine output and renal profile ,iv abx initiated  (20 May 2025 20:15)    hyponatremia  will check ua , urine osmolality , urine sodium , urine uric acid , serum sodium , serum osmolality , serum uric acid , f/u with hyponatremia work up , f/u with bmp , monitor i and o      fluid overload   furosemide   Injectable 20 milliGRAM(s) IV Push every 8 hours      dvt apixaban 2.5 milliGRAM(s) Oral every 12 hours      BP monitoring,continue current antihypertensive meds, low salt diet,followup with PMD in 1-2 weeks  metoprolol tartrate 25 milliGRAM(s) Oral every 12 hours  
   REASON FOR VISIT  .. Management of problems listed below      EVENTS/CURRENT ISSUES.  . 5/21/2025 apixa 2.5 x 2 started by cardio for ho dvt (unclear when he had dvt)   . 5/21/2025 nicotine fa thia started         REVIEW OF SYMPTOMS   Able to give ROS  Yes     RELIABILITY +/-   CONSTITUTIONAL Weakness Yes    ENDOCRINE  No heat or cold intolerance    ALLERGY No hives  Sore throat No stridor  RESP Shortness of breath YES   NEURO New weakness No   CARDIAC   Palpitations No         PHYSICAL EXAM    HEENT Unremarkable  atraumatic   RESP Fair air entry  Harsh breath sound   CARDIAC S1 S2 No S3     NO JVD    ABDOMEN No hepatosplenomegaly   PEDAL EDEMA present No calf tenderness  NO rash     REASON FOR VISIT  .. Management of problems listed below         CC.   . 5/20/2025 c/o SOB. pt does wear O2 as needed. hx COPD, HTN, HLD, ABD hernia, neuropathy, CAD. pt is active smoker. AOX4 from [place]. HTN elevated. pt was given albuterol 1x , O2 via n/c 2L 97%.  shortness of breath, hypertension    OVERALL PRESENTATION.  . 5/20/2025 79M with PMh of CAD s/p stents, HTN, HLD, tobacco use h/o etoh use disorder who presents with shortness of breath and elevated BP. patient reports that his main concern for his BP being elevated and noticed he was wheezing. he admits to smoking but states he usually does not wheeze. denies fevers or chills, no chest pain, or vomiting. also mentions some increased leg swelling recently  PMH.        BEST PRACTICE ISSUES.  . HOB ELEVATN.    .... Yes  . DIET  .   .... 5/21/2025 dash  . FREE WATER.   ....   .  IV FLUID.  .....   . PHARMAC DVT PPLX .    .... APIXA 5/21/2025 A 2.5 x 2 started by Dr Brown   . NON PHARMAC DVT PPLX .      . STRESS ULCR PPLX .   ....   . DATE/DM MGMT.   ..... See under Endocrine section   GENERAL DATA .   . COVID.         .... scv2 5/20/2025 scv2 (-)   . GOC.    ....    . ICU STAY.    .... no   . INFECTION PPLX .   ....   . ALLGY.   ....  nka  . WT.   .... 5/20/2025 93  . BMI.  ....5/20/2025 31       XXXXXXXXXXXXXXXXXXXXXXX  VITALS/GAS EXCHANGE/DRIPS    ABGS.     .  VS/ PO/IO/ VENT/ DRIPS.   5/21/2025 afeb 67 140/80  5/21/2025 2l 96%      XXXXXXXXXXXXXXXXXXXXXXXXXXXXXXXXXXX  PROBLEM ASSESSMENT RECOMMENDATIONS.  RESP.   . GAS EXCHANGE .   .... target PO 90-95%     . SOB  .... DD COPD CHF VTE PNEUM  .... 5/20/2025 vte unlikely as pt was on apixaba   .... 5/20/2025 sob is likely sec chf and copd     INFECTION.  . DATA  .... w 5/20/2025 w 9.2   .... cxr 5/20/2025 cxr infiltr r mid lower lung   .... ct ch 5/20/2025 cw 4/13/2025   ........ diffuse interlobular septal thickening ans small bl pl effs favored to be likely due to underlying edema or less likely infection   ....... aneurysmal dilation mid ascending thoracic aorta 4.5 cm   ........ 5.2 x 6.3 x 8.7 cm subxiphoid hernia containing inflamed fat and a portion of non obstructed mid transverse colon   .... rvp 5/20/2025 rvp (-)     . RO PNEUMONIA   .... 5/20/2025 low susp for infection    CARDIAC.  . HOME MEDS .  .... METOPROLOL 25.2  .... PLAVIX 75  .... ATORVASTAT 20  .... APIXA 2.5 X 2     . CAD    .... Trop 5/20-5/21/2025 tr 57 - 26   .... plavx 75 5/21/2025   .... monitor     . LACTICEMIA   .... la 5/20- 5/20-5/21/2025 la 2.1 - 3.6- 1.1   .... 5/20/2025 lacticemia is likelu sec to chf related flow problem     . CHF  .... pbnp 5/20-5/21/2025 pbnp [id number]   .... lasix 5/20/2025 l 20.3   .... cardio eval     HEMAT.  . DATA  .... Hb 5/20/2025 Hb 12.6   .... Plt 5/20/2025 plt 353   .... inr 5/20/2025inr 121   .... monitor     GI.   . DIET .   .... 5/20/2025 dash     . LFT MONITORING   .... LFTS    5/20/2025   ........ AP   72     ........ AST  18  ........ ALT   13     RENAL.  . DATA  .... K 5/20/2025 k 4   .... CO2 5/20/2025 co2 27   .... Cr 5/20/2025 Cr 1.3   .... ag 5/20/2025 ag 10   .... alb 5/20/2025 alb 4     . HYPONATREMIA   .... Na 5/20-5/21/2025 Na 128- 128   .... uosm 5/21/2025 228  .... jose 5/21/2025 62 (but pt is on lasix)   .... nacl 1g bid x 3d started by Dr Marcos  5/21/2025   .... checlk sosm uosm jose     . SUBXIPHOID ANT ABDOMINAL WALL HERNUA   .... 5/21/2025 seen by surgery is reducible no immediate intervention plannd       XXXXXXXXXXXXXXXXXXXXXX   SUMMARY BASELINE .     . 79M with PMh of CAD s/p stents, HTN, HLD, tobacco use h/o etoh use disorder from [facility]   . 5/21/2025 current smoker last drink 2 wk ago   CC.  . 5/20/2025 SHORTNESS OF BREATH    .  MAIN ISSUES.  . SMOKER   . SHORTNESS OF BREATH   . THORACIC AORTIC ANEURYSM   .... ct ch 5/20/2025 cw 4/13/2025   ....... aneurysmal dilation mid ascending thoracic aorta 4.5 cm   . LACTICEMIA   .... la 5/20- 5/20/2025 la 2.1 - 3.6  . CHF  .... pbnp 5/20/2025 pbnp [id number]  .... ct  5/20/2025 cw 4/13/2025   ........ diffuse interlobular septal thickening ans small bl pl effs favored to be likely due to underlying edema or less likely infectio  . HYPONATREMIA   .... Na 5/20/2025 Na 128  . ETOH USE   . SUBXIPHOID HERNIA ANTERIOR ABDOMINAL WALL   .... ct  5/20/2025 cw 4/13/2025   ........ 5.2 x 6.3 x 8.7 cm subxiphoid hernia containing inflamed fat and a portion of non obstructed mid transverse colon     PMH.   . active smoker  . COPD,   . HTN,   . HLD,   . CADho stents (per patient)   . ABD hernia,   . neuropathy    PROCEDURES/DEVICES .      DISCUSSIONS.  .... Discussed with primary care and relevant consultants on an ongoing basis       TIME SPENT.  . Over 36 minutes aggregate care time spent on encounter; activities included   direct patient care, counseling and/or coordinating care reviewing notes, lab data/ imaging , discussion with multidisciplinary team/ patient  /family and explaining in detail risks, benefits, alternatives  of the recommendations     INNA CHANG 79 m 5/20/2025 1945   
   REASON FOR VISIT  .. Management of problems listed below      EVENTS/CURRENT ISSUES.  . 5/21/2025 apixa 2.5 x 2 started by cardio for ho dvt (unclear when he had dvt)   . 5/21/2025 nicotine fa thia started         REVIEW OF SYMPTOMS   Able to give ROS  Yes     RELIABILITY +/-   CONSTITUTIONAL Weakness Yes    ENDOCRINE  No heat or cold intolerance    ALLERGY No hives  Sore throat No stridor  RESP Shortness of breath YES   NEURO New weakness No   CARDIAC   Palpitations No         PHYSICAL EXAM    HEENT Unremarkable  atraumatic   RESP Fair air entry  Harsh breath sound   CARDIAC S1 S2 No S3     NO JVD    ABDOMEN No hepatosplenomegaly   PEDAL EDEMA present No calf tenderness  NO rash     REASON FOR VISIT  .. Management of problems listed below         CC.   . 5/20/2025 c/o SOB. pt does wear O2 as needed. hx COPD, HTN, HLD, ABD hernia, neuropathy, CAD. pt is active smoker. AOX4 from [place]. HTN elevated. pt was given albuterol 1x , O2 via n/c 2L 97%.  shortness of breath, hypertension    OVERALL PRESENTATION.  . 5/20/2025 79M with PMh of CAD s/p stents, HTN, HLD, tobacco use h/o etoh use disorder who presents with shortness of breath and elevated BP. patient reports that his main concern for his BP being elevated and noticed he was wheezing. he admits to smoking but states he usually does not wheeze. denies fevers or chills, no chest pain, or vomiting. also mentions some increased leg swelling recently  PMH.        BEST PRACTICE ISSUES.  . HOB ELEVATN.    .... Yes  . DIET  .   .... 5/21/2025 dash  . FREE WATER.   ....   .  IV FLUID.  .....   . PHARMAC DVT PPLX .    .... APIXA 5/21/2025 A 2.5 x 2 started by Dr Brown   . NON PHARMAC DVT PPLX .      . STRESS ULCR PPLX .   ....   . DATE/DM MGMT.   ..... See under Endocrine section   GENERAL DATA .   . COVID.         .... scv2 5/20/2025 scv2 (-)   . GOC.    ....    . ICU STAY.    .... no   . INFECTION PPLX .   ....   . ALLGY.   ....  nka  . WT.   .... 5/20/2025 93  . BMI.  ....5/20/2025 31       XXXXXXXXXXXXXXXXXXXXXXX  VITALS/GAS EXCHANGE/DRIPS    ABGS.     .  VS/ PO/IO/ VENT/ DRIPS.  5/22/2025 afeb 49 120/70   5/22/2025 2l 93%     XXXXXXXXXXXXXXXXXXXXXXXXXXXXXXXXXXX  PROBLEM ASSESSMENT RECOMMENDATIONS.  RESP.   . GAS EXCHANGE .   .... target PO 90-95%     . SOB  .... DD COPD CHF VTE PNEUM  .... 5/20/2025 vte unlikely as pt was on apixaba   .... 5/20/2025 sob is likely sec chf and copd     INFECTION.  . DATA  .... w 5/20/2025 w 9.2   .... cxr 5/20/2025 cxr infiltr r mid lower lung   .... ct ch 5/20/2025 cw 4/13/2025   ........ diffuse interlobular septal thickening ans small bl pl effs favored to be likely due to underlying edema or less likely infection   ....... aneurysmal dilation mid ascending thoracic aorta 4.5 cm   ........ 5.2 x 6.3 x 8.7 cm subxiphoid hernia containing inflamed fat and a portion of non obstructed mid transverse colon   .... rvp 5/20/2025 rvp (-)     . RO PNEUMONIA   .... 5/20/2025 low susp for infection    CARDIAC.  . HOME MEDS .  .... METOPROLOL 25.2  .... PLAVIX 75  .... ATORVASTAT 20  .... APIXA 2.5 X 2     . CAD    .... Trop 5/20-5/21/2025 tr 57 - 26   .... plavx 75 5/21/2025   .... monitor     . LACTICEMIA   .... la 5/20- 5/20-5/21/2025 la 2.1 - 3.6- 1.1   .... 5/20/2025 lacticemia is likelu sec to chf related flow problem     . CHF  .... pbnp 5/20-5/21-5/22/2025   .... pbnp [id number] [phone]   .... lasix  ........  5/20-5/22/2025 l 20.3   ................ 5/22/2025 lasix 20   .... cardio eval     HEMAT.  . DATA  .... Hb 5/20/2025 Hb 12.6   .... Plt 5/20/2025 plt 353   .... inr 5/20/2025inr 121   .... monitor     GI.   . DIET .   .... 5/20/2025 dash     . LFT MONITORING   .... LFTS    5/20/2025   ........ AP   72     ........ AST  18  ........ ALT   13     RENAL.  . DATA  .... K 5/20/2025 k 4   .... CO2 5/20/2025 co2 27   .... ag 5/20/2025 ag 10   .... alb 5/20/2025 alb 4     . HYPONATREMIA   .... Na 5/20-5/21-5/22/2025 Na 128- 128 - 133   .... uosm 5/21/2025 228  .... jose 5/21/2025 62 (but pt is on lasix)   .... nacl 1g bid x 3d started by Dr Marcos  5/21/2025   .... checlk sosm uosm jose     . SYDNEY  .... Cr 5/20-5/22/2025 Cr 1.3 - 1.6     . SUBXIPHOID ANT ABDOMINAL WALL HERNUA   .... 5/21/2025 seen by surgery is reducible no immediate intervention plannd       XXXXXXXXXXXXXXXXXXXXXX   SUMMARY BASELINE .     . 79M with PMh of CAD s/p stents, HTN, HLD, tobacco use h/o etoh use disorder from [facility]   . 5/21/2025 current smoker last drink 2 wk ago   CC.  . 5/20/2025 SHORTNESS OF BREATH    .  MAIN ISSUES.  . SMOKER   . SHORTNESS OF BREATH   . THORACIC AORTIC ANEURYSM   .... ct ch 5/20/2025 cw 4/13/2025   ....... aneurysmal dilation mid ascending thoracic aorta 4.5 cm   . LACTICEMIA   .... la 5/20- 5/20/2025 la 2.1 - 3.6  . CHF  .... pbnp 5/20/2025 pbnp [id number]  .... ct  5/20/2025 cw 4/13/2025   ........ diffuse interlobular septal thickening ans small bl pl effs favored to be likely due to underlying edema or less likely infectio  . DYSPHAGIA EVAL   .... speech recommended regl thin   . HYPONATREMIA   .... Na 5/20/2025 Na 128  . ETOH USE   . SUBXIPHOID HERNIA ANTERIOR ABDOMINAL WALL   .... ct  5/20/2025  4/13/2025   ........ 5.2 x 6.3 x 8.7 cm subxiphoid hernia containing inflamed fat and a portion of non obstructed mid transverse colon     PMH.   . active smoker  . COPD,   . HTN,   . HLD,   . CADho stents (per patient)   . ABD hernia,   . neuropathy    PROCEDURES/DEVICES .      DISCUSSIONS.  .... Discussed with primary care and relevant consultants on an ongoing basis       TIME SPENT.  . Over 36 minutes aggregate care time spent on encounter; activities included   direct patient care, counseling and/or coordinating care reviewing notes, lab data/ imaging , discussion with multidisciplinary team/ patient  /family and explaining in detail risks, benefits, alternatives  of the recommendations     INNA CHANG 79 m 5/20/2025 1945   
   REASON FOR VISIT  .. Management of problems listed below      EVENTS/CURRENT ISSUES.  . 5/21/2025 apixa 2.5 x 2 started by cardio for ho dvt (unclear when he had dvt)   . 5/21/2025 nicotine fa thia started         REVIEW OF SYMPTOMS   Able to give ROS  Yes     RELIABILITY +/-   CONSTITUTIONAL Weakness Yes    ENDOCRINE  No heat or cold intolerance    ALLERGY No hives  Sore throat No stridor  RESP Shortness of breath YES   NEURO New weakness No   CARDIAC   Palpitations No         PHYSICAL EXAM    HEENT Unremarkable  atraumatic   RESP Fair air entry  Harsh breath sound   CARDIAC S1 S2 No S3     NO JVD    ABDOMEN No hepatosplenomegaly   PEDAL EDEMA present No calf tenderness  NO rash     REASON FOR VISIT  .. Management of problems listed below         CC.   . 5/20/2025 c/o SOB. pt does wear O2 as needed. hx COPD, HTN, HLD, ABD hernia, neuropathy, CAD. pt is active smoker. AOX4 from [place]. HTN elevated. pt was given albuterol 1x , O2 via n/c 2L 97%.  shortness of breath, hypertension    OVERALL PRESENTATION.  . 5/20/2025 79M with PMh of CAD s/p stents, HTN, HLD, tobacco use h/o etoh use disorder who presents with shortness of breath and elevated BP. patient reports that his main concern for his BP being elevated and noticed he was wheezing. he admits to smoking but states he usually does not wheeze. denies fevers or chills, no chest pain, or vomiting. also mentions some increased leg swelling recently  PMH.      BEST PRACTICE ISSUES.  . HOB ELEVATN.    .... Yes  . DIET  .   .... 5/21/2025 dash  . FREE WATER.   ....   .  IV FLUID.  .....   . PHARMAC DVT PPLX .    .... APIXA 5/21/2025 A 2.5 x 2 started by Dr Wahl   . NON PHARMAC DVT PPLX .      . STRESS ULCR PPLX .   ....   . DATE/DM MGMT.   ..... See under Endocrine section   GENERAL DATA .   . COVID.         .... scv2 5/20/2025 scv2 (-)   . GOC.    ....    . ICU STAY.    .... no   . INFECTION PPLX .   ....   . ALLGY.   ....  nka  . WT.   .... 5/20/2025 93  . BMI.  ....5/20/2025 31       XXXXXXXXXXXXXXXXXXXXXXX  VITALS/GAS EXCHANGE/DRIPS    ABGS.     .  VS/ PO/IO/ VENT/ DRIPS.  5/23/2025 afeb 61 119/70   5/23/2025 2l 97%    XXXXXXXXXXXXXXXXXXXXXXXXXXXXXXXXXXX  PROBLEM ASSESSMENT RECOMMENDATIONS.  RESP.   . GAS EXCHANGE .   .... target PO 90-95%     . SOB  .... DD COPD CHF VTE PNEUM  .... 5/20/2025 vte unlikely as pt was on apixaba   .... 5/20/2025 sob is likely sec chf and copd     INFECTION.  . DATA  .... w 5/20/2025 w 9.2   .... cxr 5/20/2025 cxr infiltr r mid lower lung   .... ct ch 5/20/2025 cw 4/13/2025   ........ diffuse interlobular septal thickening ans small bl pl effs favored to be likely due to underlying edema or less likely infection   ....... aneurysmal dilation mid ascending thoracic aorta 4.5 cm   ........ 5.2 x 6.3 x 8.7 cm subxiphoid hernia containing inflamed fat and a portion of non obstructed mid transverse colon   .... rvp 5/20/2025 rvp (-)     . RO PNEUMONIA   .... 5/20/2025 low susp for infection    CARDIAC.  . HOME MEDS .  .... METOPROLOL 25.2  .... PLAVIX 75  .... ATORVASTAT 20  .... APIXA 2.5 X 2     . CAD    .... Trop 5/20-5/21/2025 tr 57 - 26   .... ASA 81 5/23/2025 dr wahl   .... plavx 75 5/21/2025   .... monitor     . LACTICEMIA   .... la 5/20- 5/20-5/21/2025 la 2.1 - 3.6- 1.1   .... 5/20/2025 lacticemia is likelu sec to chf related flow problem     . CHF  .... pbnp 5/20-5/21-5/22/2025   .... pbnp [id number] [id number] - [phone]   .... tte 5/22   ........ ef 46%  ........ la mod dilatd mod mr   .... lasix  .... 5/20-5/22/2025 l 20.3   .... 5/22/2025 lasix 20   ........ 5/23/2025 l 40   .... cardio eval     HEMAT.  . DATA  .... Hb 5/20/2025 Hb 12.6   .... Plt 5/20/2025 plt 353   .... inr 5/20/2025inr 121   .... monitor     GI.   . DIET .   .... 5/20/2025 dash     . LFT MONITORING   .... LFTS    5/20/2025   ........ AP   72     ........ AST  18  ........ ALT   13     RENAL.  . DATA  .... K 5/20/2025 k 4   .... CO2 5/20/2025 co2 27   .... ag 5/20/2025 ag 10   .... alb 5/20/2025 alb 4     . HYPONATREMIA   .... Na 5/20-5/21-5/22-5/23/2025   .... Na 128- 128 - 133 - 132   .... uosm 5/21/2025 228  .... jose 5/21/2025 62 (but pt is on lasix)   .... nacl 1g bid x 3d started by Dr Marcos  5/21/2025   .... checlk sosm uosm jose     . SYDNEY  .... Cr 5/20-5/22-5/23/2025 Cr 1.3 - 1.6 - 1.5     . SUBXIPHOID ANT ABDOMINAL WALL HERNUA   .... 5/21/2025 seen by surgery is reducible no immediate intervention plannd       XXXXXXXXXXXXXXXXXXXXXX   SUMMARY BASELINE .     . 79M with PMh of CAD s/p stents, HTN, HLD, tobacco use h/o etoh use disorder from [facility]   . 5/21/2025 current smoker last drink 2 wk ago   CC.  . 5/20/2025 SHORTNESS OF BREATH    .  MAIN ISSUES.  . SMOKER   . SHORTNESS OF BREATH   . THORACIC AORTIC ANEURYSM   .... ct  5/20/2025  4/13/2025   ....... aneurysmal dilation mid ascending thoracic aorta 4.5 cm   . LACTICEMIA   .... la 5/20- 5/20/2025 la 2.1 - 3.6  . CHF  .... pbnp 5/20/2025 pbnp [id number]  .... ct  5/20/2025 cw 4/13/2025   ........ diffuse interlobular septal thickening ans small bl pl effs favored to be likely due to underlying edema or less likely infectio  . DYSPHAGIA EVAL   .... speech recommended regl thin   . HYPONATREMIA   .... Na 5/20/2025 Na 128  . ETOH USE   . SUBXIPHOID HERNIA ANTERIOR ABDOMINAL WALL   .... ct  5/20/2025  4/13/2025   ........ 5.2 x 6.3 x 8.7 cm subxiphoid hernia containing inflamed fat and a portion of non obstructed mid transverse colon     PMH.   . active smoker  . COPD,   . HTN,   . HLD,   . CADho stents (per patient)   . ABD hernia,   . neuropathy    PROCEDURES/DEVICES .      DISCUSSIONS.  .... Discussed with primary care and relevant consultants on an ongoing basis       TIME SPENT.  . Over 36 minutes aggregate care time spent on encounter; activities included   direct patient care, counseling and/or coordinating care reviewing notes, lab data/ imaging , discussion with multidisciplinary team/ patient  /family and explaining in detail risks, benefits, alternatives  of the recommendations     INNA CHANG 79 m 5/20/2025 1945   
   REASON FOR VISIT  .. Management of problems listed below      EVENTS/CURRENT ISSUES.  . 5/21/2025 apixa 2.5 x 2 started by cardio for ho dvt (unclear when he had dvt)   . 5/21/2025 nicotine fa thia started         REVIEW OF SYMPTOMS   Able to give ROS  Yes     RELIABILITY +/-   CONSTITUTIONAL Weakness Yes    ENDOCRINE  No heat or cold intolerance    ALLERGY No hives  Sore throat No stridor  RESP Shortness of breath YES   NEURO New weakness No   CARDIAC   Palpitations No         PHYSICAL EXAM    HEENT Unremarkable  atraumatic   RESP Fair air entry  Harsh breath sound   CARDIAC S1 S2 No S3     NO JVD    ABDOMEN No hepatosplenomegaly   PEDAL EDEMA present No calf tenderness  NO rash     REASON FOR VISIT  .. Management of problems listed below         CC.   . 5/20/2025 c/o SOB. pt does wear O2 as needed. hx COPD, HTN, HLD, ABD hernia, neuropathy, CAD. pt is active smoker. AOX4 from [place]. HTN elevated. pt was given albuterol 1x , O2 via n/c 2L 97%.  shortness of breath, hypertension    OVERALL PRESENTATION.  . 5/20/2025 79M with PMh of CAD s/p stents, HTN, HLD, tobacco use h/o etoh use disorder who presents with shortness of breath and elevated BP. patient reports that his main concern for his BP being elevated and noticed he was wheezing. he admits to smoking but states he usually does not wheeze. denies fevers or chills, no chest pain, or vomiting. also mentions some increased leg swelling recently  PMH.      BEST PRACTICE ISSUES.  . HOB ELEVATN.    .... Yes  . DIET  .   .... 5/21/2025 dash  . FREE WATER.   ....   .  IV FLUID.  .....   . PHARMAC DVT PPLX .    .... APIXA 5/21/2025 A 2.5 x 2 started by Dr Wahl   . NON PHARMAC DVT PPLX .      . STRESS ULCR PPLX .   ....   . DATE/DM MGMT.   ..... See under Endocrine section   GENERAL DATA .   . COVID.         .... scv2 5/20/2025 scv2 (-)   . GOC.    ....    . ICU STAY.    .... no   . INFECTION PPLX .   ....   . ALLGY.   ....  nka  . WT.   .... 5/20/2025 93  . BMI.  ....5/20/2025 31       [id number]  PROBLEM ASSESSMENT RECOMMENDATIONS.  RESP.   . GAS EXCHANGE .   .... target PO 90-95%     . SOB  .... DD COPD CHF VTE PNEUM  .... 5/20/2025 vte unlikely as pt was on apixaba   .... 5/20/2025 sob is likely sec chf and copd     INFECTION.  . DATA  .... w 5/20/2025 w 9.2   .... cxr 5/20/2025 cxr infiltr r mid lower lung   .... ct ch 5/20/2025 cw 4/13/2025   ........ diffuse interlobular septal thickening ans small bl pl effs favored to be likely due to underlying edema or less likely infection   ....... aneurysmal dilation mid ascending thoracic aorta 4.5 cm   ........ 5.2 x 6.3 x 8.7 cm subxiphoid hernia containing inflamed fat and a portion of non obstructed mid transverse colon   .... rvp 5/20/2025 rvp (-)     . RO PNEUMONIA   .... 5/20/2025 low susp for infection    CARDIAC.  . HOME MEDS .  .... METOPROLOL 25.2  .... PLAVIX 75  .... ATORVASTAT 20  .... APIXA 2.5 X 2     . CAD    .... Trop 5/20-5/21/2025 tr 57 - 26   .... ASA 81 5/23/2025 dr wahl   .... plavx 75 5/21/2025   .... monitor     . LACTICEMIA   .... la 5/20- 5/20-5/21/2025 la 2.1 - 3.6- 1.1   .... 5/20/2025 lacticemia is likelu sec to chf related flow problem     . CHF  .... pbnp 5/20-5/21-5/22/2025   .... pbnp [id number] - [id number]   .... tte 5/22   ........ ef 46%  ........ la mod dilatd mod mr   .... lasix  .... 5/20-5/22/2025 l 20.3   .... 5/22/2025 lasix 20   ........ 5/23/2025 l 40   .... cardio eval     HEMAT.  . DATA  .... Hb 5/20/2025 Hb 12.6   .... Plt 5/20/2025 plt 353   .... inr 5/20/2025inr 121   .... monitor     GI.   . DIET .   .... 5/20/2025 dash     . LFT MONITORING   .... LFTS    5/20/2025   ........ AP   72     ........ AST  18  ........ ALT   13     RENAL.  . DATA  .... K 5/20/2025 k 4   .... CO2 5/20/2025 co2 27   .... ag 5/20/2025 ag 10   .... alb 5/20/2025 alb 4     . HYPONATREMIA   .... Na 5/20-5/21-5/22-5/23/2025   .... Na 128- 128 - 133 - 132   .... uosm 5/21/2025 228  .... jose 5/21/2025 62 (but pt is on lasix)   .... nacl 1g bid x 3d started by Dr Marcos  5/21/2025   .... checlk sosm uosm jose     . SYDNEY  .... Cr 5/20-5/22-5/23/2025 Cr 1.3 - 1.6 - 1.5     . SUBXIPHOID ANT ABDOMINAL WALL HERNUA   .... 5/21/2025 seen by surgery is reducible no immediate intervention plannd       [id number]   SUMMARY BASELINE .     . 79M with PMh of CAD s/p stents, HTN, HLD, tobacco use h/o etoh use disorder from [facility]   . 5/21/2025 current smoker last drink 2 wk ago   CC.  . 5/20/2025 SHORTNESS OF BREATH    .  MAIN ISSUES.  . SMOKER   . SHORTNESS OF BREATH   . THORACIC AORTIC ANEURYSM   .... ct ch 5/20/2025 cw 4/13/2025   ....... aneurysmal dilation mid ascending thoracic aorta 4.5 cm   . LACTICEMIA   .... la 5/20- 5/20/2025 la 2.1 - 3.6  . CHF  .... pbnp 5/20/2025 pbnp [id number]  .... ct  5/20/2025 cw 4/13/2025   ........ diffuse interlobular septal thickening ans small bl pl effs favored to be likely due to underlying edema or less likely infectio  . DYSPHAGIA EVAL   .... speech recommended regl thin   . HYPONATREMIA   .... Na 5/20/2025 Na 128  . ETOH USE   . SUBXIPHOID HERNIA ANTERIOR ABDOMINAL WALL   .... ct  5/20/2025  4/13/2025   ........ 5.2 x 6.3 x 8.7 cm subxiphoid hernia containing inflamed fat and a portion of non obstructed mid transverse colon     PMH.   . active smoker  . COPD,   . HTN,   . HLD,   . CADho stents (per patient)   . ABD hernia,   . neuropathy    PROCEDURES/DEVICES .      DISCUSSIONS.  .... Discussed with primary care and relevant consultants on an ongoing basis       TIME SPENT.  . Over 36 minutes aggregate care time spent on encounter; activities included   direct patient care, counseling and/or coordinating care reviewing notes, lab data/ imaging , discussion with multidisciplinary team/ patient  /family and explaining in detail risks, benefits, alternatives  of the recommendations     INNA CHANG 79 m 5/20/2025 1947   
79M with PMh of CAD s/p stents, s/p CABG, HTN, HLD, tobacco use h/o etoh use disorder, COPD, known history of subxiphoid hernia presented to the ED with SOB admitted for COPD exacerbation. CT demonstrated a 5.2 x 6.3 x 8.7 cm subxiphoid hernia containing inflamed   fat and a portion of nonobstructed mid transverse colon. Hernia is easily reducible at bedside but also herniates back when patient coughs or sits up straight. Abdomen is soft nontender. patient with normal bowel function       PLAN:  - Reducible hernia, no acute surgical intervention   - No signs of ischemic changes   - Normal BMs/flatus  - If repair is warranted would recommend patient for a higher level facility due to his comorbidities   - Rest of care per primary team  - Case discussed with Dr. Light
79M with PMh of CAD s/p stents, HTN, HLD, tobacco use h/o etoh use disorder who presents with shortness of breath and elevated BP. patient reports that his main concern for his BP being elevated and noticed he was wheezing. he admits to smoking but states he usually does not wheeze. denies fevers or chills, no chest pain, or vomiting. also mentions some increased leg swelling recently Admit to monitored unit for cardiac monitoring, obtain echo to evaluate LVEF, intravenous diuresis as per card consult , monitor ins/outs, monitor renal profile and electrolytes closely ,send 3 sets of enzymes, O2 supply, serial chest xrays, monitor weights and oral intake of fluids, nutritionist consult Found to have pneumonia Admitted for septic workup and evaluation ,send blood and urine cx,serial lactate levels ,monitor vitals ,ivfs hydration ,monitor urine output and renal profile ,iv abx initiated  (20 May 2025 20:15)    hyponatremia  will check ua , urine osmolality , urine sodium , urine uric acid , serum sodium , serum osmolality , serum uric acid , f/u with hyponatremia work up , f/u with bmp , monitor i and o      fluid overload   furosemide   Injectable 20 milliGRAM(s) IV Push every 8 hours      dvt apixaban 2.5 milliGRAM(s) Oral every 12 hours      BP monitoring,continue current antihypertensive meds, low salt diet,followup with PMD in 1-2 weeks  metoprolol tartrate 25 milliGRAM(s) Oral every 12 hours  
79M with PMh of CAD s/p stents, HTN, HLD, tobacco use h/o etoh use disorder who presents with shortness of breath and elevated BP. patient reports that his main concern for his BP being elevated and noticed he was wheezing. he admits to smoking but states he usually does not wheeze. denies fevers or chills, no chest pain, or vomiting. also mentions some increased leg swelling recently Admit to monitored unit for cardiac monitoring, obtain echo to evaluate LVEF, intravenous diuresis as per card consult , monitor ins/outs, monitor renal profile and electrolytes closely ,send 3 sets of enzymes, O2 supply, serial chest xrays, monitor weights and oral intake of fluids, nutritionist consult Found to have pneumonia Admitted for septic workup and evaluation ,send blood and urine cx,serial lactate levels ,monitor vitals ,ivfs hydration ,monitor urine output and renal profile ,iv abx initiated 

## 2025-05-24 NOTE — PROGRESS NOTE ADULT - REASON FOR ADMISSION
sob and leg selling

## 2025-05-24 NOTE — PROGRESS NOTE ADULT - NUTRITIONAL ASSESSMENT
MEDICATIONS  (STANDING):  apixaban 2.5 milliGRAM(s) Oral every 12 hours  atorvastatin 20 milliGRAM(s) Oral at bedtime  clopidogrel Tablet 75 milliGRAM(s) Oral daily  DULoxetine 30 milliGRAM(s) Oral daily  finasteride 5 milliGRAM(s) Oral daily  folic acid 1 milliGRAM(s) Oral daily  furosemide   Injectable 20 milliGRAM(s) IV Push daily  gabapentin 100 milliGRAM(s) Oral at bedtime  lactobacillus acidophilus 1 Tablet(s) Oral every 12 hours  levothyroxine 25 MICROGram(s) Oral daily  lidocaine   4% Patch 1 Patch Transdermal daily  melatonin 3 milliGRAM(s) Oral at bedtime  metoprolol tartrate 25 milliGRAM(s) Oral every 12 hours  nicotine -   7 mG/24Hr(s) Patch 1 Patch Transdermal daily  pantoprazole    Tablet 40 milliGRAM(s) Oral before breakfast  polyethylene glycol 3350 17 Gram(s) Oral daily  potassium chloride    Tablet ER 20 milliEquivalent(s) Oral daily  senna 2 Tablet(s) Oral at bedtime  sodium chloride 1 Gram(s) Oral every 12 hours  sucralfate 1 Gram(s) Oral two times a day  tamsulosin 0.4 milliGRAM(s) Oral at bedtime  thiamine 100 milliGRAM(s) Oral daily
MEDICATIONS  (STANDING):  apixaban 5 milliGRAM(s) Oral every 12 hours  aspirin enteric coated 81 milliGRAM(s) Oral daily  atorvastatin 20 milliGRAM(s) Oral at bedtime  DULoxetine 30 milliGRAM(s) Oral daily  finasteride 5 milliGRAM(s) Oral daily  folic acid 1 milliGRAM(s) Oral daily  furosemide    Tablet 40 milliGRAM(s) Oral daily  gabapentin 100 milliGRAM(s) Oral at bedtime  lactobacillus acidophilus 1 Tablet(s) Oral every 12 hours  levothyroxine 25 MICROGram(s) Oral daily  lidocaine   4% Patch 1 Patch Transdermal daily  melatonin 3 milliGRAM(s) Oral at bedtime  metoprolol tartrate 25 milliGRAM(s) Oral every 12 hours  nicotine -   7 mG/24Hr(s) Patch 1 Patch Transdermal daily  pantoprazole    Tablet 40 milliGRAM(s) Oral before breakfast  polyethylene glycol 3350 17 Gram(s) Oral daily  potassium chloride    Tablet ER 20 milliEquivalent(s) Oral daily  senna 2 Tablet(s) Oral at bedtime  sucralfate 1 Gram(s) Oral two times a day  tamsulosin 0.4 milliGRAM(s) Oral at bedtime  thiamine 100 milliGRAM(s) Oral daily
MEDICATIONS  (STANDING):  apixaban 5 milliGRAM(s) Oral every 12 hours  atorvastatin 20 milliGRAM(s) Oral at bedtime  DULoxetine 30 milliGRAM(s) Oral daily  finasteride 5 milliGRAM(s) Oral daily  folic acid 1 milliGRAM(s) Oral daily  furosemide    Tablet 40 milliGRAM(s) Oral daily  gabapentin 100 milliGRAM(s) Oral at bedtime  lactobacillus acidophilus 1 Tablet(s) Oral every 12 hours  levothyroxine 25 MICROGram(s) Oral daily  lidocaine   4% Patch 1 Patch Transdermal daily  melatonin 3 milliGRAM(s) Oral at bedtime  metoprolol tartrate 25 milliGRAM(s) Oral every 12 hours  nicotine -   7 mG/24Hr(s) Patch 1 Patch Transdermal daily  pantoprazole    Tablet 40 milliGRAM(s) Oral before breakfast  polyethylene glycol 3350 17 Gram(s) Oral daily  potassium chloride    Tablet ER 20 milliEquivalent(s) Oral daily  senna 2 Tablet(s) Oral at bedtime  sodium chloride 1 Gram(s) Oral every 12 hours  sucralfate 1 Gram(s) Oral two times a day  tamsulosin 0.4 milliGRAM(s) Oral at bedtime  thiamine 100 milliGRAM(s) Oral daily

## 2025-05-24 NOTE — PROGRESS NOTE ADULT - PROVIDER SPECIALTY LIST ADULT
Nephrology
Pulmonology
Cardiology
Pulmonology
Surgery
Infectious Disease
Nephrology
Pulmonology
Pulmonology
Nephrology
Hospitalist
Nephrology
Hospitalist
Hospitalist

## 2025-05-26 LAB
CULTURE RESULTS: SIGNIFICANT CHANGE UP
CULTURE RESULTS: SIGNIFICANT CHANGE UP
SPECIMEN SOURCE: SIGNIFICANT CHANGE UP
SPECIMEN SOURCE: SIGNIFICANT CHANGE UP

## 2025-06-02 PROCEDURE — 83935 ASSAY OF URINE OSMOLALITY: CPT

## 2025-06-02 PROCEDURE — 36415 COLL VENOUS BLD VENIPUNCTURE: CPT

## 2025-06-02 PROCEDURE — 83735 ASSAY OF MAGNESIUM: CPT

## 2025-06-02 PROCEDURE — 96375 TX/PRO/DX INJ NEW DRUG ADDON: CPT

## 2025-06-02 PROCEDURE — 71250 CT THORAX DX C-: CPT

## 2025-06-02 PROCEDURE — 85027 COMPLETE CBC AUTOMATED: CPT

## 2025-06-02 PROCEDURE — 84100 ASSAY OF PHOSPHORUS: CPT

## 2025-06-02 PROCEDURE — 96374 THER/PROPH/DIAG INJ IV PUSH: CPT

## 2025-06-02 PROCEDURE — 84443 ASSAY THYROID STIM HORMONE: CPT

## 2025-06-02 PROCEDURE — 83930 ASSAY OF BLOOD OSMOLALITY: CPT

## 2025-06-02 PROCEDURE — 93306 TTE W/DOPPLER COMPLETE: CPT

## 2025-06-02 PROCEDURE — 0225U NFCT DS DNA&RNA 21 SARSCOV2: CPT

## 2025-06-02 PROCEDURE — 80307 DRUG TEST PRSMV CHEM ANLYZR: CPT

## 2025-06-02 PROCEDURE — 85025 COMPLETE CBC W/AUTO DIFF WBC: CPT

## 2025-06-02 PROCEDURE — 85610 PROTHROMBIN TIME: CPT

## 2025-06-02 PROCEDURE — 97162 PT EVAL MOD COMPLEX 30 MIN: CPT

## 2025-06-02 PROCEDURE — 85730 THROMBOPLASTIN TIME PARTIAL: CPT

## 2025-06-02 PROCEDURE — 93005 ELECTROCARDIOGRAM TRACING: CPT

## 2025-06-02 PROCEDURE — 87040 BLOOD CULTURE FOR BACTERIA: CPT

## 2025-06-02 PROCEDURE — 81003 URINALYSIS AUTO W/O SCOPE: CPT

## 2025-06-02 PROCEDURE — 84300 ASSAY OF URINE SODIUM: CPT

## 2025-06-02 PROCEDURE — 99285 EMERGENCY DEPT VISIT HI MDM: CPT | Mod: 25

## 2025-06-02 PROCEDURE — 80048 BASIC METABOLIC PNL TOTAL CA: CPT

## 2025-06-02 PROCEDURE — 84145 PROCALCITONIN (PCT): CPT

## 2025-06-02 PROCEDURE — 92610 EVALUATE SWALLOWING FUNCTION: CPT

## 2025-06-02 PROCEDURE — 94640 AIRWAY INHALATION TREATMENT: CPT

## 2025-06-02 PROCEDURE — 83605 ASSAY OF LACTIC ACID: CPT

## 2025-06-02 PROCEDURE — 71045 X-RAY EXAM CHEST 1 VIEW: CPT

## 2025-06-02 PROCEDURE — 83880 ASSAY OF NATRIURETIC PEPTIDE: CPT

## 2025-06-02 PROCEDURE — 84484 ASSAY OF TROPONIN QUANT: CPT

## 2025-06-02 PROCEDURE — 80053 COMPREHEN METABOLIC PANEL: CPT

## 2025-06-04 ENCOUNTER — TRANSCRIPTION ENCOUNTER (OUTPATIENT)
Age: 80
End: 2025-06-04

## 2025-06-18 ENCOUNTER — TRANSCRIPTION ENCOUNTER (OUTPATIENT)
Age: 80
End: 2025-06-18